# Patient Record
Sex: FEMALE | Race: WHITE | NOT HISPANIC OR LATINO | Employment: OTHER | URBAN - METROPOLITAN AREA
[De-identification: names, ages, dates, MRNs, and addresses within clinical notes are randomized per-mention and may not be internally consistent; named-entity substitution may affect disease eponyms.]

---

## 2017-01-03 ENCOUNTER — TELEPHONE (OUTPATIENT)
Dept: RHEUMATOLOGY | Facility: CLINIC | Age: 62
End: 2017-01-03

## 2017-01-03 ENCOUNTER — TELEPHONE (OUTPATIENT)
Dept: INTERNAL MEDICINE | Facility: CLINIC | Age: 62
End: 2017-01-03

## 2017-01-03 DIAGNOSIS — E03.4 HYPOTHYROIDISM DUE TO ACQUIRED ATROPHY OF THYROID: Primary | ICD-10-CM

## 2017-01-03 NOTE — TELEPHONE ENCOUNTER
----- Message from Rashid Kenny sent at 1/3/2017 12:27 PM CST -----  Contact: pt  She's calling in regards to her RX medication, please advise, [phone] (home)

## 2017-01-03 NOTE — TELEPHONE ENCOUNTER
Spoke with patient who states she has an abscess tooth and was prescribed ABX. I informed her that she should stop her Humira and Arava while on her ABX and while the infection is still present. Resume once ABX is finished and infection is resolved. Ms. Becerril states understanding.

## 2017-01-03 NOTE — TELEPHONE ENCOUNTER
----- Message from Colette Marvin sent at 1/3/2017  9:45 AM CST -----  States she is scheduled for lab work on 1/9. Would like lab orders to be faxed over to [facility]. Please adv/call [phone].//thanks. cw

## 2017-01-09 ENCOUNTER — TELEPHONE (OUTPATIENT)
Dept: INTERNAL MEDICINE | Facility: CLINIC | Age: 62
End: 2017-01-09

## 2017-01-09 NOTE — TELEPHONE ENCOUNTER
----- Message from Chantale Villafuerte sent at 1/9/2017  8:15 AM CST -----  Contact: pt [phone]  States her orders for her labs weren't faxed to [facility] to and states she expects an answer and why was her time was wasted to go and can be reached at [phone]//shon/adilene

## 2017-01-09 NOTE — TELEPHONE ENCOUNTER
S/w pt and informed that orders will be refaxed. Pt verbalized understanding. Orders faxed to [phone].

## 2017-02-10 DIAGNOSIS — E03.4 HYPOTHYROIDISM DUE TO ACQUIRED ATROPHY OF THYROID: Primary | ICD-10-CM

## 2017-02-10 RX ORDER — LEVOTHYROXINE SODIUM 75 UG/1
TABLET ORAL
Qty: 90 TABLET | Refills: 0 | Status: SHIPPED | OUTPATIENT
Start: 2017-02-10 | End: 2017-04-03 | Stop reason: SDUPTHER

## 2017-02-28 ENCOUNTER — OFFICE VISIT (OUTPATIENT)
Dept: RHEUMATOLOGY | Facility: CLINIC | Age: 62
End: 2017-02-28
Payer: COMMERCIAL

## 2017-02-28 VITALS
SYSTOLIC BLOOD PRESSURE: 137 MMHG | HEART RATE: 59 BPM | WEIGHT: 149.5 LBS | BODY MASS INDEX: 23.41 KG/M2 | DIASTOLIC BLOOD PRESSURE: 88 MMHG

## 2017-02-28 DIAGNOSIS — Z51.81 MEDICATION MONITORING ENCOUNTER: ICD-10-CM

## 2017-02-28 DIAGNOSIS — J98.4 RESTRICTIVE LUNG DISEASE: ICD-10-CM

## 2017-02-28 DIAGNOSIS — L40.9 PSORIASIS: Primary | ICD-10-CM

## 2017-02-28 DIAGNOSIS — R91.8 MULTIPLE LUNG NODULES: ICD-10-CM

## 2017-02-28 DIAGNOSIS — D84.9 IMMUNOCOMPROMISED: ICD-10-CM

## 2017-02-28 DIAGNOSIS — L40.50 PSORIATIC ARTHRITIS: ICD-10-CM

## 2017-02-28 PROCEDURE — 1160F RVW MEDS BY RX/DR IN RCRD: CPT | Mod: S$GLB,,, | Performed by: PHYSICIAN ASSISTANT

## 2017-02-28 PROCEDURE — 99999 PR PBB SHADOW E&M-EST. PATIENT-LVL III: CPT | Mod: PBBFAC,,, | Performed by: PHYSICIAN ASSISTANT

## 2017-02-28 PROCEDURE — 99214 OFFICE O/P EST MOD 30 MIN: CPT | Mod: S$GLB,,, | Performed by: PHYSICIAN ASSISTANT

## 2017-02-28 NOTE — PROGRESS NOTES
Subjective:       Patient ID: Maricarmen Becerril is a 61 y.o. female.    Chief Complaint: Psoriatic Arthritis    HPI Comments: Maricarmen is here for routine follow up for psoriatic arthritis with psoriasis.  She in the past has been on mtx but this was discontinued due to development of ILD and lung nodules.  She is currently on Arava 20mg every other day and humira 40mg every other week and doing well.  Her pain is 0/10.  She has no acute swollen joints or digits.  She says she may have a little psoriasis rash on the inside of her ear because it itches but no rash behind her ears or on her buttock which is where she used to have it. She has chronic neck and back pain due to djd of her  Neck and back, she says she has bulging discs in her low back. She sees pulmonary for her lungs and her last CT was stable. She also takes neurontin 100mg nightly for numbness and pain in her legs.  This is helping.     Review of Systems   Constitutional: Negative for chills, fatigue and fever.   HENT: Negative for mouth sores, rhinorrhea and sore throat.    Eyes: Negative for pain and redness.   Respiratory: Negative for cough and shortness of breath.    Cardiovascular: Negative for chest pain.   Gastrointestinal: Negative for abdominal pain, constipation, diarrhea, nausea and vomiting.   Genitourinary: Negative for dysuria and hematuria.   Musculoskeletal: Positive for arthralgias, back pain and neck pain. Negative for joint swelling and myalgias.   Skin: Positive for rash.   Neurological: Positive for numbness. Negative for weakness and headaches.   Psychiatric/Behavioral: The patient is not nervous/anxious.          Objective:   /88  Pulse (!) 59  Wt 67.8 kg (149 lb 7.6 oz)  BMI 23.41 kg/m2     Physical Exam   Constitutional: She is oriented to person, place, and time and well-developed, well-nourished, and in no distress.   HENT:   Head: Normocephalic and atraumatic.   Eyes: Pupils are equal, round, and reactive to  light. Right eye exhibits no discharge.   Neck: Normal range of motion.   Cardiovascular: Normal rate, regular rhythm and normal heart sounds.  Exam reveals no friction rub.    Pulmonary/Chest: Effort normal and breath sounds normal. No respiratory distress.   Abdominal: Soft. She exhibits no distension. There is no tenderness.   Lymphadenopathy:     She has no cervical adenopathy.   Neurological: She is alert and oriented to person, place, and time.   Skin: No rash noted. No erythema.     No psoriasis rash visible, she says the inside of her R ear itches and has a little rash   Psychiatric: Mood normal.   Musculoskeletal: Normal range of motion. She exhibits no edema or deformity.   liya hands with oa changes to her pip and dip joints, no synovitis, no dactylitis noted.      Full rom to liya wrists, mcps, pips    Full rom liya knees         [facility] yet    Ct chest 6-14-16: Previously demonstrated tiny pulmonary nodules are not identified. There are no new nodules. There are one or 2 tiny nodules at the lateral left lung base. No pulmonary infiltrates or pleural effusions. No abnormality of the airway. No evidence of pathologic hilar or mediastinal lymphadenopathy     Assessment:       1. Psoriasis    2. Psoriatic arthritis    3. Medication monitoring encounter    4. Immunocompromised    5. Restrictive lung disease    6. Multiple lung nodules        Psoriatic arthritis with psoriasis:  On humira 40mg every other week and arava 20mg every other day, celebrex 200mg/d; doing well, cdai 0, mhaq 0.2; failed mtx due to development of ILD/lung nodules  Medication monitoring: no signs of toxicity, need to review labwork  Immunocompromised:  utd except zoster, she is declining the vaccine at this time  Lung disease/lung nodules: thought due to mtx, pulm following, this is stable  DJD spine neck back: with radicular symptoms liya legs, stable, celebrex helps as does neurontin 100mg q  hs    Plan:   Send for labs, getting done at [facility]  Continue humira 40mg every other week and arava 20mg every other day  Cont f/u with pulm for ILD  Encouraged zoster vaccine, she will think about it, she knows she needs to hold her dmard/biologic when she gets this, could be candidate for research study, but again she is not wanting this     Return in 4 mos with labwork done prior to appt and in 8 mos with dr. Gu.  Reviewed,  Will se if can encourage her to get Shingles vacc.  DANN GU MD

## 2017-03-20 ENCOUNTER — TELEPHONE (OUTPATIENT)
Dept: RESEARCH | Facility: HOSPITAL | Age: 62
End: 2017-03-20

## 2017-03-20 NOTE — TELEPHONE ENCOUNTER
Contacted patient regarding participation in the VERVE trial. Patient is not interested in receiving the shingles vaccination at this time. Coordinator informed patient that the research department will follow-up with her at her next Rheumatology visit to gauge interest.

## 2017-03-21 ENCOUNTER — PATIENT OUTREACH (OUTPATIENT)
Dept: ADMINISTRATIVE | Facility: HOSPITAL | Age: 62
End: 2017-03-21

## 2017-03-29 LAB
ALBUMIN SERPL-MCNC: 4.5 G/DL (ref 3.6–4.8)
ALBUMIN/GLOB SERPL: 1.5 {RATIO} (ref 1.2–2.2)
ALP SERPL-CCNC: 63 IU/L (ref 39–117)
ALT SERPL-CCNC: 34 IU/L (ref 0–32)
AST SERPL-CCNC: 26 IU/L (ref 0–40)
BASOPHILS # BLD AUTO: 0 X10E3/UL (ref 0–0.2)
BASOPHILS NFR BLD AUTO: 1 %
BILIRUB SERPL-MCNC: 0.5 MG/DL (ref 0–1.2)
BUN SERPL-MCNC: 23 MG/DL (ref 8–27)
BUN/CREAT SERPL: 33 (ref 11–26)
CALCIUM SERPL-MCNC: 10 MG/DL (ref 8.7–10.3)
CHLORIDE SERPL-SCNC: 101 MMOL/L (ref 96–106)
CHOLEST SERPL-MCNC: 248 MG/DL (ref 100–199)
CO2 SERPL-SCNC: 23 MMOL/L (ref 18–29)
CREAT SERPL-MCNC: 0.7 MG/DL (ref 0.57–1)
CRP SERPL-MCNC: 1 MG/L (ref 0–4.9)
EOSINOPHIL # BLD AUTO: 0.4 X10E3/UL (ref 0–0.4)
EOSINOPHIL NFR BLD AUTO: 7 %
ERYTHROCYTE [DISTWIDTH] IN BLOOD BY AUTOMATED COUNT: 14.5 % (ref 12.3–15.4)
ERYTHROCYTE [SEDIMENTATION RATE] IN BLOOD BY WESTERGREN METHOD: 5 MM/HR (ref 0–40)
GLOBULIN SER CALC-MCNC: 3 G/DL (ref 1.5–4.5)
GLUCOSE SERPL-MCNC: 88 MG/DL (ref 65–99)
HCT VFR BLD AUTO: 41.6 % (ref 34–46.6)
HDLC SERPL-MCNC: 78 MG/DL
HGB BLD-MCNC: 13.5 G/DL (ref 11.1–15.9)
IMM GRANULOCYTES # BLD: 0 X10E3/UL (ref 0–0.1)
IMM GRANULOCYTES NFR BLD: 0 %
LDLC SERPL CALC-MCNC: 159 MG/DL (ref 0–99)
LYMPHOCYTES # BLD AUTO: 1.6 X10E3/UL (ref 0.7–3.1)
LYMPHOCYTES NFR BLD AUTO: 33 %
MCH RBC QN AUTO: 29.7 PG (ref 26.6–33)
MCHC RBC AUTO-ENTMCNC: 32.5 G/DL (ref 31.5–35.7)
MCV RBC AUTO: 92 FL (ref 79–97)
MONOCYTES # BLD AUTO: 0.5 X10E3/UL (ref 0.1–0.9)
MONOCYTES NFR BLD AUTO: 11 %
NEUTROPHILS # BLD AUTO: 2.4 X10E3/UL (ref 1.4–7)
NEUTROPHILS NFR BLD AUTO: 48 %
PLATELET # BLD AUTO: 274 X10E3/UL (ref 150–379)
POTASSIUM SERPL-SCNC: 4.5 MMOL/L (ref 3.5–5.2)
PROT SERPL-MCNC: 7.5 G/DL (ref 6–8.5)
RBC # BLD AUTO: 4.54 X10E6/UL (ref 3.77–5.28)
SODIUM SERPL-SCNC: 143 MMOL/L (ref 134–144)
TRIGL SERPL-MCNC: 57 MG/DL (ref 0–149)
TSH SERPL DL<=0.005 MIU/L-ACNC: 1.81 UIU/ML (ref 0.45–4.5)
VLDLC SERPL CALC-MCNC: 11 MG/DL (ref 5–40)
WBC # BLD AUTO: 5 X10E3/UL (ref 3.4–10.8)

## 2017-04-03 ENCOUNTER — OFFICE VISIT (OUTPATIENT)
Dept: INTERNAL MEDICINE | Facility: CLINIC | Age: 62
End: 2017-04-03
Payer: COMMERCIAL

## 2017-04-03 VITALS
OXYGEN SATURATION: 100 % | HEART RATE: 75 BPM | DIASTOLIC BLOOD PRESSURE: 80 MMHG | HEIGHT: 66 IN | SYSTOLIC BLOOD PRESSURE: 118 MMHG | WEIGHT: 147.69 LBS | TEMPERATURE: 96 F | BODY MASS INDEX: 23.74 KG/M2

## 2017-04-03 DIAGNOSIS — Z12.11 SCREEN FOR COLON CANCER: ICD-10-CM

## 2017-04-03 DIAGNOSIS — Z00.00 ROUTINE HEALTH MAINTENANCE: Primary | ICD-10-CM

## 2017-04-03 PROCEDURE — 99999 PR PBB SHADOW E&M-EST. PATIENT-LVL III: CPT | Mod: PBBFAC,,, | Performed by: FAMILY MEDICINE

## 2017-04-03 PROCEDURE — 99396 PREV VISIT EST AGE 40-64: CPT | Mod: S$GLB,,, | Performed by: FAMILY MEDICINE

## 2017-04-03 RX ORDER — LEVOTHYROXINE SODIUM 75 UG/1
75 TABLET ORAL EVERY MORNING
Qty: 90 TABLET | Refills: 3 | Status: SHIPPED | OUTPATIENT
Start: 2017-04-03 | End: 2018-04-16 | Stop reason: SDUPTHER

## 2017-04-03 RX ORDER — SODIUM, POTASSIUM,MAG SULFATES 17.5-3.13G
1 SOLUTION, RECONSTITUTED, ORAL ORAL DAILY PRN
Qty: 1 BOTTLE | Refills: 0 | Status: SHIPPED | OUTPATIENT
Start: 2017-04-03 | End: 2021-03-02

## 2017-04-03 NOTE — PROGRESS NOTES
Subjective:       Patient ID: Maricarmen Becerril is a . female.    Chief Complaint: here for physical examination and issues  below    HPI Hypothyroidism: nl  [facility] tsh. Tolerating med. asympt  intersit lungdx/nodules pulm  due 7/17  psor arth utd rheum    Past Medical History   Diagnosis Date    Psoriatic arthritis     Positive SANA (antinuclear antibody)     Allergic rhinitis     Hypothyroidism      SECONDARY    Lung disease      mtx related    Allergy     Arthritis     Acid reflux     Heart murmur     Dry mouth      Past Surgical History   Procedure Laterality Date    Thyroidectomy      Tonsillectomy      Mandible fracture surgery      Other surgical history       uterine suspension    Dilation and curettage of uterus       Family History   Problem Relation Age of Onset    Parkinsonism Mother     Cataracts Mother     Gout Father     Hypothyroidism Father     Cataracts Father     Hypertension Father      History     Social History    Marital Status:      Spouse Name: BOLIVAR     Number of Children: N/A    Years of Education: N/A     Occupational History          Social History Main Topics    Smoking status: Never Smoker     Smokeless tobacco: Never Used    Alcohol Use: No    Drug Use: No    Sexual Activity: Not on file     Other Topics Concern    Not on file     Social History Narrative       Review of Systems  Cardiovascular: no chest pain  Chest: no shortness of breath  Abd: no abd pain  Remainder review of systems negative    Objective:      Physical Exam   Constitutional: She is oriented to person, place, and time. She appears well-developed and well-nourished. No distress.   HENT:   Head: Atraumatic.   Right Ear: External ear normal.   Left Ear: External ear normal.   Nose: Nose normal.   Mouth/Throat: Oropharynx is clear and moist. No oropharyngeal exudate.   bilat tms nl   Eyes: Conjunctivae and EOM are normal. Pupils are equal, round, and reactive to light. No  scleral icterus.   Neck: Normal range of motion. Neck supple. No thyromegaly present.   Cardiovascular: Normal rate, regular rhythm and normal heart sounds.    No murmur heard.  Pulmonary/Chest: Effort normal and breath sounds normal. No respiratory distress. She has no wheezes. She has no rales.   Abdominal: Soft. Bowel sounds are normal. She exhibits no distension and no mass. There is no hepatosplenomegaly. There is no tenderness. There is no rebound and no guarding.   Musculoskeletal: Normal range of motion. She exhibits no edema or tenderness.   Lymphadenopathy:     She has no cervical adenopathy.   Neurological: She is alert and oriented to person, place, and time. No cranial nerve deficit. She exhibits normal muscle tone. Coordination normal.   Skin: Skin is warm. No rash noted. No erythema. No pallor.   Psychiatric: She has a normal mood and affect. Her behavior is normal. Judgment and thought content normal.   Nursing note and vitals reviewed.      Assessment:       1. Routine health maintenance    2. Hypothyroidism, unspecified hypothyroidism type        Lung nodules  Psori. arthritis  Plan:     F/u one yr: copy lab to her  *    F/u pulm as planned ct etc this summer  F/u rheum when due  .**  Add:note prev d/wd 2011 mri show liver lesions most likely hemangiomas w recomm fede; d/w this and reason for but she declines  Routine health maintenance    Screen for colon cancer  -     Case request GI: COLONOSCOPY  -     sodium,potassium,mag sulfates (SUPREP BOWEL PREP KIT) 17.5-3.13-1.6 gram SolR; Take 1 kit by mouth daily as needed.  Dispense: 1 Bottle; Refill: 0    Other orders  -     levothyroxine (SYNTHROID) 75 MCG tablet; Take 1 tablet (75 mcg total) by mouth every morning.  Dispense: 90 tablet; Refill: 3

## 2017-04-03 NOTE — MR AVS SNAPSHOT
[facility] Internal Medicine  [address] [address]  Phone: [phone]  Fax: [phone]                  Maricarmen Becerril   4/3/2017 8:20 AM   Office Visit    Description:  Female : 1955   Provider:  Ryan Viramontes MD   Department:  [facility] - Internal Medicine           Diagnoses this Visit        Comments    Routine health maintenance    -  Primary     Screen for colon cancer                To Do List           Future Appointments        Provider Department Dept Phone    2017 8:00 AM Lisa Knapp PA-C [facility] Rheumatology [phone]    2017 10:40 AM PULMONARY LAB, [facility] - Pulm Function S[facility]cs [phone]    2017 11:20 AM Edgar Sinclair MD [facility] - Pulmonary Services [phone]    10/25/2017 8:30 AM Edgar Gu MD [facility] Rheumatology [phone]    2018 8:20 AM Ryan Viramontes MD [facility] Internal Medicine [phone]      Goals (5 Years of Data)     None      Follow-Up and Disposition     Return in about 1 year (around 4/3/2018).       These Medications        Disp Refills Start End    levothyroxine (SYNTHROID) 75 MCG tablet 90 tablet 3 4/3/2017     Take 1 tablet (75 mcg total) by mouth every morning. - Oral    Pharmacy: [facility] [address] [place] [address] -  [address] AT [facility] Ph #: [phone]       sodium,potassium,mag sulfates (SUPREP BOWEL PREP KIT) 17.5-3.13-1.6 gram SolR 1 Bottle 0 4/3/2017     Take 1 kit by mouth daily as needed. - Oral    Pharmacy: [facility] [address] [place] [address] Ph #: [phone]         [facility]     [facility] Nurse Care Line - 24/7 Assistance  Unless otherwise directed by your provider, please contact [facility], our nurse care line that is available for 24/7 assistance.     Registered nurses in the [facility] provide: appointment scheduling, clinical advisement, health education, and other  advisory services.  Call: [phone] (toll free)               Medications           Message regarding Medications     Verify the changes and/or additions to your medication regime listed below are the same as discussed with your clinician today.  If any of these changes or additions are incorrect, please notify your healthcare provider.        START taking these NEW medications        Refills    sodium,potassium,mag sulfates (SUPREP BOWEL PREP KIT) 17.5-3.13-1.6 gram SolR 0    Sig: Take 1 kit by mouth daily as needed.    Class: Normal    Route: Oral      CHANGE how you are taking these medications     Start Taking Instead of    levothyroxine (SYNTHROID) 75 MCG tablet levothyroxine (SYNTHROID) 75 MCG tablet    Dosage:  Take 1 tablet (75 mcg total) by mouth every morning. Dosage:  TAKE 1 TABLET BY MOUTH EVERY MORNING    Reason for Change:  Reorder       STOP taking these medications     fluticasone (FLONASE) 50 mcg/actuation nasal spray 2 sprays by Each Nare route once daily.           Verify that the below list of medications is an accurate representation of the medications you are currently taking.  If none reported, the list may be blank. If incorrect, please contact your healthcare provider. Carry this list with you in case of emergency.           Current Medications     adalimumab (HUMIRA PEN) PnKt injection INJECT 40MG SUBCUTANEOUSLY EVERY OTHER WEEK    celecoxib (CELEBREX) 200 MG capsule Take 1 capsule (200 mg total) by mouth once daily.    fish oil-omega-3 fatty acids 300-1,000 mg capsule Take 1 g by mouth once daily.    gabapentin (NEURONTIN) 100 MG capsule Take 1 capsule (100 mg total) by mouth every evening. For nerve pain /numbness in legs    leflunomide (ARAVA) 20 MG Tab Take 1 tablet (20 mg total) by mouth every other day.    levothyroxine (SYNTHROID) 75 MCG tablet Take 1 tablet (75 mcg total) by mouth every morning.    tizanidine (ZANAFLEX) 4 MG tablet Take 1 tablet (4 mg total) by mouth every  "evening. For neck spasm    sodium,potassium,mag sulfates (SUPREP BOWEL PREP KIT) 17.5-3.13-1.6 gram SolR Take 1 kit by mouth daily as needed.           Clinical Reference Information           Your Vitals Were     BP Pulse Temp Height    118/80 (BP Location: Right arm, Patient Position: Sitting, BP Method: Manual) 75 96.2 °F (35.7 °C) (Tympanic) 5' 6" (1.676 m)    Weight SpO2 BMI    67 kg (147 lb 11.3 oz) 100% 23.84 kg/m2      Blood Pressure          Most Recent Value    BP  118/80      Allergies as of 4/3/2017     Codeine    Methotrexate    Tramadol      Immunizations Administered on Date of Encounter - 4/3/2017     None      Orders Placed During Today's Visit      Normal Orders This Visit    Case request GI: COLONOSCOPY       Language Assistance Services     ATTENTION: Language assistance services are available, free of charge. Please call [phone].      ATENCIÓN: Si habla español, tiene a zendejas disposición servicios gratuitos de asistencia lingüística. Llame al [phone].     VITOR Ý: N?u b?n nói Ti?ng Vi?t, có các d?ch v? h? tr? ngôn ng? mi?n phí dành cho b?n. G?i s? [phone].         [facility] - Internal Medicine complies with applicable Federal civil rights laws and does not discriminate on the basis of race, color, national origin, age, disability, or sex.        "

## 2017-04-23 DIAGNOSIS — M47.812 SPONDYLOSIS OF CERVICAL REGION WITHOUT MYELOPATHY OR RADICULOPATHY: ICD-10-CM

## 2017-04-24 RX ORDER — TIZANIDINE 4 MG/1
TABLET ORAL
Qty: 30 TABLET | Refills: 2 | Status: SHIPPED | OUTPATIENT
Start: 2017-04-24 | End: 2017-07-30 | Stop reason: SDUPTHER

## 2017-04-30 DIAGNOSIS — L40.50 PSORIATIC ARTHRITIS: ICD-10-CM

## 2017-05-01 RX ORDER — CELECOXIB 200 MG/1
CAPSULE ORAL
Qty: 90 CAPSULE | Refills: 0 | Status: SHIPPED | OUTPATIENT
Start: 2017-05-01 | End: 2017-08-14 | Stop reason: SDUPTHER

## 2017-05-17 DIAGNOSIS — L40.50 PSORIATIC ARTHRITIS: ICD-10-CM

## 2017-05-18 RX ORDER — LEFLUNOMIDE 20 MG/1
TABLET ORAL
Qty: 15 TABLET | Refills: 3 | Status: SHIPPED | OUTPATIENT
Start: 2017-05-18 | End: 2017-09-26 | Stop reason: SDUPTHER

## 2017-06-27 ENCOUNTER — OFFICE VISIT (OUTPATIENT)
Dept: RHEUMATOLOGY | Facility: CLINIC | Age: 62
End: 2017-06-27
Payer: COMMERCIAL

## 2017-06-27 VITALS
SYSTOLIC BLOOD PRESSURE: 130 MMHG | BODY MASS INDEX: 24.09 KG/M2 | WEIGHT: 149.25 LBS | HEART RATE: 63 BPM | DIASTOLIC BLOOD PRESSURE: 89 MMHG

## 2017-06-27 DIAGNOSIS — J98.4 RESTRICTIVE LUNG DISEASE: ICD-10-CM

## 2017-06-27 DIAGNOSIS — D84.9 IMMUNOCOMPROMISED: ICD-10-CM

## 2017-06-27 DIAGNOSIS — L40.50 PSORIATIC ARTHRITIS: Primary | ICD-10-CM

## 2017-06-27 DIAGNOSIS — L40.9 PSORIASIS: ICD-10-CM

## 2017-06-27 DIAGNOSIS — Z51.81 MEDICATION MONITORING ENCOUNTER: ICD-10-CM

## 2017-06-27 PROCEDURE — 99999 PR PBB SHADOW E&M-EST. PATIENT-LVL III: CPT | Mod: PBBFAC,,, | Performed by: PHYSICIAN ASSISTANT

## 2017-06-27 PROCEDURE — 99214 OFFICE O/P EST MOD 30 MIN: CPT | Mod: S$GLB,,, | Performed by: PHYSICIAN ASSISTANT

## 2017-06-27 ASSESSMENT — ROUTINE ASSESSMENT OF PATIENT INDEX DATA (RAPID3): MDHAQ FUNCTION SCORE: .2

## 2017-06-27 NOTE — PATIENT INSTRUCTIONS
Increase gabapentin to 200mg at night then 300mg at night, can add doses during the day if it doesn't make you tired  Vit B6 for Neuropathy   Tumeric 1000mg/day for antiinflammation    No change meds for psoriatic arthritis/psoriasis     Avoid nsaids aleve, ibuprofen, motrin  Ok for tylenol, otc sinus medications, allergy medications      labwork this week, and then before you see Dr. Gu

## 2017-06-27 NOTE — PROGRESS NOTES
Subjective:       Patient ID: Maricarmen Becerril is a 62 y.o. female.    Chief Complaint: PsA, psoriasis    Maricarmen is here for routine follow up for psoriatic arthritis with psoriasis.  She in the past has been on mtx but this was discontinued due to development of ILD and lung nodules.  She is currently on Arava 20mg every other day and humira 40mg every other week and doing well.  Her pain is 0/10.  She has no acute swollen joints or digits.  No psoriasis rash today.  She has chronic neck and back pain due to degenerative arthritis, bulging discs (mri l spine in 2013 with disc disease, deg arthritis),  She sees pulmonary for her lungs and her last CT was stable. She also takes neurontin 100mg nightly for numbness and pain in her legs.  She still struggles with this at night, the pain and numbness is not well controlled.  Her complaints today are mainly her neck with pain and stiffness and the numbness in her legs/feet.        Review of Systems   Constitutional: Negative for chills, fatigue and fever.   HENT: Negative for mouth sores, rhinorrhea and sore throat.    Eyes: Negative for pain and redness.   Respiratory: Negative for cough and shortness of breath.    Cardiovascular: Negative for chest pain.   Gastrointestinal: Negative for abdominal pain, constipation, diarrhea, nausea and vomiting.   Genitourinary: Negative for dysuria and hematuria.   Musculoskeletal: Positive for arthralgias, back pain and neck pain. Negative for joint swelling and myalgias.   Skin: Positive for rash.   Neurological: Positive for numbness. Negative for weakness and headaches.   Psychiatric/Behavioral: The patient is not nervous/anxious.          Objective:   /89   Pulse 63   Wt 67.7 kg (149 lb 4 oz)   BMI 24.09 kg/m²      Physical Exam   Constitutional: She is oriented to person, place, and time and well-developed, well-nourished, and in no distress.   HENT:   Head: Normocephalic and atraumatic.   Eyes: Pupils are  equal, round, and reactive to light. Right eye exhibits no discharge.   Neck: Normal range of motion.   Cardiovascular: Normal rate, regular rhythm and normal heart sounds.  Exam reveals no friction rub.    Pulmonary/Chest: Effort normal and breath sounds normal. No respiratory distress.   Abdominal: Soft. She exhibits no distension. There is no tenderness.   Lymphadenopathy:     She has no cervical adenopathy.   Neurological: She is alert and oriented to person, place, and time.   Skin: No rash noted. No erythema.     No psoriasis rash visible   Psychiatric: Mood normal.   Musculoskeletal: Normal range of motion. She exhibits no edema or deformity.   liya hands with oa changes to her pip and dip joints, no synovitis, no dactylitis noted.      Full rom to liya wrists, mcps, pips    Full rom liya knees         [facility]    Ct chest 6-14-16: Previously demonstrated tiny pulmonary nodules are not identified. There are no new nodules. There are one or 2 tiny nodules at the lateral left lung base. No pulmonary infiltrates or pleural effusions. No abnormality of the airway. No evidence of pathologic hilar or mediastinal lymphadenopathy     Assessment:       1. Psoriatic arthritis    2. Psoriasis    3. Medication monitoring encounter    4. Immunocompromised    5. Restrictive lung disease        Impression:    Psoriatic arthritis with psoriasis:  Well controlled on humira 40mg every other week and arava 20mg every other day, celebrex 200mg/d; doing well, cdai 0, mhaq 0.2; failed mtx due to development of ILD/lung nodules  Medication monitoring: no signs of toxicity, need to review labwork  Immunocompromised:  utd except zoster, she is declining the vaccine at this time  Lung disease/lung nodules: thought due to mtx, pulm following, this is stable  DJD spine,  neck back: with radicular symptoms liya legs, stable, celebrex helps as does neurontin 100mg q hs but still having issues with  numbness, pain at night in feet; has done PT in the past    Plan:   Continue humira 40mg every other week and arava 20mg every other day  Go get labwork done at [facility] this week and fax results to us  Cont f/u with pulm for ILD  Encouraged zoster vaccine again, she really doesn't want to do this, she knows she needs to hold her dmard/biologic when she gets this, could be candidate for research study?, but again she is not wanting this   Increase gabapentin to 200mg q hs then 300mg, can also increase to 100mg in the morning and/or in afternoon  rec tumeric 1000mg/d    Return in 4 mos with labwork done prior to appt at [facility]

## 2017-07-05 ENCOUNTER — TELEPHONE (OUTPATIENT)
Dept: INTERNAL MEDICINE | Facility: CLINIC | Age: 62
End: 2017-07-05

## 2017-07-05 ENCOUNTER — OFFICE VISIT (OUTPATIENT)
Dept: INTERNAL MEDICINE | Facility: CLINIC | Age: 62
End: 2017-07-05
Payer: COMMERCIAL

## 2017-07-05 VITALS
BODY MASS INDEX: 23.39 KG/M2 | TEMPERATURE: 95 F | WEIGHT: 149 LBS | HEART RATE: 64 BPM | DIASTOLIC BLOOD PRESSURE: 82 MMHG | HEIGHT: 67 IN | SYSTOLIC BLOOD PRESSURE: 118 MMHG

## 2017-07-05 DIAGNOSIS — L40.50 PSORIATIC ARTHRITIS: ICD-10-CM

## 2017-07-05 DIAGNOSIS — J32.0 MAXILLARY SINUSITIS, UNSPECIFIED CHRONICITY: Primary | ICD-10-CM

## 2017-07-05 PROCEDURE — 99214 OFFICE O/P EST MOD 30 MIN: CPT | Mod: S$GLB,,, | Performed by: FAMILY MEDICINE

## 2017-07-05 PROCEDURE — 99999 PR PBB SHADOW E&M-EST. PATIENT-LVL III: CPT | Mod: PBBFAC,,, | Performed by: FAMILY MEDICINE

## 2017-07-05 RX ORDER — AMOXICILLIN AND CLAVULANATE POTASSIUM 500; 125 MG/1; MG/1
1 TABLET, FILM COATED ORAL 2 TIMES DAILY
Qty: 20 TABLET | Refills: 0 | Status: SHIPPED | OUTPATIENT
Start: 2017-07-05 | End: 2017-10-25 | Stop reason: ALTCHOICE

## 2017-07-05 RX ORDER — METHYLPREDNISOLONE 4 MG/1
TABLET ORAL
Qty: 1 PACKAGE | Refills: 0 | Status: SHIPPED | OUTPATIENT
Start: 2017-07-05 | End: 2017-07-26

## 2017-07-05 NOTE — PROGRESS NOTES
Subjective:       Patient ID: Maricarmen Becerril is a 62 y.o. female.    Chief Complaint: Sinus Problem and Cough (productive/dry sometimes )    Sinus Problem   This is a new problem. The current episode started 1 to 4 weeks ago. The problem is unchanged. There has been no fever. Her pain is at a severity of 6/10. The pain is moderate. Associated symptoms include congestion, coughing and sinus pressure. Pertinent negatives include no hoarse voice, sneezing or sore throat. Past treatments include nothing. The treatment provided no relief.   Cough   This is a new problem. The current episode started 1 to 4 weeks ago. The problem has been waxing and waning. The problem occurs constantly. The cough is productive of sputum. Associated symptoms include wheezing. Pertinent negatives include no sore throat. Nothing aggravates the symptoms. She has tried nothing for the symptoms. The treatment provided no relief. restricted lung     Review of Systems   HENT: Positive for congestion and sinus pressure. Negative for hoarse voice, sneezing, sore throat and trouble swallowing.    Respiratory: Positive for cough and wheezing.    Gastrointestinal: Negative.    Genitourinary: Negative.    Musculoskeletal: Negative.    Hematological: Negative.    Psychiatric/Behavioral: Negative.        Objective:      Physical Exam   Constitutional: She appears well-developed and well-nourished.   HENT:   Right Ear: Tympanic membrane is erythematous. A middle ear effusion is present.   Left Ear: Tympanic membrane is erythematous. A middle ear effusion is present.   Nose: Mucosal edema and rhinorrhea present. Right sinus exhibits maxillary sinus tenderness. Left sinus exhibits maxillary sinus tenderness.   Mouth/Throat: Posterior oropharyngeal erythema present.   Cardiovascular: Normal rate and regular rhythm.    Pulmonary/Chest: Effort normal and breath sounds normal.       Assessment:       1. Maxillary sinusitis, unspecified chronicity     2. Psoriatic arthritis        Plan:       Maxillary sinusitis, unspecified chronicity  Comments:  Will start with Augmentin and medrol dose pack    Psoriatic arthritis  Comments:  Pt is on Humira and will discuss resume Humira after antibiotics    Other orders  -     amoxicillin-clavulanate 500-125mg (AUGMENTIN) 500-125 mg Tab; Take 1 tablet (500 mg total) by mouth 2 (two) times daily.  Dispense: 20 tablet; Refill: 0  -     methylPREDNISolone (MEDROL DOSEPACK) 4 mg tablet; use as directed  Dispense: 1 Package; Refill: 0

## 2017-07-05 NOTE — TELEPHONE ENCOUNTER
----- Message from Kassie Cruz sent at 7/5/2017  9:13 AM CDT -----  Contact: pt  States she's stuck in traffic, she will be 15 to 20 minutes late. Please call pt at [phone]. Thank you

## 2017-07-09 ENCOUNTER — PATIENT MESSAGE (OUTPATIENT)
Dept: RHEUMATOLOGY | Facility: CLINIC | Age: 62
End: 2017-07-09

## 2017-07-10 ENCOUNTER — TELEPHONE (OUTPATIENT)
Dept: RHEUMATOLOGY | Facility: CLINIC | Age: 62
End: 2017-07-10

## 2017-07-30 DIAGNOSIS — M47.812 SPONDYLOSIS OF CERVICAL REGION WITHOUT MYELOPATHY OR RADICULOPATHY: ICD-10-CM

## 2017-08-02 RX ORDER — TIZANIDINE 4 MG/1
TABLET ORAL
Qty: 30 TABLET | Refills: 3 | Status: SHIPPED | OUTPATIENT
Start: 2017-08-02 | End: 2017-10-25 | Stop reason: SDUPTHER

## 2017-08-07 DIAGNOSIS — M47.812 SPONDYLOSIS OF CERVICAL REGION WITHOUT MYELOPATHY OR RADICULOPATHY: ICD-10-CM

## 2017-08-07 NOTE — TELEPHONE ENCOUNTER
----- Message from Joanna Gamble sent at 8/7/2017 11:23 AM CDT -----  Contact: pt  Please call pt @ [phone] regarding gabapentin medication, states [facility] need prior authorization on pt script, states she take more than usual, pt is running out of medication.

## 2017-08-08 RX ORDER — GABAPENTIN 100 MG/1
100 CAPSULE ORAL NIGHTLY
Qty: 90 CAPSULE | Refills: 3 | Status: SHIPPED | OUTPATIENT
Start: 2017-08-08 | End: 2017-08-14 | Stop reason: SDUPTHER

## 2017-08-14 ENCOUNTER — TELEPHONE (OUTPATIENT)
Dept: RHEUMATOLOGY | Facility: CLINIC | Age: 62
End: 2017-08-14

## 2017-08-14 DIAGNOSIS — M47.812 SPONDYLOSIS OF CERVICAL REGION WITHOUT MYELOPATHY OR RADICULOPATHY: ICD-10-CM

## 2017-08-14 DIAGNOSIS — L40.50 PSORIATIC ARTHRITIS: ICD-10-CM

## 2017-08-14 RX ORDER — GABAPENTIN 300 MG/1
300 CAPSULE ORAL NIGHTLY
Qty: 90 CAPSULE | Refills: 1 | Status: SHIPPED | OUTPATIENT
Start: 2017-08-14 | End: 2017-10-25

## 2017-08-14 NOTE — TELEPHONE ENCOUNTER
----- Message from Colette Reyes sent at 8/14/2017  9:18 AM CDT -----  Would like to speak to nurse. Please call back at [phone]. thanks

## 2017-08-14 NOTE — TELEPHONE ENCOUNTER
Spoke with Ms. Becerril who states that her gabapentin instructions was changed by Lisa Knapp PA-C but the old prescription was [facility]. She is currently out of her Gabapentin and wants it filled today correctly because she does not have time to keep calling for the correct prescription. Please advise.

## 2017-08-15 RX ORDER — CELECOXIB 200 MG/1
CAPSULE ORAL
Qty: 90 CAPSULE | Refills: 1 | Status: SHIPPED | OUTPATIENT
Start: 2017-08-15 | End: 2018-03-15 | Stop reason: SDUPTHER

## 2017-08-16 ENCOUNTER — TELEPHONE (OUTPATIENT)
Dept: RHEUMATOLOGY | Facility: CLINIC | Age: 62
End: 2017-08-16

## 2017-08-16 NOTE — TELEPHONE ENCOUNTER
----- Message from Anne Albright sent at 8/16/2017  3:10 PM CDT -----  Contact: Pt   Pt called in upset states insurance company wants to change her medication to Accerdo,  Pt requested that the insurance company be called to find out why  Pt has [employer]  [phone] pt is requesting to be called back at [phone]

## 2017-08-16 NOTE — TELEPHONE ENCOUNTER
----- Message from Hemanth Peña sent at 8/16/2017  4:17 PM CDT -----  The pt sent a previous request to have the staff contact her insurer concerning her medication.  The pt has called stating that she no longer needs to have the staff contact the insurer the issue has already been resolved.

## 2017-08-21 ENCOUNTER — TELEPHONE (OUTPATIENT)
Dept: RHEUMATOLOGY | Facility: CLINIC | Age: 62
End: 2017-08-21

## 2017-08-21 NOTE — TELEPHONE ENCOUNTER
Called pharmacy at [facility] a nd gave them pt phone number and insurance info Pharmacist vebalized understanding,

## 2017-08-21 NOTE — TELEPHONE ENCOUNTER
----- Message from Laly Vásquez sent at 8/21/2017  3:28 PM CDT -----  [facility]// At [phone]/you all sent rx to us we have no information on the pt/this is a new pt to us please call right away//thks ht

## 2017-09-11 DIAGNOSIS — Z12.31 OTHER SCREENING MAMMOGRAM: ICD-10-CM

## 2017-09-26 DIAGNOSIS — L40.50 PSORIATIC ARTHRITIS: ICD-10-CM

## 2017-09-26 RX ORDER — LEFLUNOMIDE 20 MG/1
TABLET ORAL
Qty: 16 TABLET | Refills: 3 | Status: SHIPPED | OUTPATIENT
Start: 2017-09-26 | End: 2017-10-25 | Stop reason: SDUPTHER

## 2017-10-18 ENCOUNTER — TELEPHONE (OUTPATIENT)
Dept: RHEUMATOLOGY | Facility: CLINIC | Age: 62
End: 2017-10-18

## 2017-10-18 NOTE — TELEPHONE ENCOUNTER
[facility] at [phone]. Ms. Becerril notified.

## 2017-10-18 NOTE — TELEPHONE ENCOUNTER
----- Message from Joanna Gamble sent at 10/18/2017 10:26 AM CDT -----  Contact: pt  Please call pt @ [phone] regarding an order for lab, to be sent to [facility]

## 2017-10-20 LAB
ALBUMIN SERPL-MCNC: 4.1 G/DL (ref 3.6–4.8)
ALBUMIN/GLOB SERPL: 1.3 {RATIO} (ref 1.2–2.2)
ALP SERPL-CCNC: 52 IU/L (ref 39–117)
ALT SERPL-CCNC: 29 IU/L (ref 0–32)
AMBIG ABBREV CMP 14 DEFAULT: NORMAL
AST SERPL-CCNC: 27 IU/L (ref 0–40)
BASOPHILS # BLD AUTO: 0 X10E3/UL (ref 0–0.2)
BASOPHILS NFR BLD AUTO: 1 %
BILIRUB SERPL-MCNC: 0.6 MG/DL (ref 0–1.2)
BUN SERPL-MCNC: 17 MG/DL (ref 8–27)
BUN/CREAT SERPL: 25 (ref 12–28)
CALCIUM SERPL-MCNC: 10.3 MG/DL (ref 8.7–10.3)
CHLORIDE SERPL-SCNC: 99 MMOL/L (ref 96–106)
CO2 SERPL-SCNC: 27 MMOL/L (ref 18–29)
CREAT SERPL-MCNC: 0.69 MG/DL (ref 0.57–1)
CRP SERPL-MCNC: 0.2 MG/L (ref 0–4.9)
EOSINOPHIL # BLD AUTO: 0.4 X10E3/UL (ref 0–0.4)
EOSINOPHIL NFR BLD AUTO: 9 %
ERYTHROCYTE [DISTWIDTH] IN BLOOD BY AUTOMATED COUNT: 13.9 % (ref 12.3–15.4)
ERYTHROCYTE [SEDIMENTATION RATE] IN BLOOD BY WESTERGREN METHOD: 2 MM/HR (ref 0–40)
GFR SERPLBLD CREATININE-BSD FMLA CKD-EPI: 108 ML/MIN/1.73
GFR SERPLBLD CREATININE-BSD FMLA CKD-EPI: 94 ML/MIN/1.73
GLOBULIN SER CALC-MCNC: 3.1 G/DL (ref 1.5–4.5)
GLUCOSE SERPL-MCNC: 89 MG/DL (ref 65–99)
HCT VFR BLD AUTO: 41.9 % (ref 34–46.6)
HGB BLD-MCNC: 13.6 G/DL (ref 11.1–15.9)
IMM GRANULOCYTES # BLD: 0 X10E3/UL (ref 0–0.1)
IMM GRANULOCYTES NFR BLD: 0 %
LYMPHOCYTES # BLD AUTO: 2 X10E3/UL (ref 0.7–3.1)
LYMPHOCYTES NFR BLD AUTO: 44 %
MCH RBC QN AUTO: 29.6 PG (ref 26.6–33)
MCHC RBC AUTO-ENTMCNC: 32.5 G/DL (ref 31.5–35.7)
MCV RBC AUTO: 91 FL (ref 79–97)
MONOCYTES # BLD AUTO: 0.6 X10E3/UL (ref 0.1–0.9)
MONOCYTES NFR BLD AUTO: 13 %
NEUTROPHILS # BLD AUTO: 1.5 X10E3/UL (ref 1.4–7)
NEUTROPHILS NFR BLD AUTO: 33 %
PLATELET # BLD AUTO: 228 X10E3/UL (ref 150–379)
POTASSIUM SERPL-SCNC: 4.7 MMOL/L (ref 3.5–5.2)
PROT SERPL-MCNC: 7.2 G/DL (ref 6–8.5)
RBC # BLD AUTO: 4.59 X10E6/UL (ref 3.77–5.28)
SODIUM SERPL-SCNC: 141 MMOL/L (ref 134–144)
WBC # BLD AUTO: 4.5 X10E3/UL (ref 3.4–10.8)

## 2017-10-25 ENCOUNTER — OFFICE VISIT (OUTPATIENT)
Dept: RHEUMATOLOGY | Facility: CLINIC | Age: 62
End: 2017-10-25
Payer: COMMERCIAL

## 2017-10-25 VITALS
HEART RATE: 69 BPM | HEIGHT: 67 IN | BODY MASS INDEX: 24.47 KG/M2 | DIASTOLIC BLOOD PRESSURE: 80 MMHG | SYSTOLIC BLOOD PRESSURE: 116 MMHG | WEIGHT: 155.88 LBS

## 2017-10-25 DIAGNOSIS — J98.4 RESTRICTIVE LUNG DISEASE: ICD-10-CM

## 2017-10-25 DIAGNOSIS — M47.812 SPONDYLOSIS OF CERVICAL REGION WITHOUT MYELOPATHY OR RADICULOPATHY: ICD-10-CM

## 2017-10-25 DIAGNOSIS — D84.9 IMMUNOCOMPROMISED: ICD-10-CM

## 2017-10-25 DIAGNOSIS — Z51.81 MEDICATION MONITORING ENCOUNTER: ICD-10-CM

## 2017-10-25 DIAGNOSIS — L40.9 PSORIASIS: ICD-10-CM

## 2017-10-25 DIAGNOSIS — L40.50 PSORIATIC ARTHRITIS: Primary | ICD-10-CM

## 2017-10-25 PROCEDURE — 90471 IMMUNIZATION ADMIN: CPT | Mod: S$GLB,,, | Performed by: INTERNAL MEDICINE

## 2017-10-25 PROCEDURE — 99214 OFFICE O/P EST MOD 30 MIN: CPT | Mod: 25,S$GLB,, | Performed by: INTERNAL MEDICINE

## 2017-10-25 PROCEDURE — 99999 PR PBB SHADOW E&M-EST. PATIENT-LVL III: CPT | Mod: PBBFAC,,, | Performed by: INTERNAL MEDICINE

## 2017-10-25 PROCEDURE — 90662 IIV NO PRSV INCREASED AG IM: CPT | Mod: S$GLB,,, | Performed by: INTERNAL MEDICINE

## 2017-10-25 RX ORDER — TIZANIDINE 4 MG/1
TABLET ORAL
Qty: 30 TABLET | Refills: 3 | Status: SHIPPED | OUTPATIENT
Start: 2017-10-25 | End: 2018-04-16

## 2017-10-25 RX ORDER — TRIAMCINOLONE ACETONIDE 1 MG/G
CREAM TOPICAL 2 TIMES DAILY
Qty: 28.4 G | Refills: 3 | Status: SHIPPED | OUTPATIENT
Start: 2017-10-25 | End: 2018-04-16

## 2017-10-25 RX ORDER — LEFLUNOMIDE 20 MG/1
TABLET ORAL
Qty: 16 TABLET | Refills: 3 | Status: SHIPPED | OUTPATIENT
Start: 2017-10-25 | End: 2018-04-06 | Stop reason: SDUPTHER

## 2017-10-25 ASSESSMENT — ROUTINE ASSESSMENT OF PATIENT INDEX DATA (RAPID3): MDHAQ FUNCTION SCORE: .2

## 2017-10-25 NOTE — PROGRESS NOTES
CHIEF COMPLAINT:  Psoriatic arthritis.    PERTINENT HISTORY:  Maricarmen is followed here with chronic psoriasis and psoriatic   arthritis.  She has been on Humira now for a good while every other week and   doing well.  She had reaction to methotrexate with lung aggravation and some   scar tissue, switched to Arava which she has taken 20 mg every other day.  She   had a mild flare of her psoriasis about a month ago, involve particularly her   arms, a little bit on her hips.  It is a little bit better now.  She needs a   cream to put on that.  She has not had any swollen or tender joints.  has severe   back pain regularly, but does have mild degenerative disk disease by MRI   several years ago.  She was taking some Neurontin in the evening for her   numbness and tingling and pain in her legs and feet.  They raised the dose to   300 mg that knocked her out and she has quit it.  Feet are stable now and if we   have to resume it, we would probably just come back to the 100 mg at night one   or two doses.    REVIEW OF SYSTEMS:  GENERAL:  Her weight is stable.  She is still working.  She has had no   infections.  IMMUNIZATIONS:  She needs a flu shot.  She needs Zoster.  HEENT:  With no rash on her scalp.  No eye involvement.  CARDIAC:  With prior murmur, but no angina or serious heart disease.  LUNGS:  With mild interstitial disease and some prior pulmonary nodules, which   have been stable.  Sees Pulmonary.  GI TRACT:  Good.  ENDOCRINE:  On thyroid replacement.  BACK:  Degenerative disease and intermittent muscle spasms, Zanaflex she uses   particularly at night.  She also uses Celebrex 200 mg a day.  SKIN:  Psoriasis, mild flare.  See HPI.  No medication reactions or infections   or any MS like findings, CHF, etc.    PHYSICAL EXAM:  VITAL SIGNS:  With a height 5 feet 7 inches, blood pressure 116/80 with a pulse   in the 60s, pain score is 3.  BMI 24.  MUSCULOSKELETAL:  Her exam of her hand shows no active heat, redness, or    swelling involving PIPs MPs, or wrists.  The DIPs do not have any changes either   today.  Just one Heberden on left fourth noted and the right second.  Her elbow   and shoulder mobility is still good.  Hips and knees move well with no fluid in   the knees.  Ankles move well.  No MTP tenderness.  Spine is nontender today.  SKIN:  With minimal rash now in the arms and with the elbows from the psoriasis.    Scalp is clear.  HEENT AND NECK:  Normal.    LABS:  Shows her white count of 4500, hematocrit 42% with platelet count   228,000.  Chemistries with normal LFTs, creatinine, electrolytes.  Sed rate is   2.  CRP is 0.2.    IMPRESSION:  1.  Psoriatic arthritis.  CDAI is 3.  GURJIT score 0.2.  2.  Medication monitoring, Humira, no toxicity issues.  3.  Immunization status, needs flu shot.  I encouraged her to get shingles   vaccine.  4.  Degenerative disease - chronic back pain.    PLAN:  Refilled her meds.  We will hold off gabapentin for now, but if her foot   and leg pain occurs, can go back to a lower dose of 100 to 200 mg at night.    Refill Zanaflex.  Encouraged her to exercise per endurance.  Gave her high   strength flu shot today and I will have Francesca call her and see if she wants to   get the shingles shot for free.      JANIS  dd: 10/25/2017 09:04:35 (CDT)  td: 10/26/2017 00:18:27 (CDT)  Doc ID   #[id number]  Job ID #[id number]    CC:

## 2017-10-25 NOTE — PROGRESS NOTES
Administered 0.5cc High dose Flu Vaccination to Left Deltoid. Pt tolerated well. No acute reaction noted to site. Pt instructed on S/S to report. Pt verbalized understanding. Patient waited 15 mins.     Lot:[id number]  Exp:04/13/2018  Manu: Nba and [employer]

## 2017-10-25 NOTE — PATIENT INSTRUCTIONS
No change in meds    Kenalog cream given to use if skin flare    Do not take Humira/Arava if having fever, have an infection,  on an antibiotic, or having surgery in next week. Stay off 1-2 weeks until infection gone or healed from surgery. Call us if any question

## 2017-12-21 RX ORDER — ADALIMUMAB 40MG/0.8ML
KIT SUBCUTANEOUS
Qty: 2 EACH | Refills: 4 | Status: SHIPPED | OUTPATIENT
Start: 2017-12-21 | End: 2018-06-05 | Stop reason: SDUPTHER

## 2018-01-08 ENCOUNTER — OFFICE VISIT (OUTPATIENT)
Dept: URGENT CARE | Facility: CLINIC | Age: 63
End: 2018-01-08
Payer: COMMERCIAL

## 2018-01-08 VITALS
HEART RATE: 92 BPM | WEIGHT: 152 LBS | SYSTOLIC BLOOD PRESSURE: 112 MMHG | OXYGEN SATURATION: 97 % | TEMPERATURE: 98 F | DIASTOLIC BLOOD PRESSURE: 84 MMHG | BODY MASS INDEX: 23.86 KG/M2 | HEIGHT: 67 IN

## 2018-01-08 DIAGNOSIS — R09.82 PND (POST-NASAL DRIP): ICD-10-CM

## 2018-01-08 DIAGNOSIS — R05.9 COUGH: ICD-10-CM

## 2018-01-08 DIAGNOSIS — J32.9 SINUSITIS, UNSPECIFIED CHRONICITY, UNSPECIFIED LOCATION: Primary | ICD-10-CM

## 2018-01-08 PROCEDURE — 99999 PR PBB SHADOW E&M-EST. PATIENT-LVL IV: CPT | Mod: PBBFAC,,, | Performed by: NURSE PRACTITIONER

## 2018-01-08 PROCEDURE — 99214 OFFICE O/P EST MOD 30 MIN: CPT | Mod: S$GLB,,, | Performed by: NURSE PRACTITIONER

## 2018-01-08 RX ORDER — DOXYCYCLINE 100 MG/1
100 CAPSULE ORAL EVERY 12 HOURS
Qty: 14 CAPSULE | Refills: 0 | Status: SHIPPED | OUTPATIENT
Start: 2018-01-08 | End: 2018-01-15

## 2018-01-08 RX ORDER — FLUTICASONE PROPIONATE 50 MCG
2 SPRAY, SUSPENSION (ML) NASAL DAILY
Qty: 16 G | Refills: 0 | Status: SHIPPED | OUTPATIENT
Start: 2018-01-08 | End: 2018-02-07

## 2018-01-08 RX ORDER — PROMETHAZINE HYDROCHLORIDE AND DEXTROMETHORPHAN HYDROBROMIDE 6.25; 15 MG/5ML; MG/5ML
5 SYRUP ORAL NIGHTLY PRN
Qty: 118 ML | Refills: 0 | Status: SHIPPED | OUTPATIENT
Start: 2018-01-08 | End: 2018-01-18

## 2018-01-09 NOTE — PROGRESS NOTES
Subjective:       Patient ID: Maricarmen Becerril is a 62 y.o. female.    Chief Complaint: URI (cough, congestion)    Pt is a 62 year old female to clinic today with complaints of cough and congestion that began 5 months ago.       URI    This is a recurrent problem. The current episode started more than 1 month ago. The problem has been waxing and waning. There has been no fever. Associated symptoms include congestion, coughing, rhinorrhea and sinus pain. Pertinent negatives include no abdominal pain, chest pain, diarrhea, dysuria, ear pain, headaches, joint pain, joint swelling, nausea, neck pain, plugged ear sensation, rash, sneezing, sore throat, swollen glands, vomiting or wheezing. She has tried nothing for the symptoms.     Review of Systems   Constitutional: Negative for chills, diaphoresis, fatigue and fever.   HENT: Positive for congestion, postnasal drip, rhinorrhea, sinus pain and sinus pressure. Negative for ear discharge, ear pain, sneezing, sore throat and trouble swallowing.    Eyes: Negative for pain.   Respiratory: Positive for cough. Negative for chest tightness, shortness of breath and wheezing.    Cardiovascular: Negative for chest pain and palpitations.   Gastrointestinal: Negative for abdominal pain, diarrhea, nausea and vomiting.   Genitourinary: Negative for dysuria.   Musculoskeletal: Negative for back pain, joint pain, myalgias and neck pain.   Skin: Negative for rash.   Neurological: Negative for dizziness, light-headedness and headaches.       Objective:      Physical Exam   Constitutional: She is oriented to person, place, and time. She appears well-developed and well-nourished. No distress.   HENT:   Head: Normocephalic.   Right Ear: Tympanic membrane, external ear and ear canal normal. No tenderness. Tympanic membrane is not bulging.   Left Ear: Tympanic membrane, external ear and ear canal normal. No tenderness. Tympanic membrane is not bulging.   Nose: Mucosal edema present.  No rhinorrhea. Right sinus exhibits maxillary sinus tenderness. Right sinus exhibits no frontal sinus tenderness. Left sinus exhibits maxillary sinus tenderness. Left sinus exhibits no frontal sinus tenderness.   Mouth/Throat: Uvula is midline, oropharynx is clear and moist and mucous membranes are normal. No oropharyngeal exudate, posterior oropharyngeal edema or posterior oropharyngeal erythema.   Eyes: Conjunctivae and EOM are normal. Pupils are equal, round, and reactive to light.   Neck: Normal range of motion. Neck supple.   Cardiovascular: Normal rate, regular rhythm, S1 normal, S2 normal and normal heart sounds.  Exam reveals no gallop and no friction rub.    No murmur heard.  Pulmonary/Chest: Effort normal and breath sounds normal. No accessory muscle usage. No apnea, no tachypnea and no bradypnea. No respiratory distress. She has no decreased breath sounds. She has no wheezes. She has no rhonchi. She has no rales.   Lymphadenopathy:        Head (right side): No submental, no submandibular and no tonsillar adenopathy present.        Head (left side): No submental, no submandibular and no tonsillar adenopathy present.     She has no cervical adenopathy.   Neurological: She is alert and oriented to person, place, and time.   Skin: Skin is warm and dry. No rash noted. She is not diaphoretic.   Psychiatric: She has a normal mood and affect. Her speech is normal and behavior is normal. Thought content normal.   Nursing note and vitals reviewed.      Assessment:       1. Sinusitis, unspecified chronicity, unspecified location    2. PND (post-nasal drip)    3. Cough        Plan:   Sinusitis, unspecified chronicity, unspecified location  -     doxycycline (VIBRAMYCIN) 100 MG Cap; Take 1 capsule (100 mg total) by mouth every 12 (twelve) hours.  Dispense: 14 capsule; Refill: 0    PND (post-nasal drip)  -     fluticasone (FLONASE) 50 mcg/actuation nasal spray; 2 sprays (100 mcg total) by Each Nare route once daily.   Dispense: 16 g; Refill: 0    Cough  -     promethazine-dextromethorphan (PROMETHAZINE-DM) 6.25-15 mg/5 mL Syrp; Take 5 mLs by mouth nightly as needed.  Dispense: 118 mL; Refill: 0      · Rest and increase fluids.   · May apply warm compresses as needed.   · Saline nasal spray or saline irrigation (Neti pot) to loosen nasal congestion.  · Flonase or Nasacort to reduce inflammation in the sinus cavities.  · Take antibiotics exactly as prescribed. Make sure to complete the entire course of antibiotics even if you start feeling better. This will prevent recurrence of your infection and bacterial resistance.   · Do not drive, drink alcohol, or take any other sedating medications or substances while taking cough syrup.   · Follow up with your primary care provider or with ENT if not improved within a few days or sooner for any new or worsening symptoms.   · Go to the ER for any fever that does not improve with Tylenol/Ibuprofen, neck stiffness, rash, severe headache, vision changes, shortness of breath, chest pain, severe facial pain or swelling, or for any other new and concerning symptoms.

## 2018-01-09 NOTE — PATIENT INSTRUCTIONS
Sinusitis (Antibiotic Treatment)    The sinuses are air-filled spaces within the bones of the face. They connect to the inside of the nose. Sinusitis is an inflammation of the tissue lining the sinus cavity. Sinus inflammation can occur during a cold. It can also be due to allergies to pollens and other particles in the air. Sinusitis can cause symptoms of sinus congestion and fullness. A sinus infection causes fever, headache and facial pain. There is often green or yellow drainage from the nose or into the back of the throat (post-nasal drip). You have been given antibiotics to treat this condition.  Home care:  · Take the full course of antibiotics as instructed. Do not stop taking them, even if you feel better.  · Drink plenty of water, hot tea, and other liquids. This may help thin mucus. It also may promote sinus drainage.  · Heat may help soothe painful areas of the face. Use a towel soaked in hot water. Or,  the shower and direct the hot spray onto your face. Using a vaporizer along with a menthol rub at night may also help.   · An expectorant containing guaifenesin may help thin the mucus and promote drainage from the sinuses.  · Over-the-counter decongestants may be used unless a similar medicine was prescribed. Nasal sprays work the fastest. Use one that contains phenylephrine or oxymetazoline. First blow the nose gently. Then use the spray. Do not use these medicines more often than directed on the label or symptoms may get worse. You may also use tablets containing pseudoephedrine. Avoid products that combine ingredients, because side effects may be increased. Read labels. You can also ask the pharmacist for help. (NOTE: Persons with high blood pressure should not use decongestants. They can raise blood pressure.)  · Over-the-counter antihistamines may help if allergies contributed to your sinusitis.    · Do not use nasal rinses or irrigation during an acute sinus infection, unless told to by  your health care provider. Rinsing may spread the infection to other sinuses.  · Use acetaminophen or ibuprofen to control pain, unless another pain medicine was prescribed. (If you have chronic liver or kidney disease or ever had a stomach ulcer, talk with your doctor before using these medicines. Aspirin should never be used in anyone under 18 years of age who is ill with a fever. It may cause severe liver damage.)  · Don't smoke. This can worsen symptoms.  Follow-up care  Follow up with your healthcare provider or our staff if you are not improving within the next week.  When to seek medical advice  Call your healthcare provider if any of these occur:  · Facial pain or headache becoming more severe  · Stiff neck  · Unusual drowsiness or confusion  · Swelling of the forehead or eyelids  · Vision problems, including blurred or double vision  · Fever of 100.4ºF (38ºC) or higher, or as directed by your healthcare provider  · Seizure  · Breathing problems  · Symptoms not resolving within 10 days  Date Last Reviewed: 4/13/2015  © 4958-6195 [employer]. [address] [place] [address], [place] [address]. All rights reserved. This information is not intended as a substitute for professional medical care. Always follow your healthcare professional's instructions.

## 2018-01-31 ENCOUNTER — DOCUMENTATION ONLY (OUTPATIENT)
Dept: ENDOSCOPY | Facility: HOSPITAL | Age: 63
End: 2018-01-31

## 2018-01-31 NOTE — PROGRESS NOTES
"1/31/2018 0920 Phoned pt to confirm that she wanted to cancel endoscopy procedure.  Pt stated "yes" she does NOT want to have the colonoscopy.  Informed pt she may speak with PCP about a Fit Kit as an alternative since she is refusing colonoscopy.  Pt verbalizes understanding.     On 1/30/2018,  Per [employer], Person answered and pressed touch tone key to indicate 'No' as the response//nw  "

## 2018-02-09 ENCOUNTER — PATIENT OUTREACH (OUTPATIENT)
Dept: ADMINISTRATIVE | Facility: HOSPITAL | Age: 63
End: 2018-02-09

## 2018-02-26 NOTE — PROGRESS NOTES
"Subjective:       Patient ID: Maricarmen Becerril is a 62 y.o. female.    Chief Complaint: PsA, psoriasis    Maricarmen is here for routine follow up for psoriatic arthritis with psoriasis.  She in the past has been on mtx but this was discontinued due to development of ILD and lung nodules.  She is currently on Arava 20mg every other day and humira 40mg every other week and doing well.  Her joint pain is 0/10.  She has no acute swollen joints or digits.  She has chronic neck and back pain due to degenerative arthritis, bulging discs (mri l spine in 2013 with disc disease, deg arthritis). Celebrex daily helps.  Off gabapentin, bipin her sleepy.  She sees pulmonary for her lungs and her last CT was stable.  She has a rash to her lower legs, small red spots, not itchy.  Dr. Gu saw last visit and rx'd kenalog topical but she hasn't used it.       Review of Systems   Constitutional: Negative for chills, fatigue and fever.   HENT: Negative for mouth sores, rhinorrhea and sore throat.    Eyes: Negative for pain and redness.   Respiratory: Negative for cough and shortness of breath.    Cardiovascular: Negative for chest pain.   Gastrointestinal: Negative for abdominal pain, constipation, diarrhea, nausea and vomiting.   Genitourinary: Negative for dysuria and hematuria.   Musculoskeletal: Positive for arthralgias, back pain and neck pain. Negative for joint swelling and myalgias.   Skin: Positive for rash.   Neurological: Positive for numbness. Negative for weakness and headaches.   Psychiatric/Behavioral: The patient is not nervous/anxious.          Objective:   BP (!) 145/86   Pulse 60   Ht 5' 7" (1.702 m)   Wt 68.2 kg (150 lb 5.7 oz)   BMI 23.55 kg/m²      Physical Exam   Constitutional: She is oriented to person, place, and time and well-developed, well-nourished, and in no distress.   HENT:   Head: Normocephalic and atraumatic.   Eyes: Pupils are equal, round, and reactive to light. Right eye exhibits no " discharge.   Neck: Normal range of motion.   Cardiovascular: Normal rate, regular rhythm and normal heart sounds.  Exam reveals no friction rub.    Pulmonary/Chest: Effort normal and breath sounds normal. No respiratory distress.   Abdominal: Soft. She exhibits no distension. There is no tenderness.   Lymphadenopathy:     She has no cervical adenopathy.   Neurological: She is alert and oriented to person, place, and time.   Skin: Rash noted. No erythema.     BLE with several small spots of erythema almost petechial spots, no real scaly dry areas   Psychiatric: Mood normal.   Musculoskeletal: Normal range of motion. She exhibits no edema or deformity.   liya hands with oa changes to her pip and dip joints, no synovitis, no dactylitis noted.      Full rom to liya wrists, mcps, pips    Full rom liya knees       No results found for this or any previous visit (from the past 168 hour(s)).         Labwork done at [facility] on industri[facility]lex, not done yet    Ct chest 6-14-16: Previously demonstrated tiny pulmonary nodules are not identified. There are no new nodules. There are one or 2 tiny nodules at the lateral left lung base. No pulmonary infiltrates or pleural effusions. No abnormality of the airway. No evidence of pathologic hilar or mediastinal lymphadenopathy     Assessment:       1. Psoriatic arthritis    2. Psoriasis    3. Restrictive lung disease    4. Multiple lung nodules    5. Medication monitoring encounter    6. Immunocompromised        Impression:    Psoriatic arthritis with h/o psoriasis:  Well controlled on humira 40mg every other week and arava 20mg every other day, celebrex 200mg/d; joints doing well, cdai 0, mhaq 0.2; failed mtx due to development of ILD/lung nodules; mhaq 0.2, cdai 0    Rash: liya lower legs, unsure if psoriasis rash vs drug rxn or other, has kenalog cream but hasn't used it    Medication monitoring: no signs of toxicity, need to review labwork, get from [facility]    Immunocompromised:  utd  except zoster, she is declining the vaccine at this time    Lung disease/lung nodules: thought due to mtx, pulm following, this is stable    DJD spine, neck back: ok today, with radicular symptoms liya legs, stable, celebrex helps; off gabapentin made her drowsy, takes tumeric daily     Plan:   Continue humira 40mg every other week and arava 20mg every other day--could consider coming off arava in future, she would like to decrease the meds she takes  [facility]  Cont f/u with pulm for ILD  Encouraged zoster vaccine again, she really doesn't want to do this, she knows she needs to hold her dmard/biologic when she gets this, candidate for verve but no interested  Use kenalog cream for legs, may need to see derm if persists    Return in 4 mos with labwork done prior to appt at [facility]

## 2018-02-27 ENCOUNTER — TELEPHONE (OUTPATIENT)
Dept: RESEARCH | Facility: HOSPITAL | Age: 63
End: 2018-02-27

## 2018-02-28 ENCOUNTER — OFFICE VISIT (OUTPATIENT)
Dept: RHEUMATOLOGY | Facility: CLINIC | Age: 63
End: 2018-02-28
Payer: COMMERCIAL

## 2018-02-28 VITALS
DIASTOLIC BLOOD PRESSURE: 86 MMHG | BODY MASS INDEX: 23.6 KG/M2 | WEIGHT: 150.38 LBS | HEIGHT: 67 IN | SYSTOLIC BLOOD PRESSURE: 145 MMHG | HEART RATE: 60 BPM

## 2018-02-28 DIAGNOSIS — L40.50 PSORIATIC ARTHRITIS: Primary | ICD-10-CM

## 2018-02-28 DIAGNOSIS — D84.9 IMMUNOCOMPROMISED: ICD-10-CM

## 2018-02-28 DIAGNOSIS — L40.9 PSORIASIS: ICD-10-CM

## 2018-02-28 DIAGNOSIS — R91.8 MULTIPLE LUNG NODULES: ICD-10-CM

## 2018-02-28 DIAGNOSIS — J98.4 RESTRICTIVE LUNG DISEASE: ICD-10-CM

## 2018-02-28 DIAGNOSIS — Z51.81 MEDICATION MONITORING ENCOUNTER: ICD-10-CM

## 2018-02-28 PROCEDURE — 99999 PR PBB SHADOW E&M-EST. PATIENT-LVL III: CPT | Mod: PBBFAC,,, | Performed by: PHYSICIAN ASSISTANT

## 2018-02-28 PROCEDURE — 3008F BODY MASS INDEX DOCD: CPT | Mod: S$GLB,,, | Performed by: PHYSICIAN ASSISTANT

## 2018-02-28 PROCEDURE — 99214 OFFICE O/P EST MOD 30 MIN: CPT | Mod: S$GLB,,, | Performed by: PHYSICIAN ASSISTANT

## 2018-02-28 ASSESSMENT — ROUTINE ASSESSMENT OF PATIENT INDEX DATA (RAPID3): MDHAQ FUNCTION SCORE: .2

## 2018-03-08 LAB
ALBUMIN SERPL-MCNC: 4.5 G/DL (ref 3.6–4.8)
ALBUMIN/GLOB SERPL: 1.4 {RATIO} (ref 1.2–2.2)
ALP SERPL-CCNC: 55 IU/L (ref 39–117)
ALT SERPL-CCNC: 24 IU/L (ref 0–32)
AMBIG ABBREV CMP 14 DEFAULT: NORMAL
AST SERPL-CCNC: 25 IU/L (ref 0–40)
BASOPHILS # BLD AUTO: 0 X10E3/UL (ref 0–0.2)
BASOPHILS NFR BLD AUTO: 1 %
BILIRUB SERPL-MCNC: 0.8 MG/DL (ref 0–1.2)
BUN SERPL-MCNC: 18 MG/DL (ref 8–27)
BUN/CREAT SERPL: 29 (ref 12–28)
CALCIUM SERPL-MCNC: 10.1 MG/DL (ref 8.7–10.3)
CHLORIDE SERPL-SCNC: 100 MMOL/L (ref 96–106)
CO2 SERPL-SCNC: 25 MMOL/L (ref 18–29)
CREAT SERPL-MCNC: 0.62 MG/DL (ref 0.57–1)
CRP SERPL-MCNC: 0.2 MG/L (ref 0–4.9)
EOSINOPHIL # BLD AUTO: 0.4 X10E3/UL (ref 0–0.4)
EOSINOPHIL NFR BLD AUTO: 8 %
ERYTHROCYTE [DISTWIDTH] IN BLOOD BY AUTOMATED COUNT: 14.1 % (ref 12.3–15.4)
ERYTHROCYTE [SEDIMENTATION RATE] IN BLOOD BY WESTERGREN METHOD: 5 MM/HR (ref 0–40)
GFR SERPLBLD CREATININE-BSD FMLA CKD-EPI: 112 ML/MIN/1.73
GFR SERPLBLD CREATININE-BSD FMLA CKD-EPI: 97 ML/MIN/1.73
GLOBULIN SER CALC-MCNC: 3.2 G/DL (ref 1.5–4.5)
GLUCOSE SERPL-MCNC: 86 MG/DL (ref 65–99)
HCT VFR BLD AUTO: 41.6 % (ref 34–46.6)
HGB BLD-MCNC: 13.8 G/DL (ref 11.1–15.9)
IMM GRANULOCYTES # BLD: 0 X10E3/UL (ref 0–0.1)
IMM GRANULOCYTES NFR BLD: 0 %
LYMPHOCYTES # BLD AUTO: 2 X10E3/UL (ref 0.7–3.1)
LYMPHOCYTES NFR BLD AUTO: 46 %
MCH RBC QN AUTO: 30.2 PG (ref 26.6–33)
MCHC RBC AUTO-ENTMCNC: 33.2 G/DL (ref 31.5–35.7)
MCV RBC AUTO: 91 FL (ref 79–97)
MONOCYTES # BLD AUTO: 0.4 X10E3/UL (ref 0.1–0.9)
MONOCYTES NFR BLD AUTO: 9 %
NEUTROPHILS # BLD AUTO: 1.6 X10E3/UL (ref 1.4–7)
NEUTROPHILS NFR BLD AUTO: 36 %
PLATELET # BLD AUTO: 233 X10E3/UL (ref 150–379)
POTASSIUM SERPL-SCNC: 4.4 MMOL/L (ref 3.5–5.2)
PROT SERPL-MCNC: 7.7 G/DL (ref 6–8.5)
RBC # BLD AUTO: 4.57 X10E6/UL (ref 3.77–5.28)
SODIUM SERPL-SCNC: 141 MMOL/L (ref 134–144)
WBC # BLD AUTO: 4.4 X10E3/UL (ref 3.4–10.8)

## 2018-03-15 DIAGNOSIS — L40.50 PSORIATIC ARTHRITIS: ICD-10-CM

## 2018-03-16 RX ORDER — CELECOXIB 200 MG/1
CAPSULE ORAL
Qty: 90 CAPSULE | Refills: 1 | Status: SHIPPED | OUTPATIENT
Start: 2018-03-16 | End: 2018-09-17 | Stop reason: SDUPTHER

## 2018-04-06 DIAGNOSIS — L40.50 PSORIATIC ARTHRITIS: ICD-10-CM

## 2018-04-09 RX ORDER — LEFLUNOMIDE 20 MG/1
TABLET ORAL
Qty: 16 TABLET | Refills: 5 | Status: SHIPPED | OUTPATIENT
Start: 2018-04-09 | End: 2019-05-13 | Stop reason: SDUPTHER

## 2018-04-11 ENCOUNTER — TELEPHONE (OUTPATIENT)
Dept: INTERNAL MEDICINE | Facility: CLINIC | Age: 63
End: 2018-04-11

## 2018-04-11 DIAGNOSIS — E03.9 HYPOTHYROIDISM, UNSPECIFIED TYPE: Primary | ICD-10-CM

## 2018-04-11 DIAGNOSIS — Z13.220 SCREENING FOR CHOLESTEROL LEVEL: ICD-10-CM

## 2018-04-12 LAB
AMBIG ABBREV LP DEFAULT: NORMAL
CHOLEST SERPL-MCNC: 260 MG/DL (ref 100–199)
HDLC SERPL-MCNC: 84 MG/DL
LDLC SERPL CALC-MCNC: 166 MG/DL (ref 0–99)
TRIGL SERPL-MCNC: 50 MG/DL (ref 0–149)
TSH SERPL DL<=0.005 MIU/L-ACNC: 2.17 UIU/ML (ref 0.45–4.5)
VLDLC SERPL CALC-MCNC: 10 MG/DL (ref 5–40)

## 2018-04-16 ENCOUNTER — OFFICE VISIT (OUTPATIENT)
Dept: INTERNAL MEDICINE | Facility: CLINIC | Age: 63
End: 2018-04-16
Payer: COMMERCIAL

## 2018-04-16 VITALS
BODY MASS INDEX: 23.84 KG/M2 | WEIGHT: 151.88 LBS | DIASTOLIC BLOOD PRESSURE: 68 MMHG | OXYGEN SATURATION: 96 % | SYSTOLIC BLOOD PRESSURE: 108 MMHG | TEMPERATURE: 96 F | HEART RATE: 75 BPM | HEIGHT: 67 IN

## 2018-04-16 DIAGNOSIS — Z00.00 ROUTINE HEALTH MAINTENANCE: Primary | ICD-10-CM

## 2018-04-16 DIAGNOSIS — D84.9 IMMUNOCOMPROMISED: ICD-10-CM

## 2018-04-16 DIAGNOSIS — L40.50 PSORIATIC ARTHRITIS: ICD-10-CM

## 2018-04-16 DIAGNOSIS — E78.00 HYPERCHOLESTEREMIA: ICD-10-CM

## 2018-04-16 DIAGNOSIS — E03.9 HYPOTHYROIDISM, UNSPECIFIED TYPE: ICD-10-CM

## 2018-04-16 PROCEDURE — 99999 PR PBB SHADOW E&M-EST. PATIENT-LVL III: CPT | Mod: PBBFAC,,, | Performed by: FAMILY MEDICINE

## 2018-04-16 PROCEDURE — 99396 PREV VISIT EST AGE 40-64: CPT | Mod: S$GLB,,, | Performed by: FAMILY MEDICINE

## 2018-04-16 RX ORDER — MINERAL OIL
180 ENEMA (ML) RECTAL DAILY
Qty: 30 TABLET | Refills: 1 | Status: SHIPPED | OUTPATIENT
Start: 2018-04-16 | End: 2018-07-03 | Stop reason: SDUPTHER

## 2018-04-16 RX ORDER — LEVOTHYROXINE SODIUM 75 UG/1
75 TABLET ORAL EVERY MORNING
Qty: 90 TABLET | Refills: 3 | Status: SHIPPED | OUTPATIENT
Start: 2018-04-16 | End: 2019-02-12 | Stop reason: DRUGHIGH

## 2018-04-16 NOTE — PROGRESS NOTES
Subjective:       Patient ID: Maricarmen Becerril is a . female.    Chief Complaint: here for physical examination and issues  below    HPI Hypothyroidism: nl  [facility] tsh. Tolerating med. asympt  intersit lungdx/nodules pulm  due 7/17 but ct chest and pfts ok and no change sympt so she deferred  psor arth utd rheum    4 months dry cough that follwed couple uris.more w laying down. Chronic allergies; no inc sob. No cp    Past Medical History   Diagnosis Date    Psoriatic arthritis     Positive SANA (antinuclear antibody)     Allergic rhinitis     Hypothyroidism      SECONDARY    Lung disease      mtx related    Allergy     Arthritis     Acid reflux     Heart murmur     Dry mouth      Past Surgical History   Procedure Laterality Date    Thyroidectomy      Tonsillectomy      Mandible fracture surgery      Other surgical history       uterine suspension    Dilation and curettage of uterus       Family History   Problem Relation Age of Onset    Parkinsonism Mother     Cataracts Mother     Gout Father     Hypothyroidism Father     Cataracts Father     Hypertension Father      History     Social History    Marital Status:      Spouse Name: BOLIVAR     Number of Children: N/A    Years of Education: N/A     Occupational History          Social History Main Topics    Smoking status: Never Smoker     Smokeless tobacco: Never Used    Alcohol Use: No    Drug Use: No    Sexual Activity: Not on file     Other Topics Concern    Not on file     Social History Narrative       Review of Systems  Cardiovascular: no chest pain    Abd: no abd pain  Remainder review of systems negative    Objective:      Physical Exam   Constitutional: She is oriented to person, place, and time. She appears well-developed and well-nourished. No distress.   HENT:   Head: Atraumatic.   Right Ear: External ear normal.   Left Ear: External ear normal.   Nose: Nose normal.x nasal jimy   Mouth/Throat: Oropharynx is  clear and moist. No oropharyngeal exudate.   bilat tms nl   Eyes: Conjunctivae and EOM are normal. Pupils are equal, round, and reactive to light. No scleral icterus.   Neck: Normal range of motion. Neck supple. No thyromegaly present.   Cardiovascular: Normal rate, regular rhythm and normal heart sounds.    No murmur heard.  Pulmonary/Chest: Effort normal and breath sounds normal. No respiratory distress. She has no wheezes. She has no rales.   Abdominal: Soft. Bowel sounds are normal. She exhibits no distension and no mass. There is no hepatosplenomegaly. There is no tenderness. There is no rebound and no guarding.   Musculoskeletal: Normal range of motion. She exhibits no edema or tenderness.   Lymphadenopathy:     She has no cervical adenopathy.   Neurological: She is alert and oriented to person, place, and time. No cranial nerve deficit. She exhibits normal muscle tone. Coordination normal.   Skin: Skin is warm. No rash noted. No erythema. No pallor.   Psychiatric: She has a normal mood and affect. Her behavior is normal. Judgment and thought content normal.   Nursing note and vitals reviewed.      Assessment:       1. Routine health maintenance    2. Hypothyroidism, unspecified hypothyroidism type          Psori. Arthritis  All rhin  Chronic cough  Plan:     F/u one yr:w lab [facility]  *    F/u rheum when due  .**  Add:note prev d/wd 2011 mri show liver lesions most likely hemangiomas w recomm fede; d/w this and reason for but she declined  She plans to call to sched mammo. Pap cscope when she retires soon      D/wd need cough to resolve and if doesn't then eval:start w allegra (declines flonase) daily at least couple weeks notify if not resolved    Routine health maintenance    Hypothyroidism, unspecified type  -     TSH; Future; Expected date: 04/16/2019    Immunocompromised    Psoriatic arthritis    Hypercholesteremia  -     Lipid panel; Future; Expected date: 04/16/2019    Other orders  -     fexofenadine  (ALLEGRA) 180 MG tablet; Take 1 tablet (180 mg total) by mouth once daily.  Dispense: 30 tablet; Refill: 1  -     levothyroxine (SYNTHROID) 75 MCG tablet; Take 1 tablet (75 mcg total) by mouth every morning.  Dispense: 90 tablet; Refill: 3

## 2018-06-05 RX ORDER — ADALIMUMAB 40MG/0.8ML
KIT SUBCUTANEOUS
Qty: 2 EACH | Refills: 4 | Status: SHIPPED | OUTPATIENT
Start: 2018-06-05 | End: 2018-09-21 | Stop reason: SDUPTHER

## 2018-06-18 NOTE — PROGRESS NOTES
"Subjective:       Patient ID: Maricarmen Becerril is a 63 y.o. female.    Chief Complaint: PsA, psoriasis    Maricarmen is here for routine follow up for psoriatic arthritis with psoriasis.  She in the past has been on mtx but this was discontinued due to development of ILD and lung nodules.  She is currently on Arava 20mg every other day and humira 40mg every other week and doing well.  Her joint pain is 0/10.  She has no acute swollen joints or digits.  She has chronic neck and back pain due to degenerative arthritis, bulging discs (mri l spine in 2013 with disc disease, deg arthritis). Celebrex daily helps but still c/o numbness and burning in her feet at night.  Off gabapentin, bipin her sleepy.  She sees pulmonary for her lungs and her last CT was stable.  No psoriasis rash, had rash on her legs last two visits but this has improved.       Review of Systems   Constitutional: Negative for chills, fatigue and fever.   HENT: Negative for mouth sores, rhinorrhea and sore throat.    Eyes: Negative for pain and redness.   Respiratory: Negative for cough and shortness of breath.    Cardiovascular: Negative for chest pain.   Gastrointestinal: Negative for abdominal pain, constipation, diarrhea, nausea and vomiting.   Genitourinary: Negative for dysuria and hematuria.   Musculoskeletal: Positive for arthralgias, back pain and neck pain. Negative for joint swelling and myalgias.   Skin: Negative for rash.   Neurological: Positive for numbness. Negative for weakness and headaches.   Psychiatric/Behavioral: The patient is not nervous/anxious.          Objective:   /86   Pulse (!) 58   Ht 5' 7" (1.702 m)   Wt 70 kg (154 lb 5.2 oz)   BMI 24.17 kg/m²      Physical Exam   Constitutional: She is oriented to person, place, and time and well-developed, well-nourished, and in no distress.   HENT:   Head: Normocephalic and atraumatic.   Eyes: Pupils are equal, round, and reactive to light. Right eye exhibits no " discharge.   Neck: Normal range of motion.   Cardiovascular: Normal rate, regular rhythm and normal heart sounds.  Exam reveals no friction rub.    Pulmonary/Chest: Effort normal and breath sounds normal. No respiratory distress.   Abdominal: Soft. She exhibits no distension. There is no tenderness.   Lymphadenopathy:     She has no cervical adenopathy.   Neurological: She is alert and oriented to person, place, and time.   Skin: No erythema.     No psoriasis rash   Psychiatric: Mood normal.   Musculoskeletal: Normal range of motion. She exhibits no edema or deformity.   liya hands with oa changes to her pip and dip joints, no synovitis, no dactylitis noted.      Full rom to liya wrists, mcps, pips    Full rom liya knees       No results found for this or any previous visit (from the past 168 hour(s)).         Labwork done at [facility] on industriuplex, not done yet    Ct chest 6-14-16: Previously demonstrated tiny pulmonary nodules are not identified. There are no new nodules. There are one or 2 tiny nodules at the lateral left lung base. No pulmonary infiltrates or pleural effusions. No abnormality of the airway. No evidence of pathologic hilar or mediastinal lymphadenopathy     Assessment:       1. Psoriatic arthritis    2. Psoriasis    3. Restrictive lung disease    4. Medication monitoring encounter    5. Immunocompromised        Impression:    Psoriatic arthritis with h/o psoriasis:  Well controlled on humira 40mg every other week and arava 20mg every other day, celebrex 200mg/d; joints doing well, cdai 0, mhaq 0.6; failed mtx due to development of ILD/lung nodules;     Medication monitoring: no signs of toxicity, need to review labwork, get from [facility]    Immunocompromised:  utd except zoster, she is declining the vaccine at this time    Lung disease/lung nodules: thought due to mtx, pulm following, this is stable    DJD spine, neck back: ok today, with radicular symptoms liya legs/neuropathy, stable, celebrex  helps; off gabapentin made her drowsy, takes tumeric daily     Plan:   Continue humira 40mg every other week   Can see how she does off arava, if increased joint symptoms can add back to three times weekly   rec trying gabapentin just at night for her neuropathy symptoms--she may still have some at home  Go get [facility]work [facility] this week and fax results to us  Cont f/u with pulm for ILD    Return in 4 mos with labwork done prior to appt at [facility]

## 2018-06-19 ENCOUNTER — OFFICE VISIT (OUTPATIENT)
Dept: RHEUMATOLOGY | Facility: CLINIC | Age: 63
End: 2018-06-19
Payer: COMMERCIAL

## 2018-06-19 VITALS
SYSTOLIC BLOOD PRESSURE: 136 MMHG | HEART RATE: 58 BPM | BODY MASS INDEX: 24.22 KG/M2 | DIASTOLIC BLOOD PRESSURE: 86 MMHG | WEIGHT: 154.31 LBS | HEIGHT: 67 IN

## 2018-06-19 DIAGNOSIS — M47.817 LUMBAR AND SACRAL OSTEOARTHRITIS: ICD-10-CM

## 2018-06-19 DIAGNOSIS — L40.9 PSORIASIS: ICD-10-CM

## 2018-06-19 DIAGNOSIS — L40.50 PSORIATIC ARTHRITIS: Primary | ICD-10-CM

## 2018-06-19 DIAGNOSIS — Z51.81 MEDICATION MONITORING ENCOUNTER: ICD-10-CM

## 2018-06-19 DIAGNOSIS — D84.9 IMMUNOCOMPROMISED: ICD-10-CM

## 2018-06-19 DIAGNOSIS — J98.4 RESTRICTIVE LUNG DISEASE: ICD-10-CM

## 2018-06-19 PROCEDURE — 3008F BODY MASS INDEX DOCD: CPT | Mod: CPTII,S$GLB,, | Performed by: PHYSICIAN ASSISTANT

## 2018-06-19 PROCEDURE — 99999 PR PBB SHADOW E&M-EST. PATIENT-LVL III: CPT | Mod: PBBFAC,,, | Performed by: PHYSICIAN ASSISTANT

## 2018-06-19 PROCEDURE — 99214 OFFICE O/P EST MOD 30 MIN: CPT | Mod: S$GLB,,, | Performed by: PHYSICIAN ASSISTANT

## 2018-06-19 ASSESSMENT — ROUTINE ASSESSMENT OF PATIENT INDEX DATA (RAPID3): MDHAQ FUNCTION SCORE: .6

## 2018-06-19 NOTE — PATIENT INSTRUCTIONS
Neuropathy: can try vitamin B6, or gabapentin just at night     Get labs done    See back in 4 months with [facility] labs done early     Can try to come off arava, see how you do, if joint issues or rash worsen go back on

## 2018-06-21 RX ORDER — LEVOTHYROXINE SODIUM 75 UG/1
TABLET ORAL
Qty: 90 TABLET | Refills: 0 | Status: SHIPPED | OUTPATIENT
Start: 2018-06-21 | End: 2019-08-12 | Stop reason: SDUPTHER

## 2018-07-03 RX ORDER — MINERAL OIL
ENEMA (ML) RECTAL
Qty: 30 TABLET | Refills: 11 | Status: SHIPPED | OUTPATIENT
Start: 2018-07-03 | End: 2019-04-16

## 2018-07-26 LAB
ALBUMIN SERPL-MCNC: 4.5 G/DL (ref 3.6–4.8)
ALBUMIN/GLOB SERPL: 1.6 {RATIO} (ref 1.2–2.2)
ALP SERPL-CCNC: 49 IU/L (ref 39–117)
ALT SERPL-CCNC: 24 IU/L (ref 0–32)
AMBIG ABBREV CMP 14 DEFAULT: NORMAL
AST SERPL-CCNC: 24 IU/L (ref 0–40)
BASOPHILS # BLD AUTO: 0 X10E3/UL (ref 0–0.2)
BASOPHILS NFR BLD AUTO: 1 %
BILIRUB SERPL-MCNC: 0.6 MG/DL (ref 0–1.2)
BUN SERPL-MCNC: 16 MG/DL (ref 8–27)
BUN/CREAT SERPL: 24 (ref 12–28)
CALCIUM SERPL-MCNC: 10.2 MG/DL (ref 8.7–10.3)
CHLORIDE SERPL-SCNC: 103 MMOL/L (ref 96–106)
CO2 SERPL-SCNC: 26 MMOL/L (ref 20–29)
CREAT SERPL-MCNC: 0.66 MG/DL (ref 0.57–1)
CRP SERPL-MCNC: 0.3 MG/L (ref 0–4.9)
EGFR IF AFRICAN AMERICAN: 109 ML/MIN/1.73
EOSINOPHIL # BLD AUTO: 0.4 X10E3/UL (ref 0–0.4)
EOSINOPHIL NFR BLD AUTO: 9 %
ERYTHROCYTE [DISTWIDTH] IN BLOOD BY AUTOMATED COUNT: 14.2 % (ref 12.3–15.4)
ERYTHROCYTE [SEDIMENTATION RATE] IN BLOOD BY WESTERGREN METHOD: 2 MM/HR (ref 0–40)
EST. GFR  (NON AFRICAN AMERICAN): 94 ML/MIN/1.73
GLOBULIN SER CALC-MCNC: 2.9 G/DL (ref 1.5–4.5)
GLUCOSE SERPL-MCNC: 88 MG/DL (ref 65–99)
HCT VFR BLD AUTO: 40.9 % (ref 34–46.6)
HGB BLD-MCNC: 13.4 G/DL (ref 11.1–15.9)
IMM GRANULOCYTES # BLD: 0 X10E3/UL (ref 0–0.1)
IMM GRANULOCYTES NFR BLD: 1 %
LYMPHOCYTES # BLD AUTO: 1.5 X10E3/UL (ref 0.7–3.1)
LYMPHOCYTES NFR BLD AUTO: 35 %
MCH RBC QN AUTO: 30.9 PG (ref 26.6–33)
MCHC RBC AUTO-ENTMCNC: 32.8 G/DL (ref 31.5–35.7)
MCV RBC AUTO: 95 FL (ref 79–97)
MONOCYTES # BLD AUTO: 0.5 X10E3/UL (ref 0.1–0.9)
MONOCYTES NFR BLD AUTO: 12 %
NEUTROPHILS # BLD AUTO: 1.9 X10E3/UL (ref 1.4–7)
NEUTROPHILS NFR BLD AUTO: 42 %
PLATELET # BLD AUTO: 254 X10E3/UL (ref 150–379)
POTASSIUM SERPL-SCNC: 5.3 MMOL/L (ref 3.5–5.2)
PROT SERPL-MCNC: 7.4 G/DL (ref 6–8.5)
RBC # BLD AUTO: 4.33 X10E6/UL (ref 3.77–5.28)
SODIUM SERPL-SCNC: 143 MMOL/L (ref 134–144)
WBC # BLD AUTO: 4.4 X10E3/UL (ref 3.4–10.8)

## 2018-09-17 DIAGNOSIS — L40.50 PSORIATIC ARTHRITIS: ICD-10-CM

## 2018-09-17 RX ORDER — CELECOXIB 200 MG/1
CAPSULE ORAL
Qty: 90 CAPSULE | Refills: 1 | Status: SHIPPED | OUTPATIENT
Start: 2018-09-17 | End: 2019-05-01 | Stop reason: SDUPTHER

## 2018-09-24 RX ORDER — ADALIMUMAB 40MG/0.8ML
KIT SUBCUTANEOUS
Qty: 2 PEN | Refills: 4 | Status: SHIPPED | OUTPATIENT
Start: 2018-09-24 | End: 2019-01-04

## 2018-10-03 ENCOUNTER — TELEPHONE (OUTPATIENT)
Dept: RHEUMATOLOGY | Facility: CLINIC | Age: 63
End: 2018-10-03

## 2018-10-03 NOTE — TELEPHONE ENCOUNTER
----- Message from Don Rivera sent at 10/3/2018 10:35 AM CDT -----  Contact: [phone]  Pt would like to consult with the nurse labs, pt would like lab orders faxed to [facility]. Please fax orders to [facility] to [phone].   Please call back at [phone].  [facility]

## 2018-10-07 DIAGNOSIS — L40.50 PSORIATIC ARTHRITIS: ICD-10-CM

## 2018-10-08 RX ORDER — LEFLUNOMIDE 20 MG/1
TABLET ORAL
Qty: 16 TABLET | Refills: 3 | Status: SHIPPED | OUTPATIENT
Start: 2018-10-08 | End: 2019-02-12 | Stop reason: SDUPTHER

## 2018-10-09 ENCOUNTER — TELEPHONE (OUTPATIENT)
Dept: RHEUMATOLOGY | Facility: CLINIC | Age: 63
End: 2018-10-09

## 2018-10-09 DIAGNOSIS — Z51.81 MEDICATION MONITORING ENCOUNTER: Primary | ICD-10-CM

## 2018-10-09 DIAGNOSIS — L40.50 PSORIATIC ARTHRITIS: ICD-10-CM

## 2018-10-09 NOTE — TELEPHONE ENCOUNTER
----- Message from Elida Gonzales sent at 10/9/2018  8:04 AM CDT -----  Contact: Patient  Patient states that a copy of lab orders were suppose to be faxed to [facility] at [phone] states this is the second request for this to be done, please call patient when faxed at [phone]. Thank you

## 2018-10-11 LAB
ALBUMIN SERPL-MCNC: 4.9 G/DL (ref 3.6–4.8)
ALBUMIN/GLOB SERPL: 1.6 {RATIO} (ref 1.2–2.2)
ALP SERPL-CCNC: 57 IU/L (ref 39–117)
ALT SERPL-CCNC: 20 IU/L (ref 0–32)
AST SERPL-CCNC: 25 IU/L (ref 0–40)
BASOPHILS # BLD AUTO: 0 X10E3/UL (ref 0–0.2)
BASOPHILS NFR BLD AUTO: 1 %
BILIRUB SERPL-MCNC: 0.7 MG/DL (ref 0–1.2)
BUN SERPL-MCNC: 18 MG/DL (ref 8–27)
BUN/CREAT SERPL: 24 (ref 12–28)
CALCIUM SERPL-MCNC: 10.1 MG/DL (ref 8.7–10.3)
CHLORIDE SERPL-SCNC: 100 MMOL/L (ref 96–106)
CO2 SERPL-SCNC: 23 MMOL/L (ref 20–29)
CREAT SERPL-MCNC: 0.74 MG/DL (ref 0.57–1)
CRP SERPL-MCNC: 0.4 MG/L (ref 0–4.9)
EGFR IF AFRICAN AMERICAN: 100 ML/MIN/1.73
EOSINOPHIL # BLD AUTO: 0.3 X10E3/UL (ref 0–0.4)
EOSINOPHIL NFR BLD AUTO: 6 %
ERYTHROCYTE [DISTWIDTH] IN BLOOD BY AUTOMATED COUNT: 14.1 % (ref 12.3–15.4)
ERYTHROCYTE [SEDIMENTATION RATE] IN BLOOD BY WESTERGREN METHOD: 3 MM/HR (ref 0–40)
EST. GFR  (NON AFRICAN AMERICAN): 86 ML/MIN/1.73
GLOBULIN SER CALC-MCNC: 3.1 G/DL (ref 1.5–4.5)
GLUCOSE SERPL-MCNC: 86 MG/DL (ref 65–99)
HCT VFR BLD AUTO: 42.7 % (ref 34–46.6)
HGB BLD-MCNC: 13.9 G/DL (ref 11.1–15.9)
IMM GRANULOCYTES # BLD: 0 X10E3/UL (ref 0–0.1)
IMM GRANULOCYTES NFR BLD: 0 %
LYMPHOCYTES # BLD AUTO: 2.1 X10E3/UL (ref 0.7–3.1)
LYMPHOCYTES NFR BLD AUTO: 40 %
MCH RBC QN AUTO: 30.9 PG (ref 26.6–33)
MCHC RBC AUTO-ENTMCNC: 32.6 G/DL (ref 31.5–35.7)
MCV RBC AUTO: 95 FL (ref 79–97)
MONOCYTES # BLD AUTO: 0.5 X10E3/UL (ref 0.1–0.9)
MONOCYTES NFR BLD AUTO: 10 %
NEUTROPHILS # BLD AUTO: 2.2 X10E3/UL (ref 1.4–7)
NEUTROPHILS NFR BLD AUTO: 43 %
PLATELET # BLD AUTO: 284 X10E3/UL (ref 150–379)
POTASSIUM SERPL-SCNC: 5.3 MMOL/L (ref 3.5–5.2)
PROT SERPL-MCNC: 8 G/DL (ref 6–8.5)
RBC # BLD AUTO: 4.5 X10E6/UL (ref 3.77–5.28)
SODIUM SERPL-SCNC: 143 MMOL/L (ref 134–144)
SPECIMEN STATUS REPORT: NORMAL
WBC # BLD AUTO: 5.2 X10E3/UL (ref 3.4–10.8)

## 2018-10-15 ENCOUNTER — OFFICE VISIT (OUTPATIENT)
Dept: RHEUMATOLOGY | Facility: CLINIC | Age: 63
End: 2018-10-15
Payer: COMMERCIAL

## 2018-10-15 VITALS
DIASTOLIC BLOOD PRESSURE: 81 MMHG | WEIGHT: 153.88 LBS | SYSTOLIC BLOOD PRESSURE: 122 MMHG | BODY MASS INDEX: 24.1 KG/M2 | HEART RATE: 71 BPM

## 2018-10-15 DIAGNOSIS — M54.2 NECK PAIN, CHRONIC: ICD-10-CM

## 2018-10-15 DIAGNOSIS — N95.1 POST MENOPAUSAL SYNDROME: Primary | ICD-10-CM

## 2018-10-15 DIAGNOSIS — L40.50 PSORIATIC ARTHRITIS: ICD-10-CM

## 2018-10-15 DIAGNOSIS — G89.29 CHRONIC MIDLINE LOW BACK PAIN WITHOUT SCIATICA: ICD-10-CM

## 2018-10-15 DIAGNOSIS — L40.9 PSORIASIS: ICD-10-CM

## 2018-10-15 DIAGNOSIS — D84.9 IMMUNOCOMPROMISED: ICD-10-CM

## 2018-10-15 DIAGNOSIS — Z51.81 MEDICATION MONITORING ENCOUNTER: ICD-10-CM

## 2018-10-15 DIAGNOSIS — G89.29 NECK PAIN, CHRONIC: ICD-10-CM

## 2018-10-15 DIAGNOSIS — M54.50 CHRONIC MIDLINE LOW BACK PAIN WITHOUT SCIATICA: ICD-10-CM

## 2018-10-15 PROCEDURE — 99214 OFFICE O/P EST MOD 30 MIN: CPT | Mod: S$GLB,,, | Performed by: INTERNAL MEDICINE

## 2018-10-15 PROCEDURE — 3008F BODY MASS INDEX DOCD: CPT | Mod: CPTII,S$GLB,, | Performed by: INTERNAL MEDICINE

## 2018-10-15 PROCEDURE — 99999 PR PBB SHADOW E&M-EST. PATIENT-LVL III: CPT | Mod: PBBFAC,,, | Performed by: INTERNAL MEDICINE

## 2018-10-15 NOTE — PATIENT INSTRUCTIONS
Maintain Humira    Arava- 1 every other day    Try Zanaflex -1-2 at night for your neck pains    Moist heat and Biofreeze rub if neck tightens up    OK to increase Celebrex to 1 twice day for 3 days if bad flare of neck    Call - can send to PT for neck if stays bad    Vit D3- 800 units day helps bones

## 2018-10-29 NOTE — PROGRESS NOTES
Subjective:       Patient ID: Maricarmen Becerril is a 63 y.o. female.    Chief Complaint: PsA, psoriasis    Maricarmen is here for routine follow up for psoriatic arthritis with psoriasis.  She was last seen in mid June and doing well on Humira and intermittent Arava as well as Celebrex for her psoriatic arthritis.  She in the past has been on mtx but this was discontinued due to development of ILD and lung nodules.  She is currently on Arava 20mg every other day and humira 40mg every other week and doing well.  Her joint pain is 0/10.  She has no acute swollen joints or digits.  She has chronic neck and back pain due to degenerative arthritis, bulging discs (mri l spine in 2013 with disc disease, deg arthritis).  This continues to be her major issue weekly in the mid neck radiating to her head.  She tried gabapentin but it made her too drowsy.  Seven weeks ago she fell and injured her ankle and that is slowly gotten better ;no fracture  She sees pulmonary for her lungs and her last CT was stable.  No psoriasis rash, at this time.  She is postmenopausal at this time is not had a recent DEXA.  She has had no fractures      Psoriasis   Associated symptoms include arthralgias, neck pain and numbness. Pertinent negatives include no abdominal pain, chest pain, chills, coughing, fatigue, fever, headaches, joint swelling, myalgias, nausea, rash, sore throat, vomiting or weakness.     Review of Systems   Constitutional: Negative for chills, fatigue and fever.   HENT: Negative for mouth sores, rhinorrhea and sore throat.    Eyes: Negative for pain and redness.   Respiratory: Negative for cough and shortness of breath.    Cardiovascular: Negative for chest pain.   Gastrointestinal: Negative for abdominal pain, constipation, diarrhea, nausea and vomiting.   Genitourinary: Negative for dysuria and hematuria.   Musculoskeletal: Positive for arthralgias, back pain and neck pain. Negative for joint swelling and myalgias.    Skin: Negative for rash.   Neurological: Positive for numbness. Negative for weakness and headaches.   Psychiatric/Behavioral: Negative for agitation. The patient is not nervous/anxious.          Objective:   /81   Pulse 71   Wt 69.8 kg (153 lb 14.1 oz)   BMI 24.10 kg/m²      Physical Exam   Constitutional: She is oriented to person, place, and time and well-developed, well-nourished, and in no distress.   HENT:   Head: Normocephalic and atraumatic.   Eyes: Pupils are equal, round, and reactive to light. Right eye exhibits no discharge.   Neck: Normal range of motion.   Cardiovascular: Normal rate, regular rhythm and normal heart sounds.  Exam reveals no friction rub.    Pulmonary/Chest: Effort normal and breath sounds normal. No respiratory distress.   Abdominal: Soft. She exhibits no distension. There is no tenderness.   Lymphadenopathy:     She has no cervical adenopathy.   Neurological: She is alert and oriented to person, place, and time.   Skin: No erythema.     No psoriasis rash   Psychiatric: Mood and affect normal.   Musculoskeletal: Normal range of motion. She exhibits no edema or deformity.   liya hands with oa changes to her pip and dip joints, no synovitis, no dactylitis noted.      Full rom to liya wrists, mcps, pips    Full rom liya knees    Neck rotation is mildly limited with tenderness in the paracervical muscles    Lumbar sacral paraspinous muscles also tender with slight decreased flexion-extension       No results found for this or any previous visit (from the past 168 hour(s)).         Results for orders placed or performed in visit on 10/10/18   CBC/Diff Ambigious Default   Result Value Ref Range    WBC 5.2 3.4 - 10.8 x10E3/uL    RBC 4.50 3.77 - 5.28 x10E6/uL    Hemoglobin 13.9 11.1 - 15.9 g/dL    Hematocrit 42.7 34.0 - 46.6 %    MCV 95 79 - 97 fL    MCH 30.9 26.6 - 33.0 pg    MCHC 32.6 31.5 - 35.7 g/dL    RDW 14.1 12.3 - 15.4 %    Platelets 284 150 - 379 x10E3/uL    Neutrophils 43 Not  Estab. %    Lymph% 40 Not Estab. %    Mono% 10 Not Estab. %    Eosinophil% 6 Not Estab. %    Basophil% 1 Not Estab. %    Neutrophils Absolute 2.2 1.4 - 7.0 x10E3/uL    Lymph # 2.1 0.7 - 3.1 x10E3/uL    Mono # 0.5 0.1 - 0.9 x10E3/uL    Eos # 0.3 0.0 - 0.4 x10E3/uL    Baso # 0.0 0.0 - 0.2 x10E3/uL    Immature Granulocytes 0 Not Estab. %    Immature Grans (Abs) 0.0 0.0 - 0.1 x10E3/uL   Comprehensive metabolic panel   Result Value Ref Range    Glucose 86 65 - 99 mg/dL    BUN, Bld 18 8 - 27 mg/dL    Creatinine 0.74 0.57 - 1.00 mg/dL    eGFR if non African American 86 >59 mL/min/1.73    eGFR if African American 100 >59 mL/min/1.73    BUN/Creatinine Ratio 24 12 - 28    Sodium 143 134 - 144 mmol/L    Potassium 5.3 (H) 3.5 - 5.2 mmol/L    Chloride 100 96 - 106 mmol/L    CO2 23 20 - 29 mmol/L    Calcium 10.1 8.7 - 10.3 mg/dL    Total Protein 8.0 6.0 - 8.5 g/dL    Albumin 4.9 (H) 3.6 - 4.8 g/dL    Globulin, Total 3.1 1.5 - 4.5 g/dL    Albumin/Globulin Ratio 1.6 1.2 - 2.2    Total Bilirubin 0.7 0.0 - 1.2 mg/dL    Alkaline Phosphatase 57 39 - 117 IU/L    AST 25 0 - 40 IU/L    ALT 20 0 - 32 IU/L   Sedimentation rate, automated   Result Value Ref Range    Sed Rate 3 0 - 40 mm/hr   C-reactive protein   Result Value Ref Range    CRP 0.4 0.0 - 4.9 mg/L   Specimen Status Report   Result Value Ref Range    Specimen Status Report Comment        Ct chest 6-14-16: Previously demonstrated tiny pulmonary nodules are not identified. There are no new nodules. There are one or 2 tiny nodules at the lateral left lung base. No pulmonary infiltrates or pleural effusions. No abnormality of the airway. No evidence of pathologic hilar or mediastinal lymphadenopathy     Assessment:       1.  Psoriatic arthritis    2. Post menopausal syndrome   3. Psoriasis    4. Neck pain, chronic    5. Chronic midline low back pain without sciatica    6. Immunocompromised    7. Medication monitoring encounter           Plan:   Continue humira 40mg every other week      Arava- 1 every other day    Try Zanaflex -1-2 at night for your neck pains    Moist heat and Biofreeze rub if neck tightens up    OK to increase Celebrex to 1 twice day for 3 days if bad flare of neck    Call - can send to PT for neck if stays bad    Vit D3- 800 units day helps bones      Cont f/u with pulm for ILD    Return in 4 mos with labwork done prior to appt     Prolonged visit: Over35 min spent in patient care and evaluation. Over 20 min of time used in reviewing recent labs and symptoms, discussing diagnostic possibilities, reviewing medication options, explaining side effects of therapies, and coordination of care with  Family Dr   Patient's questions were all addressed and she understands our plans and risks.

## 2018-11-16 DIAGNOSIS — Z12.39 BREAST CANCER SCREENING: ICD-10-CM

## 2019-01-03 ENCOUNTER — TELEPHONE (OUTPATIENT)
Dept: RHEUMATOLOGY | Facility: CLINIC | Age: 64
End: 2019-01-03

## 2019-01-03 NOTE — TELEPHONE ENCOUNTER
Spoke with [facility] and would like to know if a RX for Humira CF could be sent in. Please Advise.

## 2019-01-03 NOTE — TELEPHONE ENCOUNTER
----- Message from Gilson Guardado sent at 1/3/2019  3:47 PM CST -----  Contact: Fina East  She is calling in regards to getting the pt's medication switched to Humira CS,please advise  [phone]

## 2019-02-12 ENCOUNTER — OFFICE VISIT (OUTPATIENT)
Dept: RHEUMATOLOGY | Facility: CLINIC | Age: 64
End: 2019-02-12
Payer: COMMERCIAL

## 2019-02-12 ENCOUNTER — APPOINTMENT (OUTPATIENT)
Dept: RADIOLOGY | Facility: HOSPITAL | Age: 64
End: 2019-02-12
Attending: INTERNAL MEDICINE
Payer: COMMERCIAL

## 2019-02-12 VITALS
DIASTOLIC BLOOD PRESSURE: 87 MMHG | SYSTOLIC BLOOD PRESSURE: 127 MMHG | HEART RATE: 66 BPM | BODY MASS INDEX: 23.81 KG/M2 | HEIGHT: 67 IN | WEIGHT: 151.69 LBS

## 2019-02-12 DIAGNOSIS — L40.9 PSORIASIS: ICD-10-CM

## 2019-02-12 DIAGNOSIS — E55.9 VITAMIN D DEFICIENCY: ICD-10-CM

## 2019-02-12 DIAGNOSIS — L08.9 SKIN INFECTION: ICD-10-CM

## 2019-02-12 DIAGNOSIS — L40.50 PSORIATIC ARTHRITIS: Primary | ICD-10-CM

## 2019-02-12 DIAGNOSIS — M85.852 OSTEOPENIA OF LEFT HIP: ICD-10-CM

## 2019-02-12 DIAGNOSIS — D84.9 IMMUNOCOMPROMISED: ICD-10-CM

## 2019-02-12 DIAGNOSIS — M94.9 DISORDER OF BONE AND ARTICULAR CARTILAGE: ICD-10-CM

## 2019-02-12 DIAGNOSIS — N95.1 POST MENOPAUSAL SYNDROME: ICD-10-CM

## 2019-02-12 DIAGNOSIS — M89.9 DISORDER OF BONE AND ARTICULAR CARTILAGE: ICD-10-CM

## 2019-02-12 DIAGNOSIS — Z51.81 MEDICATION MONITORING ENCOUNTER: ICD-10-CM

## 2019-02-12 PROCEDURE — 77080 DEXA BONE DENSITY SPINE HIP: ICD-10-PCS | Mod: 26,,, | Performed by: RADIOLOGY

## 2019-02-12 PROCEDURE — 3008F PR BODY MASS INDEX (BMI) DOCUMENTED: ICD-10-PCS | Mod: CPTII,S$GLB,, | Performed by: PHYSICIAN ASSISTANT

## 2019-02-12 PROCEDURE — 99214 PR OFFICE/OUTPT VISIT, EST, LEVL IV, 30-39 MIN: ICD-10-PCS | Mod: S$GLB,,, | Performed by: PHYSICIAN ASSISTANT

## 2019-02-12 PROCEDURE — 99214 OFFICE O/P EST MOD 30 MIN: CPT | Mod: S$GLB,,, | Performed by: PHYSICIAN ASSISTANT

## 2019-02-12 PROCEDURE — 77080 DXA BONE DENSITY AXIAL: CPT | Mod: TC

## 2019-02-12 PROCEDURE — 3008F BODY MASS INDEX DOCD: CPT | Mod: CPTII,S$GLB,, | Performed by: PHYSICIAN ASSISTANT

## 2019-02-12 PROCEDURE — 99999 PR PBB SHADOW E&M-EST. PATIENT-LVL IV: CPT | Mod: PBBFAC,,, | Performed by: PHYSICIAN ASSISTANT

## 2019-02-12 PROCEDURE — 99999 PR PBB SHADOW E&M-EST. PATIENT-LVL IV: ICD-10-PCS | Mod: PBBFAC,,, | Performed by: PHYSICIAN ASSISTANT

## 2019-02-12 PROCEDURE — 77080 DXA BONE DENSITY AXIAL: CPT | Mod: 26,,, | Performed by: RADIOLOGY

## 2019-02-12 RX ORDER — MUPIROCIN 20 MG/G
OINTMENT TOPICAL 3 TIMES DAILY
Qty: 30 G | Refills: 3 | Status: SHIPPED | OUTPATIENT
Start: 2019-02-12 | End: 2019-04-16

## 2019-02-12 ASSESSMENT — ROUTINE ASSESSMENT OF PATIENT INDEX DATA (RAPID3): MDHAQ FUNCTION SCORE: 0

## 2019-02-12 NOTE — PROGRESS NOTES
Subjective:       Patient ID: Maricarmen Becerril is a 63 y.o. female.    Chief Complaint: PsA, psoriasis    Maricarmen is here for routine follow up for psoriatic arthritis with psoriasis.      She in the past has been on mtx but this was discontinued due to development of ILD and lung nodules.  She is currently on Arava 20mg every other day and humira 40mg every other week and doing well.  Her joint pain is 0/10.  Has tried to cut back on arava but had increased pain/stiffness so she resumed. Recently no acute swollen joints or digits. No heel pain, elbow pain, knee pain etc.   She has chronic neck and back pain due to degenerative arthritis, bulging discs (mri l spine in 2013 with disc disease, deg arthritis). Celebrex daily helps but still c/o numbness and burning in her feet at night.  Off gabapentin, bipin her sleepy.  She sees pulmonary for her lungs and her last CT was stable.  No psoriasis rash, had rash on her legs last two visits but this has improved.   Pain today 0/10 skin rash <1%    DEXA done today- no prior dexa on file (looked back to legacy documents)- pt denies ever having one done, not on supplement Ca or vit D, no fx hx              She reports no joint swelling. Pertinent negatives include no dysuria, fatigue, fever, myalgias or headaches.               She reports no joint swelling. Pertinent negatives include no dysuria, fatigue, fever, myalgias or headaches.         Review of Systems   Constitutional: Negative for chills, fatigue and fever.   HENT: Negative for mouth sores, rhinorrhea and sore throat.    Eyes: Negative for pain and redness.   Respiratory: Negative for cough and shortness of breath.    Cardiovascular: Negative for chest pain.   Gastrointestinal: Negative for abdominal pain, constipation, diarrhea, nausea and vomiting.   Genitourinary: Negative for dysuria and hematuria.   Musculoskeletal: Negative for arthralgias, back pain, joint swelling, myalgias and neck pain.   Skin:  "Positive for rash.   Neurological: Negative for weakness, numbness and headaches.   Psychiatric/Behavioral: The patient is not nervous/anxious.          Objective:   /87   Pulse 66   Ht 5' 7" (1.702 m)   Wt 68.8 kg (151 lb 10.8 oz)   BMI 23.76 kg/m²      Physical Exam   Constitutional: She is oriented to person, place, and time and well-developed, well-nourished, and in no distress.   HENT:   Head: Normocephalic and atraumatic.   Eyes: Pupils are equal, round, and reactive to light. Right eye exhibits no discharge.   Neck: Normal range of motion.   Cardiovascular: Normal rate, regular rhythm and normal heart sounds.  Exam reveals no friction rub.    Pulmonary/Chest: Effort normal and breath sounds normal. No respiratory distress.   Abdominal: Soft. She exhibits no distension. There is no tenderness.   Lymphadenopathy:     She has no cervical adenopathy.   Neurological: She is alert and oriented to person, place, and time.   Skin: No erythema.     No psoriasis rash   Psychiatric: Mood normal.   Musculoskeletal: Normal range of motion. She exhibits no edema or deformity.   liya hands with oa changes to her pip and dip joints, no synovitis, no dactylitis noted.      Full rom to liya wrists, mcps, pips    Full rom liya knees              Labwork done at [facility] on [facility]riuplex, not done yet- sent to lab post visit   Addendum: labs received- cbc and cmp wnl, vit D very low at 8.4, esr and crp wnl     Ct chest 6-14-16: Previously demonstrated tiny pulmonary nodules are not identified. There are no new nodules. There are one or 2 tiny nodules at the lateral left lung base. No pulmonary infiltrates or pleural effusions. No abnormality of the airway. No evidence of pathologic hilar or mediastinal lymphadenopathy     Assessment:       1. Psoriatic arthritis    2. Psoriasis    3. Medication monitoring encounter    4. Immunocompromised    5. Skin infection    6. Osteopenia of left hip    7. Disorder of bone and " articular cartilage        Impression:    Psoriatic arthritis with h/o psoriasis:  Well controlled on humira 40mg every other week and arava 20mg every other day, celebrex 200mg/d; joints doing well, cdai 0, mhaq 0.6; failed mtx due to development of ILD/lung nodules;     Medication monitoring: no signs of toxicity, [facility]    Immunocompromised:  utd except zoster/shingrix, she is declining the vaccine at this time    Lung disease/lung nodules: thought due to mtx, pulm following, this is stable    DJD spine, neck back: ok today, with radicular symptoms liya legs/neuropathy, stable, celebrex helps; off gabapentin made her drowsy, takes tumeric daily     Osteopenia reviewd today; normal bmd at spine L1-4, Osteopenia at hip both left femur neck and total femur mean  Lowest T score at left FN -2.3  Mod rsk fx: 11.7% christy, 2.2% hip    Vit D def- level 8    Plan:       Continue humira 40mg every other week and c/w arava every other day     Osteopenia with mod risk fx  Check Ca and  vit D with labs post visit, supplement if indicated  Addendum: add vit D 50K Q week and recheck vit D in 3-4 months   Repeat dexa in 2 years (2/2020); treat if indicated then but for now no need to treat      gabapentin at night for her neuropathy symptoms    Go get labwork done at [facility] this week (cbc, cmp, esr, crp and vit d) and fax results to us  Cont f/u with pulm for ILD    Return in 4 mos with labwork done prior to appt at [facility] - she will see greg back next visit     Please call or send portal message with any questions or concerns

## 2019-02-15 LAB
25(OH)D3+25(OH)D2 SERPL-MCNC: 8.4 NG/ML (ref 30–100)
ALBUMIN SERPL-MCNC: 4.3 G/DL (ref 3.6–4.8)
ALBUMIN/GLOB SERPL: 1.4 {RATIO} (ref 1.2–2.2)
ALP SERPL-CCNC: 57 IU/L (ref 39–117)
ALT SERPL-CCNC: 22 IU/L (ref 0–32)
AST SERPL-CCNC: 25 IU/L (ref 0–40)
BASOPHILS # BLD AUTO: 0 X10E3/UL (ref 0–0.2)
BASOPHILS NFR BLD AUTO: 1 %
BILIRUB SERPL-MCNC: 0.8 MG/DL (ref 0–1.2)
BUN SERPL-MCNC: 15 MG/DL (ref 8–27)
BUN/CREAT SERPL: 26 (ref 12–28)
CALCIUM SERPL-MCNC: 10 MG/DL (ref 8.7–10.3)
CHLORIDE SERPL-SCNC: 100 MMOL/L (ref 96–106)
CO2 SERPL-SCNC: 24 MMOL/L (ref 20–29)
CREAT SERPL-MCNC: 0.57 MG/DL (ref 0.57–1)
CRP SERPL-MCNC: 0.2 MG/L (ref 0–4.9)
EOSINOPHIL # BLD AUTO: 0.3 X10E3/UL (ref 0–0.4)
EOSINOPHIL NFR BLD AUTO: 8 %
ERYTHROCYTE [DISTWIDTH] IN BLOOD BY AUTOMATED COUNT: 13.9 % (ref 12.3–15.4)
ERYTHROCYTE [SEDIMENTATION RATE] IN BLOOD BY WESTERGREN METHOD: 4 MM/HR (ref 0–40)
GLOBULIN SER CALC-MCNC: 3 G/DL (ref 1.5–4.5)
GLUCOSE SERPL-MCNC: 83 MG/DL (ref 65–99)
HCT VFR BLD AUTO: 41.3 % (ref 34–46.6)
HGB BLD-MCNC: 13.5 G/DL (ref 11.1–15.9)
IMM GRANULOCYTES # BLD AUTO: 0 X10E3/UL (ref 0–0.1)
IMM GRANULOCYTES NFR BLD AUTO: 0 %
LYMPHOCYTES # BLD AUTO: 1.5 X10E3/UL (ref 0.7–3.1)
LYMPHOCYTES NFR BLD AUTO: 35 %
MCH RBC QN AUTO: 30.1 PG (ref 26.6–33)
MCHC RBC AUTO-ENTMCNC: 32.7 G/DL (ref 31.5–35.7)
MCV RBC AUTO: 92 FL (ref 79–97)
MONOCYTES # BLD AUTO: 0.4 X10E3/UL (ref 0.1–0.9)
MONOCYTES NFR BLD AUTO: 10 %
NEUTROPHILS # BLD AUTO: 2 X10E3/UL (ref 1.4–7)
NEUTROPHILS NFR BLD AUTO: 46 %
PLATELET # BLD AUTO: 250 X10E3/UL (ref 150–379)
POTASSIUM SERPL-SCNC: 4.9 MMOL/L (ref 3.5–5.2)
PROT SERPL-MCNC: 7.3 G/DL (ref 6–8.5)
RBC # BLD AUTO: 4.49 X10E6/UL (ref 3.77–5.28)
SODIUM SERPL-SCNC: 138 MMOL/L (ref 134–144)
SPECIMEN STATUS REPORT: NORMAL
WBC # BLD AUTO: 4.3 X10E3/UL (ref 3.4–10.8)

## 2019-02-21 RX ORDER — ERGOCALCIFEROL 1.25 MG/1
50000 CAPSULE ORAL
Qty: 12 CAPSULE | Refills: 3 | Status: SHIPPED | OUTPATIENT
Start: 2019-02-21 | End: 2019-03-23

## 2019-03-26 ENCOUNTER — TELEPHONE (OUTPATIENT)
Dept: RHEUMATOLOGY | Facility: CLINIC | Age: 64
End: 2019-03-26

## 2019-03-26 NOTE — TELEPHONE ENCOUNTER
----- Message from Sahara Gunderson sent at 3/26/2019  1:31 PM CDT -----  Contact: [phone]  Would like to consult with the nurse, patients has question concern her medication that she is taking and would like to speak with the nurse  about this ,please call back at [phone], thanks sj

## 2019-03-26 NOTE — TELEPHONE ENCOUNTER
Pt states she has consistently had diarrhea since starting Vit D about 3wks ago. Would like to know if this is a normal, or should she completley stop it. Please advise.

## 2019-03-28 ENCOUNTER — TELEPHONE (OUTPATIENT)
Dept: RHEUMATOLOGY | Facility: CLINIC | Age: 64
End: 2019-03-28

## 2019-03-28 NOTE — TELEPHONE ENCOUNTER
Not usually  Is she having any nausea or vomiting or only diarrhea?  Any other new meds recently?   Any antibiotic meds recently ?    She is on leflunomide (arava) and diarrhea is a common issues which can occur  I would have her hold the  vit D for 1 week and see if diarrhea resolves Then can resume the following week  I would also hold the leflunomide for 1-2 weeks and add back just on mon/wed/fri

## 2019-03-28 NOTE — TELEPHONE ENCOUNTER
Pt experiencing diarrhea since starting on Vit D about 3wks ago. Wants to know if the Vit D  can be the cause, would like to be advised.

## 2019-03-28 NOTE — TELEPHONE ENCOUNTER
Called pt back to advise of Renay's note, pt states not having any nausea/vomitting. Verbalized understanding to hold Vit D for 1wk to see if symptoms resolve.

## 2019-04-02 ENCOUNTER — PATIENT OUTREACH (OUTPATIENT)
Dept: ADMINISTRATIVE | Facility: HOSPITAL | Age: 64
End: 2019-04-02

## 2019-04-02 NOTE — PROGRESS NOTES
Immunizations reconciled with LINKS and/or CareEverywhere. PREVISIT CHART AUDIT LETTER SENT VIA PATIENT PORTAL

## 2019-04-08 ENCOUNTER — TELEPHONE (OUTPATIENT)
Dept: INTERNAL MEDICINE | Facility: CLINIC | Age: 64
End: 2019-04-08

## 2019-04-08 DIAGNOSIS — Z12.39 BREAST CANCER SCREENING: Primary | ICD-10-CM

## 2019-04-08 NOTE — TELEPHONE ENCOUNTER
----- Message from Lliiane Moody sent at 4/8/2019  2:03 PM CDT -----  Patient would like lab orders to be sent to [facility]...[phone]

## 2019-04-08 NOTE — TELEPHONE ENCOUNTER
----- Message from Rox Lee sent at 4/8/2019  2:48 PM CDT -----  .Type:  Mammogram    Caller is requesting to schedule their annual mammogram appointment.  Order is not listed in EPIC.  Please enter order and contact patient to schedule.  Name of Caller:pt  Where would they like the mammogram performed?[facility], scheduled 4/19  Would the patient rather a call back or a response via [facility]? call  Best Call Back Number:.[phone] (home)   Additional Information:

## 2019-04-09 ENCOUNTER — TELEPHONE (OUTPATIENT)
Dept: RHEUMATOLOGY | Facility: CLINIC | Age: 64
End: 2019-04-09

## 2019-04-16 ENCOUNTER — OFFICE VISIT (OUTPATIENT)
Dept: INTERNAL MEDICINE | Facility: CLINIC | Age: 64
End: 2019-04-16
Payer: COMMERCIAL

## 2019-04-16 VITALS
SYSTOLIC BLOOD PRESSURE: 136 MMHG | HEART RATE: 71 BPM | WEIGHT: 150.38 LBS | DIASTOLIC BLOOD PRESSURE: 80 MMHG | OXYGEN SATURATION: 95 % | HEIGHT: 67 IN | BODY MASS INDEX: 23.6 KG/M2 | TEMPERATURE: 98 F

## 2019-04-16 DIAGNOSIS — Z12.11 SCREEN FOR COLON CANCER: Primary | ICD-10-CM

## 2019-04-16 PROCEDURE — 99396 PREV VISIT EST AGE 40-64: CPT | Mod: S$GLB,,, | Performed by: FAMILY MEDICINE

## 2019-04-16 PROCEDURE — 99999 PR PBB SHADOW E&M-EST. PATIENT-LVL III: ICD-10-PCS | Mod: PBBFAC,,, | Performed by: FAMILY MEDICINE

## 2019-04-16 PROCEDURE — 99999 PR PBB SHADOW E&M-EST. PATIENT-LVL III: CPT | Mod: PBBFAC,,, | Performed by: FAMILY MEDICINE

## 2019-04-16 PROCEDURE — 99396 PR PREVENTIVE VISIT,EST,40-64: ICD-10-PCS | Mod: S$GLB,,, | Performed by: FAMILY MEDICINE

## 2019-04-16 NOTE — PROGRESS NOTES
Subjective:       Patient ID: Maricarmen Becerril is a . female.    Chief Complaint: here for physical examination and issues  below    HPI Hypothyroidism: hx  [facility] tshchol but prob getting our orders to them. Written rx today she will addto June rheum lab. Tolerating med. asympt    psor arth utd rheum    prev d/wd 2011 mri show liver lesions most likely hemangiomas w recomm fede; d/w this and reason for but she declined    intersit lungdx/nodules pulm  due 7/17 but ct chest and pfts ok and no change sympt so she defers f/u pulm      Review of Systems  Cardiovascular: no chest pain    Abd: no abd pain  Remainder review of systems negative    Objective:      Physical Exam   Constitutional: She is oriented to person, place, and time. She appears well-developed and well-nourished. No distress.   HENT:   Head: Atraumatic.   Right Ear: External ear normal.   Left Ear: External ear normal.   Nose: Nose normal.x nasal jimy   Mouth/Throat: Oropharynx is clear and moist. No oropharyngeal exudate.   bilat tms nl   Eyes: Conjunctivae and EOM are normal. Pupils are equal, round, and reactive to light. No scleral icterus.   Neck: Normal range of motion. Neck supple. No thyromegaly present.   Cardiovascular: Normal rate, regular rhythm and normal heart sounds.    No murmur heard.  Pulmonary/Chest: Effort normal and breath sounds normal. No respiratory distress. She has no wheezes. She has no rales.   Abdominal: Soft. Bowel sounds are normal. She exhibits no distension and no mass. There is no hepatosplenomegaly. There is no tenderness. There is no rebound and no guarding.   Musculoskeletal: Normal range of motion. She exhibits no edema or tenderness.   Lymphadenopathy:     She has no cervical adenopathy.   Neurological: She is alert and oriented to person, place, and time. No cranial nerve deficit. She exhibits normal muscle tone. Coordination normal.   Skin: Skin is warm. No rash noted. No erythema. No pallor.    Psychiatric: She has a normal mood and affect. Her behavior is normal. Judgment and thought content normal.   Nursing note and vitals reviewed.      Assessment:       1. Routine health maintenance    2. Hypothyroidism, unspecified hypothyroidism type          Psori. Arthritis      Plan:     F/u one yr:sched tsh chol then  w lab [facility] rx today for june  *    F/u rheum when due  .**  Add:note prev d/wd 2011 mri show liver lesions most likely hemangiomas w recomm fede; d/w this and reason for but she declined    She will call dr larson for pap. > 5 yrs  Screen for colon cancer  -     Case request GI: COLONOSCOPY  -     Fecal Immunochemical Test (iFOBT); Future; Expected date: 04/16/2019    Shingrix new shingles vaccine  via a pharmacy  Tetanus/whooping cough vaccine ;declines today  mammo sched womans soon. saji signed

## 2019-04-30 RX ORDER — LEVOTHYROXINE SODIUM 75 UG/1
TABLET ORAL
Qty: 90 TABLET | Refills: 0 | Status: SHIPPED | OUTPATIENT
Start: 2019-04-30 | End: 2019-08-13

## 2019-05-01 DIAGNOSIS — L40.50 PSORIATIC ARTHRITIS: ICD-10-CM

## 2019-05-01 RX ORDER — CELECOXIB 200 MG/1
CAPSULE ORAL
Qty: 90 CAPSULE | Refills: 1 | Status: SHIPPED | OUTPATIENT
Start: 2019-05-01 | End: 2019-08-13

## 2019-05-02 ENCOUNTER — TELEPHONE (OUTPATIENT)
Dept: ENDOSCOPY | Facility: HOSPITAL | Age: 64
End: 2019-05-02

## 2019-05-02 NOTE — TELEPHONE ENCOUNTER
Patient returning voice message. Patient state she will call back to schedule, call back number provided.

## 2019-05-08 RX ORDER — ADALIMUMAB 40MG/0.4ML
KIT SUBCUTANEOUS
Qty: 2 PEN | Refills: 4 | Status: SHIPPED | OUTPATIENT
Start: 2019-05-08 | End: 2019-08-13 | Stop reason: SDUPTHER

## 2019-05-13 DIAGNOSIS — L40.50 PSORIATIC ARTHRITIS: ICD-10-CM

## 2019-05-13 RX ORDER — LEFLUNOMIDE 20 MG/1
20 TABLET ORAL EVERY OTHER DAY
Qty: 16 TABLET | Refills: 5 | Status: SHIPPED | OUTPATIENT
Start: 2019-05-13 | End: 2019-08-13

## 2019-05-29 ENCOUNTER — TELEPHONE (OUTPATIENT)
Dept: ADMINISTRATIVE | Facility: HOSPITAL | Age: 64
End: 2019-05-29

## 2019-05-30 ENCOUNTER — PATIENT OUTREACH (OUTPATIENT)
Dept: ADMINISTRATIVE | Facility: HOSPITAL | Age: 64
End: 2019-05-30

## 2019-06-11 LAB
ALBUMIN SERPL-MCNC: 4.7 G/DL (ref 3.6–4.8)
ALBUMIN/GLOB SERPL: 1.5 {RATIO} (ref 1.2–2.2)
ALP SERPL-CCNC: 52 IU/L (ref 39–117)
ALT SERPL-CCNC: 27 IU/L (ref 0–32)
AST SERPL-CCNC: 28 IU/L (ref 0–40)
BASOPHILS # BLD AUTO: 0 X10E3/UL (ref 0–0.2)
BASOPHILS NFR BLD AUTO: 1 %
BILIRUB SERPL-MCNC: 0.6 MG/DL (ref 0–1.2)
BUN SERPL-MCNC: 22 MG/DL (ref 8–27)
BUN/CREAT SERPL: 33 (ref 12–28)
CALCIUM SERPL-MCNC: 10.8 MG/DL (ref 8.7–10.3)
CHLORIDE SERPL-SCNC: 97 MMOL/L (ref 96–106)
CO2 SERPL-SCNC: 27 MMOL/L (ref 20–29)
CREAT SERPL-MCNC: 0.67 MG/DL (ref 0.57–1)
CRP SERPL-MCNC: 0.4 MG/L (ref 0–4.9)
EOSINOPHIL # BLD AUTO: 0.3 X10E3/UL (ref 0–0.4)
EOSINOPHIL NFR BLD AUTO: 5 %
ERYTHROCYTE [DISTWIDTH] IN BLOOD BY AUTOMATED COUNT: 13.7 % (ref 12.3–15.4)
ERYTHROCYTE [SEDIMENTATION RATE] IN BLOOD BY WESTERGREN METHOD: 2 MM/HR (ref 0–40)
GLOBULIN SER CALC-MCNC: 3.1 G/DL (ref 1.5–4.5)
GLUCOSE SERPL-MCNC: 87 MG/DL (ref 65–99)
HCT VFR BLD AUTO: 42.5 % (ref 34–46.6)
HGB BLD-MCNC: 13.9 G/DL (ref 11.1–15.9)
IMM GRANULOCYTES # BLD AUTO: 0 X10E3/UL (ref 0–0.1)
IMM GRANULOCYTES NFR BLD AUTO: 0 %
LYMPHOCYTES # BLD AUTO: 1.8 X10E3/UL (ref 0.7–3.1)
LYMPHOCYTES NFR BLD AUTO: 32 %
MCH RBC QN AUTO: 30.1 PG (ref 26.6–33)
MCHC RBC AUTO-ENTMCNC: 32.7 G/DL (ref 31.5–35.7)
MCV RBC AUTO: 92 FL (ref 79–97)
MONOCYTES # BLD AUTO: 0.5 X10E3/UL (ref 0.1–0.9)
MONOCYTES NFR BLD AUTO: 9 %
NEUTROPHILS # BLD AUTO: 3 X10E3/UL (ref 1.4–7)
NEUTROPHILS NFR BLD AUTO: 53 %
PLATELET # BLD AUTO: 264 X10E3/UL (ref 150–450)
POTASSIUM SERPL-SCNC: 4.3 MMOL/L (ref 3.5–5.2)
PROT SERPL-MCNC: 7.8 G/DL (ref 6–8.5)
RBC # BLD AUTO: 4.62 X10E6/UL (ref 3.77–5.28)
SODIUM SERPL-SCNC: 140 MMOL/L (ref 134–144)
SPECIMEN STATUS REPORT: NORMAL
WBC # BLD AUTO: 5.7 X10E3/UL (ref 3.4–10.8)

## 2019-08-12 NOTE — TELEPHONE ENCOUNTER
----- Message from Zeferino Gee sent at 8/12/2019  2:03 PM CDT -----  Contact: self  Type:  Needs Medical Advice    Who Called: pt  Symptoms (please be specific): n/a   How long has patient had these symptoms: n/a  Pharmacy name and phone #:   [facility] - [place], [place] - [address]  [address]  [place] [address]  Phone: [phone] Fax: [phone]  Would the patient rather a call back or a response via [facility]? Call back  Best Call Back Number: [phone]  Additional Information: requesting call back regarding getting status of pt medication being sent to pharmacy. Pt states its been over two weeks and she has not gotten an answer.    Thanks,  Zeferino Gee

## 2019-08-13 ENCOUNTER — OFFICE VISIT (OUTPATIENT)
Dept: RHEUMATOLOGY | Facility: CLINIC | Age: 64
End: 2019-08-13
Payer: COMMERCIAL

## 2019-08-13 VITALS
DIASTOLIC BLOOD PRESSURE: 92 MMHG | SYSTOLIC BLOOD PRESSURE: 126 MMHG | HEART RATE: 61 BPM | WEIGHT: 150.13 LBS | BODY MASS INDEX: 23.51 KG/M2

## 2019-08-13 DIAGNOSIS — L40.9 PSORIASIS: Primary | ICD-10-CM

## 2019-08-13 DIAGNOSIS — J30.89 NON-SEASONAL ALLERGIC RHINITIS DUE TO OTHER ALLERGIC TRIGGER: Chronic | ICD-10-CM

## 2019-08-13 DIAGNOSIS — L40.50 PSORIATIC ARTHRITIS: ICD-10-CM

## 2019-08-13 DIAGNOSIS — M47.817 LUMBAR AND SACRAL OSTEOARTHRITIS: ICD-10-CM

## 2019-08-13 DIAGNOSIS — Z51.81 MEDICATION MONITORING ENCOUNTER: ICD-10-CM

## 2019-08-13 PROBLEM — J30.9 ALLERGIC RHINITIS: Chronic | Status: ACTIVE | Noted: 2019-08-13

## 2019-08-13 PROCEDURE — 3008F PR BODY MASS INDEX (BMI) DOCUMENTED: ICD-10-PCS | Mod: CPTII,S$GLB,, | Performed by: INTERNAL MEDICINE

## 2019-08-13 PROCEDURE — 99999 PR PBB SHADOW E&M-EST. PATIENT-LVL III: CPT | Mod: PBBFAC,,, | Performed by: INTERNAL MEDICINE

## 2019-08-13 PROCEDURE — 99215 OFFICE O/P EST HI 40 MIN: CPT | Mod: S$GLB,,, | Performed by: INTERNAL MEDICINE

## 2019-08-13 PROCEDURE — 99999 PR PBB SHADOW E&M-EST. PATIENT-LVL III: ICD-10-PCS | Mod: PBBFAC,,, | Performed by: INTERNAL MEDICINE

## 2019-08-13 PROCEDURE — 99215 PR OFFICE/OUTPT VISIT, EST, LEVL V, 40-54 MIN: ICD-10-PCS | Mod: S$GLB,,, | Performed by: INTERNAL MEDICINE

## 2019-08-13 PROCEDURE — 3008F BODY MASS INDEX DOCD: CPT | Mod: CPTII,S$GLB,, | Performed by: INTERNAL MEDICINE

## 2019-08-13 RX ORDER — CELECOXIB 200 MG/1
CAPSULE ORAL
Qty: 90 CAPSULE | Refills: 1 | Status: SHIPPED | OUTPATIENT
Start: 2019-08-13 | End: 2019-10-03 | Stop reason: SDUPTHER

## 2019-08-13 RX ORDER — LEVOTHYROXINE SODIUM 75 UG/1
TABLET ORAL
Qty: 90 TABLET | Refills: 1 | Status: SHIPPED | OUTPATIENT
Start: 2019-08-13 | End: 2020-03-10 | Stop reason: SDUPTHER

## 2019-08-13 RX ORDER — LEFLUNOMIDE 20 MG/1
TABLET ORAL
Qty: 16 TABLET | Refills: 5 | Status: SHIPPED | OUTPATIENT
Start: 2019-08-13 | End: 2020-06-29 | Stop reason: SDUPTHER

## 2019-08-13 NOTE — PATIENT INSTRUCTIONS
Add Vit d3- 1000 units day    No change in Humira    Do not take Humira if having fever, have an infection,  on an antibiotic, or having surgery in next week. Stay off 1-2 weeks until infection gone or healed from surgery. Call us if any question    Get new vaccine - Shingrix    Lower arava to mon, wed, fri    Change Celebrex from every day to prn daily if needed    Lab 1 week pre visit a [facility]

## 2019-08-13 NOTE — PROGRESS NOTES
Subjective:       Patient ID: Maricarmen Becerril is a 64 y.o. female.    Chief Complaint: PsA, psoriasis    Maricarmen is followed with psoriasis and psoriatic arthritis.  She was last seen in February and doing well and she remained on Humira 40 mg every other week.  She is very happy with the new formulation as it does not hurt all to give her self the shot.  She has continued on Arava every other day and Celebrex is daily.  Celebrex typically helps her back pain which is present regularly and occasion with radicular component.  She does exercise some.  She has had no flare of the psoriasis with just occasional small patches on the arms.  She has had no fever chills sweats or infections.  She denies any other reactions to Humira.  She does have allergic sinus symptoms and we discussed those.  Do not think there being caused by the Humira.    Pertinent past history-has been on mtx but this was discontinued due to development of ILD and lung nodules.  The nodules have been stable on follow-up  She is currently on Arava 20mg every other day and humira 40mg every other week and doing well.  Her joint pain is 0/10.  Has tried to cut back on arava but had increased pain/stiffness so she resumed. Recently no acute swollen joints or digits. No heel pain, elbow pain, knee pain etc.   She has chronic neck and back pain due to degenerative arthritis, bulging discs (mri l spine in 2013 with disc disease, deg arthritis). Celebrex daily helps but still c/o numbness and burning in her feet at night.  Off gabapentin, bipin her sleepy.  She sees pulmonary for her lungs and her last CT was stable.  No psoriasis rash, had rash on her legs last two visits but this has improved.       DEXA last visit showed osteopenia with moderate fracture risk.  She had a low vitamin-D level.  She was given vitamin-D 32068 units a week but it caused her to have upset stomach and she did feel good and that was stopped.  She is not taking any D  presently.  She has not fallen or any fractures.  She does exercise some.            She reports no joint swelling. Pertinent negatives include no dysuria, fatigue, fever, myalgias or headaches.               She reports no joint swelling. Pertinent negatives include no dysuria, fatigue, fever, myalgias or headaches.       Psoriasis   Associated symptoms include neck pain and a rash. Pertinent negatives include no abdominal pain, arthralgias, chest pain, chills, coughing, fatigue, fever, headaches, joint swelling, myalgias, nausea, numbness, sore throat, vomiting or weakness.     Review of Systems   Constitutional: Negative for chills, fatigue and fever.   HENT: Negative for mouth sores, rhinorrhea and sore throat.    Eyes: Negative for pain and redness.   Respiratory: Negative for cough and shortness of breath.    Cardiovascular: Negative for chest pain.   Gastrointestinal: Negative for abdominal pain, constipation, diarrhea, nausea and vomiting.   Genitourinary: Negative for dysuria and hematuria.   Musculoskeletal: Positive for back pain and neck pain. Negative for arthralgias, joint swelling and myalgias.   Skin: Positive for rash.        Minimal psoriasis today   Allergic/Immunologic: Positive for immunocompromised state.   Neurological: Negative for weakness, numbness and headaches.   Psychiatric/Behavioral: Negative for suicidal ideas. The patient is not nervous/anxious.        Past Medical History:   Diagnosis Date    Acid reflux     Allergic rhinitis     Allergy     Arthritis     Dry mouth     Heart murmur     Hypothyroidism     SECONDARY    Lung disease     mtx related    Positive SANA (antinuclear antibody)     Psoriatic arthritis      Past Surgical History:   Procedure Laterality Date    DILATION AND CURETTAGE OF UTERUS      MANDIBLE FRACTURE SURGERY      OTHER SURGICAL HISTORY      uterine suspension    THYROIDECTOMY      TONSILLECTOMY       Family History   Problem Relation Age of Onset     Parkinsonism Mother     Cataracts Mother     Gout Father     Hypothyroidism Father     Cataracts Father     Hypertension Father      Social History     Socioeconomic History    Marital status:      Spouse name: BOLIVAR    Number of children: Not on file    Years of education: Not on file    Highest education level: Not on file   Occupational History     Employer: SELF-EMPLOYED   Social Needs    Financial resource strain: Not on file    Food insecurity:     Worry: Not on file     Inability: Not on file    Transportation needs:     Medical: Not on file     Non-medical: Not on file   Tobacco Use    Smoking status: Never Smoker    Smokeless tobacco: Never Used   Substance and Sexual Activity    Alcohol use: No    Drug use: No    Sexual activity: Not on file   Lifestyle    Physical activity:     Days per week: Not on file     Minutes per session: Not on file    Stress: Not on file   Relationships    Social connections:     Talks on phone: Not on file     Gets together: Not on file     Attends [employer] service: Not on file     Active member of club or organization: Not on file     Attends meetings of clubs or organizations: Not on file     Relationship status: Not on file   Other Topics Concern    Not on file   Social History Narrative    Not on file     Review of patient's allergies indicates:   Allergen Reactions    Codeine      Other reaction(s): Unknown    Methotrexate      Other reaction(s): mtx lung dz    Tramadol      Other reaction(s): nausea     Objective:   BP (!) 126/92   Pulse 61   Wt 68.1 kg (150 lb 2.1 oz)   BMI 23.51 kg/m²      Physical Exam   Constitutional: She is oriented to person, place, and time and well-developed, well-nourished, and in no distress.   HENT:   Head: Normocephalic and atraumatic.   Eyes: Pupils are equal, round, and reactive to light. Right eye exhibits no discharge.   Neck: Normal range of motion.   Cardiovascular: Normal rate, regular rhythm and  normal heart sounds.  Exam reveals no gallop and no friction rub.    Pulmonary/Chest: Effort normal and breath sounds normal. No respiratory distress. She has no wheezes. She has no rales.   Abdominal: Soft. She exhibits no distension. There is no tenderness.   Lymphadenopathy:     She has no cervical adenopathy.   Neurological: She is alert and oriented to person, place, and time.   Normal muscle strength   Skin: No rash noted. No erythema.     No psoriasis rash   Psychiatric: Mood normal.   Musculoskeletal: Normal range of motion. She exhibits no edema or deformity.   liya hands with oa changes to her pip and dip joints, no synovitis, no dactylitis noted.      Full rom to liya wrists, mcps, pips    Elbows and shoulders have normal motion without tenderness.  C-spine with mild limitation on rotation minimally tender.  Lumbar spine with mild tenderness in the paravertebral areas with slight limited extension.    Hips and knees have normal range of motion with no swelling.    Ankles and toes with no tenderness or swelling.    No dactylitis today.    No enthesis tenderness            Results for orders placed or performed in visit on 06/10/19   CBC/Diff Ambigious Default   Result Value Ref Range    WBC 5.7 3.4 - 10.8 x10E3/uL    RBC 4.62 3.77 - 5.28 x10E6/uL    Hemoglobin 13.9 11.1 - 15.9 g/dL    Hematocrit 42.5 34.0 - 46.6 %    Mean Corpuscular Volume 92 79 - 97 fL    Mean Corpuscular Hemoglobin 30.1 26.6 - 33.0 pg    Mean Corpuscular Hemoglobin Conc 32.7 31.5 - 35.7 g/dL    RDW 13.7 12.3 - 15.4 %    Platelets 264 150 - 450 x10E3/uL    Neutrophils 53 Not Estab. %    Lymph% 32 Not Estab. %    Mono% 9 Not Estab. %    Eosinophil% 5 Not Estab. %    Basophil% 1 Not Estab. %    Neutrophils Absolute 3.0 1.4 - 7.0 x10E3/uL    Lymph # 1.8 0.7 - 3.1 x10E3/uL    Mono # 0.5 0.1 - 0.9 x10E3/uL    Eos # 0.3 0.0 - 0.4 x10E3/uL    Baso # 0.0 0.0 - 0.2 x10E3/uL    Immature Granulocytes 0 Not Estab. %    Immature Grans (Abs) 0.0 0.0 -  0.1 x10E3/uL   Comprehensive metabolic panel   Result Value Ref Range    Glucose 87 65 - 99 mg/dL    BUN, Bld 22 8 - 27 mg/dL    Creatinine 0.67 0.57 - 1.00 mg/dL    eGFR if non African American 93 >59 mL/min/1.73    eGFR if African American 107 >59 mL/min/1.73    BUN/Creatinine Ratio 33 (H) 12 - 28    Sodium 140 134 - 144 mmol/L    Potassium 4.3 3.5 - 5.2 mmol/L    Chloride 97 96 - 106 mmol/L    CO2 27 20 - 29 mmol/L    Calcium 10.8 (H) 8.7 - 10.3 mg/dL    Total Protein 7.8 6.0 - 8.5 g/dL    Albumin 4.7 3.6 - 4.8 g/dL    Globulin, Total 3.1 1.5 - 4.5 g/dL    Albumin/Globulin Ratio 1.5 1.2 - 2.2    Total Bilirubin 0.6 0.0 - 1.2 mg/dL    Alkaline Phosphatase 52 39 - 117 IU/L    AST 28 0 - 40 IU/L    ALT 27 0 - 32 IU/L   Sedimentation rate, automated   Result Value Ref Range    Sed Rate 2 0 - 40 mm/hr   C-reactive protein   Result Value Ref Range    CRP 0.4 0.0 - 4.9 mg/L   Specimen Status Report   Result Value Ref Range    Specimen Status Report Comment      Addendum: labs received- cbc and cmp wnl, vit D very low at 8.4, esr and crp wnl     Ct chest 6-14-16: Previously demonstrated tiny pulmonary nodules are not identified. There are no new nodules. There are one or 2 tiny nodules at the lateral left lung base. No pulmonary infiltrates or pleural effusions. No abnormality of the airway. No evidence of pathologic hilar or mediastinal lymphadenopathy     Assessment:         Impression:    Psoriatic arthritis-in remission with no swollen tender joints and pain score 0; CRP and sed rate normal, remaining on Humira, every other day Arava, daily Celebrex failed mtx due to development of ILD/lung nodules;     Psoriasis-body surface area less than 1%-in remission    Medication monitoring: no signs of toxicity, need to review labwork, get from [facility]    Immunocompromised:  Vaccines utd except zoster/shingrix-explained this is a healed vaccine in she and get this without worry of getting shingles-she accepts this and is  going to get the vaccine    DJD spine, neck back: ok today, with radicular symptoms occasionally-doing well-helped by Celebrex    Osteopenia reviewd today; normal bmd at spine L1-4, Osteopenia at hip both left femur neck and total femur mean  Lowest T score at left FN -2.3  Mod rsk fx: 11.7% christy, 2.2% hip    Hypo vitamin  D--recent level 8-she did not tolerate 07480 units weekly    Plan:     Add Vit d3- 1000 units day    No change in Humira -40 mg every other week    Do not take Humira if having fever, have an infection,  on an antibiotic, or having surgery in next week. Stay off 1-2 weeks until infection gone or healed from surgery. Call us if any question    Get new vaccine - Shingrix    Lower arava to mon, wed, fri    Change Celebrex from every day to prn daily if needed    Lab 1 week pre visit a [facility]    Recheck her QuantiFERON gold for TB status-has been 4 years    Recheck her hepatitis studies-has been 5 years    Healthy diet and continued exercise recommended    Please call or send portal message with any questions or concerns     Prolonged visit: Over  45 min spent in patient care and evaluation. Over 25 min of time used in reviewing recent labs, vaccination status and present symptoms, discussing diagnostic possibilities in need for repeat screening for TB and hepatitis, counseling on medication, exercise, dietary, and vaccination options, reviewing side effects of therapies , discussing her allergic symptoms, and coordination of care with  Family Dr   Patient's questions were all addressed and she understands our plans and risks.

## 2019-10-02 NOTE — ADDENDUM NOTE
Addended by: TAMIKA SEVERINO on: 4/11/2019 10:35 AM     Modules accepted: Orders     Jeyson Sheets a 16 year old male presents today with red rash on arms and feet. Also feel ankle is slightly swollen. Has not complained of pain or itching. Denies ankle pain or change in gait. Seen by dermatologist who felt skin on arms could be from photosensitivity from lamisil he was taking for toe nail fungus. Father states nails much improved and they are stopping medication for now.     See nurse's note.  Medications and allergies reviewed.    GENERAL:  alert, active, comfortable, not toxic, and in no acute distress  SKIN:  Forearms and dorsum on hands slight erythematous slightly raised, skin on legs slightly pigment in pattern of tan,   HEART:  quiet precordium, regular rate and rhythm without murmurs, clicks, or gallops and Capillary refill test <2 secs.  LUNGS:  Chest totally clear; no rales, rhonchi, wheezes or stridor, Excellent air exchange with normal I/E; and no tachyp or retractions  ANKLES:  No swelling or edema yasmani ankles or lower legs.     A/P:  See Diagnosis, Orders/Medication, and Follow-up instructions. Rash on arms likely photosensitivity as last couple weeks has been warmer and likely practiced football in short sleeves. Lower legs and ankles normal. Reviewed labs normally has high hgb and RBC's, LFT's normal. No need for further testing no sign of anything besides photosensitivity.

## 2019-10-03 DIAGNOSIS — L40.50 PSORIATIC ARTHRITIS: ICD-10-CM

## 2019-10-04 RX ORDER — CELECOXIB 200 MG/1
CAPSULE ORAL
Qty: 90 CAPSULE | Refills: 1 | Status: SHIPPED | OUTPATIENT
Start: 2019-10-04 | End: 2020-04-16

## 2019-12-18 ENCOUNTER — PATIENT OUTREACH (OUTPATIENT)
Dept: ADMINISTRATIVE | Facility: HOSPITAL | Age: 64
End: 2019-12-18

## 2019-12-18 NOTE — PROGRESS NOTES
Attempt to contact pt RE pap smear info or results. Left vm for pt to call the office. (Pt is 64 years of age)

## 2020-01-15 LAB
ALBUMIN SERPL-MCNC: 4.8 G/DL (ref 3.6–4.8)
ALBUMIN/GLOB SERPL: 1.8 {RATIO} (ref 1.2–2.2)
ALP SERPL-CCNC: 60 IU/L (ref 39–117)
ALT SERPL-CCNC: 27 IU/L (ref 0–32)
AST SERPL-CCNC: 30 IU/L (ref 0–40)
BASOPHILS # BLD AUTO: 0 X10E3/UL (ref 0–0.2)
BASOPHILS NFR BLD AUTO: 1 %
BILIRUB SERPL-MCNC: 0.6 MG/DL (ref 0–1.2)
BUN SERPL-MCNC: 14 MG/DL (ref 8–27)
BUN/CREAT SERPL: 19 (ref 12–28)
CALCIUM SERPL-MCNC: 10.3 MG/DL (ref 8.7–10.3)
CHLORIDE SERPL-SCNC: 101 MMOL/L (ref 96–106)
CO2 SERPL-SCNC: 27 MMOL/L (ref 20–29)
CREAT SERPL-MCNC: 0.73 MG/DL (ref 0.57–1)
CRP SERPL-MCNC: <1 MG/L (ref 0–10)
EOSINOPHIL # BLD AUTO: 0.3 X10E3/UL (ref 0–0.4)
EOSINOPHIL NFR BLD AUTO: 7 %
ERYTHROCYTE [DISTWIDTH] IN BLOOD BY AUTOMATED COUNT: 13.6 % (ref 11.7–15.4)
ERYTHROCYTE [SEDIMENTATION RATE] IN BLOOD BY WESTERGREN METHOD: 5 MM/HR (ref 0–40)
GAMMA INTERFERON BACKGROUND BLD IA-ACNC: 0.06 IU/ML
GLOBULIN SER CALC-MCNC: 2.6 G/DL (ref 1.5–4.5)
GLUCOSE SERPL-MCNC: 88 MG/DL (ref 65–99)
HBV CORE AB SERPL QL IA: NEGATIVE
HBV CORE IGM SERPL QL IA: NEGATIVE
HBV SURFACE AG SERPL QL IA: NEGATIVE
HCT VFR BLD AUTO: 42.2 % (ref 34–46.6)
HGB BLD-MCNC: 13.5 G/DL (ref 11.1–15.9)
IMM GRANULOCYTES # BLD AUTO: 0 X10E3/UL (ref 0–0.1)
IMM GRANULOCYTES NFR BLD AUTO: 0 %
LYMPHOCYTES # BLD AUTO: 2.1 X10E3/UL (ref 0.7–3.1)
LYMPHOCYTES NFR BLD AUTO: 41 %
M TB IFN-G BLD-IMP: NEGATIVE
M TB IFN-G CD4+ BCKGRND COR BLD-ACNC: 0.06 IU/ML
MCH RBC QN AUTO: 29.9 PG (ref 26.6–33)
MCHC RBC AUTO-ENTMCNC: 32 G/DL (ref 31.5–35.7)
MCV RBC AUTO: 93 FL (ref 79–97)
MITOGEN IGNF BLD-ACNC: >10 IU/ML
MONOCYTES # BLD AUTO: 0.6 X10E3/UL (ref 0.1–0.9)
MONOCYTES NFR BLD AUTO: 12 %
NEUTROPHILS # BLD AUTO: 1.9 X10E3/UL (ref 1.4–7)
NEUTROPHILS NFR BLD AUTO: 39 %
PLATELET # BLD AUTO: 259 X10E3/UL (ref 150–450)
POTASSIUM SERPL-SCNC: 5.3 MMOL/L (ref 3.5–5.2)
PROT SERPL-MCNC: 7.4 G/DL (ref 6–8.5)
QUANTIFERON TB GOLD (INCUBATED): NORMAL
QUANTIFERON TB2 AG VALUE: 0.05 IU/ML
RBC # BLD AUTO: 4.52 X10E6/UL (ref 3.77–5.28)
SERVICE CMNT-IMP: NORMAL
SODIUM SERPL-SCNC: 145 MMOL/L (ref 134–144)
WBC # BLD AUTO: 5 X10E3/UL (ref 3.4–10.8)

## 2020-01-20 DIAGNOSIS — L40.9 PSORIASIS: ICD-10-CM

## 2020-01-20 DIAGNOSIS — L40.50 PSORIATIC ARTHRITIS: ICD-10-CM

## 2020-01-23 ENCOUNTER — PATIENT OUTREACH (OUTPATIENT)
Dept: ADMINISTRATIVE | Facility: HOSPITAL | Age: 65
End: 2020-01-23

## 2020-02-13 ENCOUNTER — OFFICE VISIT (OUTPATIENT)
Dept: RHEUMATOLOGY | Facility: CLINIC | Age: 65
End: 2020-02-13
Payer: COMMERCIAL

## 2020-02-13 VITALS
DIASTOLIC BLOOD PRESSURE: 91 MMHG | SYSTOLIC BLOOD PRESSURE: 142 MMHG | BODY MASS INDEX: 24.56 KG/M2 | HEIGHT: 67 IN | HEART RATE: 62 BPM | WEIGHT: 156.5 LBS

## 2020-02-13 DIAGNOSIS — G62.9 NEUROPATHY: ICD-10-CM

## 2020-02-13 DIAGNOSIS — L40.50 PSORIATIC ARTHRITIS: Primary | ICD-10-CM

## 2020-02-13 DIAGNOSIS — L40.9 PSORIASIS: ICD-10-CM

## 2020-02-13 DIAGNOSIS — R79.89 LOW VITAMIN D LEVEL: ICD-10-CM

## 2020-02-13 DIAGNOSIS — M47.817 LUMBAR AND SACRAL OSTEOARTHRITIS: ICD-10-CM

## 2020-02-13 DIAGNOSIS — D84.9 IMMUNOCOMPROMISED: ICD-10-CM

## 2020-02-13 PROCEDURE — 3008F BODY MASS INDEX DOCD: CPT | Mod: CPTII,S$GLB,, | Performed by: PHYSICIAN ASSISTANT

## 2020-02-13 PROCEDURE — 99999 PR PBB SHADOW E&M-EST. PATIENT-LVL IV: ICD-10-PCS | Mod: PBBFAC,,, | Performed by: PHYSICIAN ASSISTANT

## 2020-02-13 PROCEDURE — 99999 PR PBB SHADOW E&M-EST. PATIENT-LVL IV: CPT | Mod: PBBFAC,,, | Performed by: PHYSICIAN ASSISTANT

## 2020-02-13 PROCEDURE — 3008F PR BODY MASS INDEX (BMI) DOCUMENTED: ICD-10-PCS | Mod: CPTII,S$GLB,, | Performed by: PHYSICIAN ASSISTANT

## 2020-02-13 PROCEDURE — 99214 OFFICE O/P EST MOD 30 MIN: CPT | Mod: S$GLB,,, | Performed by: PHYSICIAN ASSISTANT

## 2020-02-13 PROCEDURE — 99214 PR OFFICE/OUTPT VISIT, EST, LEVL IV, 30-39 MIN: ICD-10-PCS | Mod: S$GLB,,, | Performed by: PHYSICIAN ASSISTANT

## 2020-02-13 RX ORDER — DULOXETIN HYDROCHLORIDE 30 MG/1
30 CAPSULE, DELAYED RELEASE ORAL DAILY
Qty: 30 CAPSULE | Refills: 4 | Status: SHIPPED | OUTPATIENT
Start: 2020-02-13 | End: 2020-06-25

## 2020-02-13 NOTE — PROGRESS NOTES
Subjective:       Patient ID: Marciarmen Becerril is a 64 y.o. female.    Chief Complaint: PsA, psoriasis    Maricarmen is here for routine follow up for psoriatic arthritis with psoriasis.      She in the past has been on mtx but this was discontinued due to development of ILD and lung nodules.  She is currently on Arava 20mg mon/wed/fri and humira 40mg every other week     Mild rash in her ears, has kenalog to use prn, remainder of skin clear    Her joint pain is 0/10.   Some intermittent neck and left shoulder pain   She has chronic neck and back pain due to degenerative arthritis, bulging discs (mri l spine in 2013 with disc disease, deg arthritis).   Some numbness and aching in her feet for many years.       Celebrex only prn now;  Off gabapentin, bipin her sleepy.      She sees pulmonary for her lungs and her last CT was stable.     DEXA done today- no prior dexa on file (looked back to legacy documents)- pt denies ever having one done, not on supplement Ca or vit D, no fx hx            She reports no joint swelling. Pertinent negatives include no dysuria, fatigue, fever, myalgias or headaches.               She reports no joint swelling. Pertinent negatives include no dysuria, fatigue, fever, myalgias or headaches.       Psoriasis   Associated symptoms include a rash. Pertinent negatives include no abdominal pain, arthralgias, chest pain, chills, coughing, fatigue, fever, headaches, joint swelling, myalgias, nausea, neck pain, numbness, sore throat, vomiting or weakness.     Review of Systems   Constitutional: Negative for chills, fatigue and fever.   HENT: Negative for mouth sores, rhinorrhea and sore throat.    Eyes: Negative for pain and redness.   Respiratory: Negative for cough and shortness of breath.    Cardiovascular: Negative for chest pain.   Gastrointestinal: Negative for abdominal pain, constipation, diarrhea, nausea and vomiting.   Genitourinary: Negative for dysuria and hematuria.  "  Musculoskeletal: Negative for arthralgias, back pain, joint swelling, myalgias and neck pain.   Skin: Positive for rash.   Neurological: Negative for weakness, numbness and headaches.   Psychiatric/Behavioral: The patient is not nervous/anxious.          Objective:   BP (!) 142/91   Pulse 62   Ht 5' 7" (1.702 m)   Wt 71 kg (156 lb 8.4 oz)   BMI 24.52 kg/m²      Physical Exam   Constitutional: She is oriented to person, place, and time and well-developed, well-nourished, and in no distress.   HENT:   Head: Normocephalic and atraumatic.   Eyes: Pupils are equal, round, and reactive to light. Right eye exhibits no discharge.   Neck: Normal range of motion.   Cardiovascular: Normal rate, regular rhythm and normal heart sounds.  Exam reveals no friction rub.    Pulmonary/Chest: Effort normal and breath sounds normal. No respiratory distress.   Abdominal: Soft. She exhibits no distension. There is no tenderness.   Lymphadenopathy:     She has no cervical adenopathy.   Neurological: She is alert and oriented to person, place, and time.   Skin: Rash noted. No erythema.          No psoriasis rash   Psychiatric: Mood normal.   Musculoskeletal: Normal range of motion. She exhibits no edema or deformity.   liya hands with oa changes to her pip and dip joints, no synovitis, no dactylitis noted.      Full rom to liya wrists, mcps, pips    Full rom liya knees          Recent Results (from the past 1008 hour(s))   CBC/Diff Ambigious Default    Collection Time: 01/09/20  7:58 AM   Result Value Ref Range    WBC 5.0 3.4 - 10.8 x10E3/uL    RBC 4.52 3.77 - 5.28 x10E6/uL    Hemoglobin 13.5 11.1 - 15.9 g/dL    Hematocrit 42.2 34.0 - 46.6 %    Mean Corpuscular Volume 93 79 - 97 fL    Mean Corpuscular Hemoglobin 29.9 26.6 - 33.0 pg    Mean Corpuscular Hemoglobin Conc 32.0 31.5 - 35.7 g/dL    RDW 13.6 11.7 - 15.4 %    Platelets 259 150 - 450 x10E3/uL    Neutrophils 39 Not Estab. %    Lymph% 41 Not Estab. %    Mono% 12 Not Estab. %    " Eosinophil% 7 Not Estab. %    Basophil% 1 Not Estab. %    Neutrophils Absolute 1.9 1.4 - 7.0 x10E3/uL    Lymph # 2.1 0.7 - 3.1 x10E3/uL    Mono # 0.6 0.1 - 0.9 x10E3/uL    Eos # 0.3 0.0 - 0.4 x10E3/uL    Baso # 0.0 0.0 - 0.2 x10E3/uL    Immature Granulocytes 0 Not Estab. %    Immature Grans (Abs) 0.0 0.0 - 0.1 x10E3/uL   Comprehensive metabolic panel    Collection Time: 01/09/20  7:58 AM   Result Value Ref Range    Glucose 88 65 - 99 mg/dL    BUN, Bld 14 8 - 27 mg/dL    Creatinine 0.73 0.57 - 1.00 mg/dL    eGFR if non African American 87 >59 mL/min/1.73    eGFR if African American 101 >59 mL/min/1.73    BUN/Creatinine Ratio 19 12 - 28    Sodium 145 (H) 134 - 144 mmol/L    Potassium 5.3 (H) 3.5 - 5.2 mmol/L    Chloride 101 96 - 106 mmol/L    CO2 27 20 - 29 mmol/L    Calcium 10.3 8.7 - 10.3 mg/dL    Total Protein 7.4 6.0 - 8.5 g/dL    Albumin 4.8 3.6 - 4.8 g/dL    Globulin, Total 2.6 1.5 - 4.5 g/dL    Albumin/Globulin Ratio 1.8 1.2 - 2.2    Total Bilirubin 0.6 0.0 - 1.2 mg/dL    Alkaline Phosphatase 60 39 - 117 IU/L    AST 30 0 - 40 IU/L    ALT 27 0 - 32 IU/L   Hep B core Ab, IgG,IgM Differentiation    Collection Time: 01/09/20  7:58 AM   Result Value Ref Range    Hep B C IgM Negative Negative    Hep B Core Total Ab Negative Negative   Quantiferon Gold TB    Collection Time: 01/09/20  7:58 AM   Result Value Ref Range    Quantiferon TB Gold (Incubated) Incubation performed.     QuantiFERON Criteria Comment     QuantiFERON TB1 Ag Value 0.06 IU/mL    QuantiFERON TB2 Ag Value 0.05 IU/mL    Quantiferon Nil Value 0.06 IU/mL    Quantiferon Mitogen Value >10.00 IU/mL    QuantiFERON-TB Gold Plus Negative Negative   Sedimentation rate, automated    Collection Time: 01/09/20  7:58 AM   Result Value Ref Range    Sed Rate 5 0 - 40 mm/hr   C-reactive protein    Collection Time: 01/09/20  7:58 AM   Result Value Ref Range    CRP <1 0 - 10 mg/L   Hepatitis B surface antigen    Collection Time: 01/09/20  7:58 AM   Result Value Ref  Range    Hepatitis B Surface Ag Negative Negative           Ct chest 6-14-16: Previously demonstrated tiny pulmonary nodules are not identified. There are no new nodules. There are one or 2 tiny nodules at the lateral left lung base. No pulmonary infiltrates or pleural effusions. No abnormality of the airway. No evidence of pathologic hilar or mediastinal lymphadenopathy     Assessment:       1. Psoriatic arthritis    2. Psoriasis    3. Immunocompromised        Impression:    Psoriatic arthritis with h/o psoriasis:  Well controlled on humira 40mg every other week and arava 20mg 3 X week and prn celebrex 200mg/d;     failed mtx due to development of ILD/lung nodules;     Mild skin rash <1% BSA   No enthesitis, dactylitis or synovitis  No Axial involvement     NOWAK-28 (CRP): 1.26 (Remission)  CDAI 0  GURJIT 0.6    Medication monitoring: no signs of toxicity, need to review labwork, get from [facility]    Immunocompromised:  utd except zoster/shingrix, she is declining the vaccine at this time  TB gold and Hep studies UTD 1/2020    Lung disease/lung nodules: thought due to mtx, pulm following, this is stable    DJD spine, neck back: ok today, with radicular symptoms liya legs/neuropathy, stable, celebrex helps; off gabapentin made her drowsy, takes tumeric daily         Osteopenia reviewd today; normal bmd at spine L1-4, Osteopenia at hip both left femur neck and total femur mean  Lowest T score at left FN -2.3  Mod rsk fx: 11.7% christy, 2.2% hip    Vit D def- level 8: on supplemental vit D3 otc not sure what dose       Neuropathy and Chronic foot pain; djd spine- Cervical and Lumbar  Failed gabapentin   Plan:         Continue humira 40mg every other week and c/w arava 3X per week  Topical kenalog to rash bid    Take vit D3 at least 3878-4785 IU per day and check level next visit   Ca in diet  Watch Sodium and Potassium intake       Osteopenia with mod risk fx- Repeat dexa in 2 years (2/2020); treat if indicated then but for now  no need to treat  Later this year     Cont f/u with pulm for ILD    Try Cymbalta 30 mg/d- may  Help her chonic foot pain/neuropathy   Discussed risk versus benefits and potential side effects of Cymbalta . Medication Literature given to patient. Discussed with pt option to decline treatment if that is their wish, discussed potential consequences of not treating.     Return in 4 mos with labwork - reg 4 and vit D     Please call or send portal message with any questions or concerns

## 2020-02-25 ENCOUNTER — TELEPHONE (OUTPATIENT)
Dept: RHEUMATOLOGY | Facility: CLINIC | Age: 65
End: 2020-02-25

## 2020-03-10 DIAGNOSIS — L40.50 PSORIATIC ARTHRITIS: ICD-10-CM

## 2020-03-10 NOTE — TELEPHONE ENCOUNTER
----- Message from [facility] sent at 3/9/2020 11:23 AM CDT -----  Contact: pt  Pt called requesting a refill on levothyroxine (SYNTHROID) 75 MCG tablet. Pt stated that her pharmacy has been sending refill request but none has been replied to.    Pharmacy info ph. [phone] Fax [phone]      Pt can be reached at [phone]

## 2020-03-11 RX ORDER — LEVOTHYROXINE SODIUM 75 UG/1
TABLET ORAL
Qty: 90 TABLET | Refills: 1 | Status: SHIPPED | OUTPATIENT
Start: 2020-03-11 | End: 2020-09-18 | Stop reason: SDUPTHER

## 2020-03-11 NOTE — TELEPHONE ENCOUNTER
----- Message from Camilla Coleman sent at 3/11/2020 10:02 AM CDT -----  Contact: Patient   Patient would like an call back about her medication , patient stated she has been calling for 2 weeks now and would like to speak to someone , please call back at [phone].         Thanks,  Camilla Coleman

## 2020-04-04 ENCOUNTER — PATIENT MESSAGE (OUTPATIENT)
Dept: INTERNAL MEDICINE | Facility: CLINIC | Age: 65
End: 2020-04-04

## 2020-04-16 ENCOUNTER — PATIENT MESSAGE (OUTPATIENT)
Dept: INTERNAL MEDICINE | Facility: CLINIC | Age: 65
End: 2020-04-16

## 2020-04-16 ENCOUNTER — OFFICE VISIT (OUTPATIENT)
Dept: INTERNAL MEDICINE | Facility: CLINIC | Age: 65
End: 2020-04-16
Payer: COMMERCIAL

## 2020-04-16 ENCOUNTER — TELEPHONE (OUTPATIENT)
Dept: INTERNAL MEDICINE | Facility: CLINIC | Age: 65
End: 2020-04-16

## 2020-04-16 VITALS — WEIGHT: 152 LBS | BODY MASS INDEX: 23.81 KG/M2

## 2020-04-16 DIAGNOSIS — Z12.11 SCREEN FOR COLON CANCER: ICD-10-CM

## 2020-04-16 DIAGNOSIS — D84.9 IMMUNOCOMPROMISED: ICD-10-CM

## 2020-04-16 DIAGNOSIS — E03.9 HYPOTHYROIDISM, UNSPECIFIED TYPE: ICD-10-CM

## 2020-04-16 DIAGNOSIS — Z00.00 ROUTINE HEALTH MAINTENANCE: Primary | ICD-10-CM

## 2020-04-16 DIAGNOSIS — Z11.4 ENCOUNTER FOR SCREENING FOR HUMAN IMMUNODEFICIENCY VIRUS (HIV): ICD-10-CM

## 2020-04-16 DIAGNOSIS — L40.50 PSORIATIC ARTHRITIS: ICD-10-CM

## 2020-04-16 PROCEDURE — 99213 PR OFFICE/OUTPT VISIT, EST, LEVL III, 20-29 MIN: ICD-10-PCS | Mod: 95,,, | Performed by: FAMILY MEDICINE

## 2020-04-16 PROCEDURE — 99213 OFFICE O/P EST LOW 20 MIN: CPT | Mod: 95,,, | Performed by: FAMILY MEDICINE

## 2020-04-16 NOTE — TELEPHONE ENCOUNTER
I informed the pt that the provider will be on with her shortly he is still with the 10:40 pt . She verbalized understanding. //kah

## 2020-04-16 NOTE — PROGRESS NOTES
Subjective:       Patient ID: Maricarmen Becerril is a . female.    Chief Complaint: here for physical examination and issues  below    HPI The patient location is:home  The chief complaint leading to consultation is in hpi  Visit type: Virtual visit with synchronous audio and video  Total time spent with patient: 20  Each patient to whom he or she provides medical services by telemedicine is:  (1) informed of the relationship between the physician and patient and the respective role of any other health care provider with respect to management of the patient; and (2) notified that he or she may decline to receive medical services by telemedicine and may withdraw from such care at any time.    Hypothyroidism: hx  [facility] tshchol. Tolerating med. asympt    psor arth sees rheum    prev d/wd 2011 mri show liver lesions most likely hemangiomas w recomm fede; d/w this and reason for but she declined    intersit lungdx/nodules pulm  due 7/17 but ct chest and pfts ok and no change sympt so she defers f/u pulm      Doesn't have a bp machine to use today    Past Medical History:   Diagnosis Date    Acid reflux     Allergic rhinitis     Allergy     Arthritis     Dry mouth     Heart murmur     Hypothyroidism     SECONDARY    Lung disease     mtx related    Positive SANA (antinuclear antibody)     Psoriatic arthritis      Past Surgical History:   Procedure Laterality Date    DILATION AND CURETTAGE OF UTERUS      MANDIBLE FRACTURE SURGERY      OTHER SURGICAL HISTORY      uterine suspension    THYROIDECTOMY      TONSILLECTOMY       Family History   Problem Relation Age of Onset    Parkinsonism Mother     Cataracts Mother     Gout Father     Hypothyroidism Father     Cataracts Father     Hypertension Father      Social History     Socioeconomic History    Marital status:      Spouse name: BOLIVAR    Number of children: Not on file    Years of education: Not on file    Highest education level: Not  on file   Occupational History     Employer: SELF-EMPLOYED   Social Needs    Financial resource strain: Not hard at all    Food insecurity:     Worry: Never true     Inability: Never true    Transportation needs:     Medical: No     Non-medical: No   Tobacco Use    Smoking status: Never Smoker    Smokeless tobacco: Never Used   Substance and Sexual Activity    Alcohol use: No     Frequency: Never     Binge frequency: Never    Drug use: No    Sexual activity: Not on file   Lifestyle    Physical activity:     Days per week: 2 days     Minutes per session: 20 min    Stress: Not at all   Relationships    Social connections:     Talks on phone: Once a week     Gets together: More than three times a week     Attends Confucianism service: Not on file     Active member of club or organization: No     Attends meetings of clubs or organizations: Never     Relationship status:    Other Topics Concern    Not on file   Social History Narrative    Not on file           Review of Systems  Cardiovascular: no chest pain    Abd: no abd pain  Remainder review of systems negative    Objective:      Physical Exam   Constitutional: She is oriented to person, place, and time. She appears well-developed and well-nourished. No distress.   HENT:   Head: Atraumatic.   Right Ear: External ear normal.   Left Ear: External ear normal.        Neurological: She is alert and oriented to person, place, and time. No cranial nerve deficit. She exhibits normal muscle tone. Coordination normal.   Skin: Skin is warm. No rash noted. No erythema. No pallor.   Psychiatric: She has a normal mood and affect. Her behavior is normal. Judgment and thought content normal.   Nursing note and vitals reviewed.      Assessment:       1. Routine health maintenance    2. Hypothyroidism, unspecified hypothyroidism type          Psori. Arthritis      Plan:     F/u one yr:sched tsh chol then  w lab [facility] rx today for june  *  Monitor blood pressure.  Notify if 140/90 or higher  F/u rheum when due  .**  Add:note prev d/wd 2011 mri show liver lesions most likely hemangiomas w recomm fede; d/w this and reason for but she declined    She will call dr larson for pap. Was about 2 y/a per her  Routine health maintenance    Psoriatic arthritis    Immunocompromised    Hypothyroidism, unspecified type  -     TSH; Future; Expected date: 06/15/2020  -     Lipid panel; Future; Expected date: 06/15/2020    Screen for colon cancer  -     Fecal Immunochemical Test (iFOBT); Future; Expected date: 04/16/2020    Encounter for screening for human immunodeficiency virus (HIV)  -     HIV 1/2 Ag/Ab (4th Gen); Future; Expected date: 06/15/2020

## 2020-04-22 ENCOUNTER — TELEPHONE (OUTPATIENT)
Dept: INTERNAL MEDICINE | Facility: CLINIC | Age: 65
End: 2020-04-22

## 2020-04-22 NOTE — TELEPHONE ENCOUNTER
----- Message from Lauren Kolb MA sent at 4/20/2020  3:29 PM CDT -----      ----- Message -----  From: Ryan Viramontes MD  Sent: 4/20/2020   1:58 PM CDT  To: [facility]ander [facility] Staff    Please check lab to link if hasnt been done . It was ordered. If not please check with Enika  ----- Message -----  From: Kira Mcghee MA  Sent: 4/16/2020   4:36 PM CDT  To: Ryan Viramontes MD    I see the fitkit order but, no labs // anne  ----- Message -----  From: Ryan Viramontes MD  Sent: 4/16/2020   3:19 PM CDT  To: Kira Mcghee MA    I ordered this morning please see below    ypothyroidism, unspecified type  -     TSH; Future; Expected date: 06/15/2020  -     Lipid panel; Future; Expected date: 06/15/2020    Screen for colon cancer  -     Fecal Immunochemical Test (iFOBT); Future; Expected date: 04/16/2020    Encounter for screening for human immunodeficiency virus (HIV)  -     HIV 1/2 Ag/Ab (4th Gen); Future; Expected date: 06/15/2020        ----- Message -----  From: Kira Mcghee MA  Sent: 4/16/2020   2:32 PM CDT  To: Ryan Viramontes MD    I did not see any labs in the chart for her from you to be sent to [facility]. //anne  ----- Message -----  From: Ryan Viramontes MD  Sent: 4/16/2020  11:48 AM CDT  To: [facility] Staff    Please add lab to June lab  Also f/u one year

## 2020-05-05 ENCOUNTER — PATIENT MESSAGE (OUTPATIENT)
Dept: RHEUMATOLOGY | Facility: CLINIC | Age: 65
End: 2020-05-05

## 2020-05-14 ENCOUNTER — TELEPHONE (OUTPATIENT)
Dept: RHEUMATOLOGY | Facility: CLINIC | Age: 65
End: 2020-05-14

## 2020-05-14 NOTE — TELEPHONE ENCOUNTER
----- Message from Anderson Felix sent at 5/14/2020 11:17 AM CDT -----  Contact: self/  Would like to consult with nurse regarding medication(adalimumab (HUMIRA,CF, PEN) 40 mg/0.4 mL PnKt 0, patient states she has some question. Please call back at [phone]. Thanks/ar

## 2020-05-14 NOTE — TELEPHONE ENCOUNTER
Pt states she is turning 65 next week, going on medicare, getting [facility] for prescription, given name for specialty pharm, insurance changing June 1st, advised pt to contact us once it changes.    [phone] [facility]

## 2020-06-01 DIAGNOSIS — L40.9 PSORIASIS: ICD-10-CM

## 2020-06-01 DIAGNOSIS — L40.50 PSORIATIC ARTHRITIS: ICD-10-CM

## 2020-06-01 NOTE — TELEPHONE ENCOUNTER
Patients states that prescription for Humira now needs to be sent to [facility] at [phone] (phone).     Patient has not enrolled with them yet, advised patient to set up an account with them so we escribe the prescription

## 2020-06-29 DIAGNOSIS — L40.50 PSORIATIC ARTHRITIS: ICD-10-CM

## 2020-06-29 RX ORDER — LEFLUNOMIDE 20 MG/1
TABLET ORAL
Qty: 16 TABLET | Refills: 5 | Status: SHIPPED | OUTPATIENT
Start: 2020-06-29 | End: 2021-03-01

## 2020-06-29 NOTE — TELEPHONE ENCOUNTER
----- Message from Christine George sent at 6/29/2020 10:14 AM CDT -----  Pt and pharmacy has called to get a refill on leflunomide (ARAVA) 20 MG Tab. Pt stated that the pharmacy sent over a request and now she has please reach out to pt at [phone] when the prescription is sent to pharmacy pt is currently out of this medication     Pharmacy info [phone] Fax [phone]

## 2020-07-01 ENCOUNTER — PATIENT OUTREACH (OUTPATIENT)
Dept: ADMINISTRATIVE | Facility: OTHER | Age: 65
End: 2020-07-01

## 2020-07-01 NOTE — PROGRESS NOTES
Requested updates within Care Everywhere.  Patient's chart was reviewed for overdue ETIENNE topics.  Immunizations reconciled.

## 2020-07-02 ENCOUNTER — OFFICE VISIT (OUTPATIENT)
Dept: RHEUMATOLOGY | Facility: CLINIC | Age: 65
End: 2020-07-02
Payer: COMMERCIAL

## 2020-07-02 ENCOUNTER — LAB VISIT (OUTPATIENT)
Dept: LAB | Facility: HOSPITAL | Age: 65
End: 2020-07-02
Attending: PHYSICIAN ASSISTANT
Payer: MEDICARE

## 2020-07-02 VITALS
WEIGHT: 155 LBS | DIASTOLIC BLOOD PRESSURE: 88 MMHG | HEIGHT: 67 IN | BODY MASS INDEX: 24.33 KG/M2 | SYSTOLIC BLOOD PRESSURE: 141 MMHG | HEART RATE: 68 BPM

## 2020-07-02 DIAGNOSIS — R79.89 LOW VITAMIN D LEVEL: ICD-10-CM

## 2020-07-02 DIAGNOSIS — Z79.899 ENCOUNTER FOR LONG-TERM (CURRENT) USE OF HIGH-RISK MEDICATION: ICD-10-CM

## 2020-07-02 DIAGNOSIS — D84.9 IMMUNOCOMPROMISED: ICD-10-CM

## 2020-07-02 DIAGNOSIS — L40.9 PSORIASIS: ICD-10-CM

## 2020-07-02 DIAGNOSIS — L40.50 PSORIATIC ARTHRITIS: Primary | ICD-10-CM

## 2020-07-02 DIAGNOSIS — L40.50 PSORIATIC ARTHRITIS: ICD-10-CM

## 2020-07-02 LAB
25(OH)D3+25(OH)D2 SERPL-MCNC: 20 NG/ML (ref 30–96)
ALBUMIN SERPL BCP-MCNC: 4.1 G/DL (ref 3.5–5.2)
ALP SERPL-CCNC: 61 U/L (ref 55–135)
ALT SERPL W/O P-5'-P-CCNC: 31 U/L (ref 10–44)
ANION GAP SERPL CALC-SCNC: 12 MMOL/L (ref 8–16)
AST SERPL-CCNC: 33 U/L (ref 10–40)
BASOPHILS # BLD AUTO: 0.06 K/UL (ref 0–0.2)
BASOPHILS NFR BLD: 1.3 % (ref 0–1.9)
BILIRUB SERPL-MCNC: 0.8 MG/DL (ref 0.1–1)
BUN SERPL-MCNC: 13 MG/DL (ref 8–23)
CALCIUM SERPL-MCNC: 9.9 MG/DL (ref 8.7–10.5)
CHLORIDE SERPL-SCNC: 103 MMOL/L (ref 95–110)
CO2 SERPL-SCNC: 25 MMOL/L (ref 23–29)
CREAT SERPL-MCNC: 0.7 MG/DL (ref 0.5–1.4)
CRP SERPL-MCNC: 0.3 MG/L (ref 0–8.2)
DIFFERENTIAL METHOD: ABNORMAL
EOSINOPHIL # BLD AUTO: 0.6 K/UL (ref 0–0.5)
EOSINOPHIL NFR BLD: 13.5 % (ref 0–8)
ERYTHROCYTE [DISTWIDTH] IN BLOOD BY AUTOMATED COUNT: 13.6 % (ref 11.5–14.5)
ERYTHROCYTE [SEDIMENTATION RATE] IN BLOOD BY WESTERGREN METHOD: 20 MM/HR (ref 0–36)
EST. GFR  (AFRICAN AMERICAN): >60 ML/MIN/1.73 M^2
EST. GFR  (NON AFRICAN AMERICAN): >60 ML/MIN/1.73 M^2
GLUCOSE SERPL-MCNC: 91 MG/DL (ref 70–110)
HCT VFR BLD AUTO: 43.2 % (ref 37–48.5)
HGB BLD-MCNC: 13.7 G/DL (ref 12–16)
IMM GRANULOCYTES # BLD AUTO: 0 K/UL (ref 0–0.04)
IMM GRANULOCYTES NFR BLD AUTO: 0 % (ref 0–0.5)
LYMPHOCYTES # BLD AUTO: 1.8 K/UL (ref 1–4.8)
LYMPHOCYTES NFR BLD: 39.3 % (ref 18–48)
MCH RBC QN AUTO: 30.4 PG (ref 27–31)
MCHC RBC AUTO-ENTMCNC: 31.7 G/DL (ref 32–36)
MCV RBC AUTO: 96 FL (ref 82–98)
MONOCYTES # BLD AUTO: 0.4 K/UL (ref 0.3–1)
MONOCYTES NFR BLD: 9.9 % (ref 4–15)
NEUTROPHILS # BLD AUTO: 1.6 K/UL (ref 1.8–7.7)
NEUTROPHILS NFR BLD: 36 % (ref 38–73)
NRBC BLD-RTO: 0 /100 WBC
PLATELET # BLD AUTO: 236 K/UL (ref 150–350)
PMV BLD AUTO: 10.4 FL (ref 9.2–12.9)
POTASSIUM SERPL-SCNC: 4.2 MMOL/L (ref 3.5–5.1)
PROT SERPL-MCNC: 8 G/DL (ref 6–8.4)
RBC # BLD AUTO: 4.5 M/UL (ref 4–5.4)
SODIUM SERPL-SCNC: 140 MMOL/L (ref 136–145)
WBC # BLD AUTO: 4.45 K/UL (ref 3.9–12.7)

## 2020-07-02 PROCEDURE — 99214 PR OFFICE/OUTPT VISIT, EST, LEVL IV, 30-39 MIN: ICD-10-PCS | Mod: S$PBB,,, | Performed by: PHYSICIAN ASSISTANT

## 2020-07-02 PROCEDURE — 36415 COLL VENOUS BLD VENIPUNCTURE: CPT

## 2020-07-02 PROCEDURE — 99213 OFFICE O/P EST LOW 20 MIN: CPT | Mod: PBBFAC | Performed by: PHYSICIAN ASSISTANT

## 2020-07-02 PROCEDURE — 80053 COMPREHEN METABOLIC PANEL: CPT

## 2020-07-02 PROCEDURE — 86140 C-REACTIVE PROTEIN: CPT

## 2020-07-02 PROCEDURE — 99999 PR PBB SHADOW E&M-EST. PATIENT-LVL III: ICD-10-PCS | Mod: PBBFAC,,, | Performed by: PHYSICIAN ASSISTANT

## 2020-07-02 PROCEDURE — 85652 RBC SED RATE AUTOMATED: CPT

## 2020-07-02 PROCEDURE — 85025 COMPLETE CBC W/AUTO DIFF WBC: CPT

## 2020-07-02 PROCEDURE — 99999 PR PBB SHADOW E&M-EST. PATIENT-LVL III: CPT | Mod: PBBFAC,,, | Performed by: PHYSICIAN ASSISTANT

## 2020-07-02 PROCEDURE — 99214 OFFICE O/P EST MOD 30 MIN: CPT | Mod: S$PBB,,, | Performed by: PHYSICIAN ASSISTANT

## 2020-07-02 PROCEDURE — 82306 VITAMIN D 25 HYDROXY: CPT

## 2020-07-02 NOTE — PROGRESS NOTES
Subjective:       Patient ID: Maricarmen Becerril is a 65 y.o. female.    Chief Complaint: PsA, psoriasis    Maricarmen is here for routine follow up for psoriatic arthritis with psoriasis.      She in the past has been on mtx but this was discontinued due to development of ILD and lung nodules.  She is currently on Arava 20mg mon/wed/fri and humira 40mg every other week     Mild rash right ear and scalp behind left ear; has kenalog to use prn, remainder of skin clear    Her joint pain is 0/10.   She has chronic neck and back pain due to degenerative arthritis, bulging discs (mri l spine in 2013 with disc disease, deg arthritis). This comes and goes; no issues currently   Some numbness and aching in her feet for many years.       Celebrex only prn now;  Off gabapentin, bipin her sleepy.    Now on cymbalta 30 mg once a day for neuropathy and spine pain     She sees pulmonary for her lungs and her last CT was stable.     DEXA done 2/13/2020- no prior dexa on file (looked back to legacy documents)- pt denies ever having one done, not on supplement Ca or vit D, no fx hx  Vit D very low at 8  Was on high dose but stopped. Was supposed to transition to otc but did not     Psoriasis  Associated symptoms include a rash. Pertinent negatives include no abdominal pain, arthralgias, chest pain, chills, coughing, fatigue, fever, headaches, joint swelling, myalgias, nausea, neck pain, numbness, sore throat, vomiting or weakness.           She reports no joint swelling. Pertinent negatives include no dysuria, fatigue, fever, myalgias or headaches.               She reports no joint swelling. Pertinent negatives include no dysuria, fatigue, fever, myalgias or headaches.         Review of Systems   Constitutional: Negative for chills, fatigue and fever.   HENT: Negative for mouth sores, rhinorrhea and sore throat.    Eyes: Negative for pain and redness.   Respiratory: Negative for cough and shortness of breath.    Cardiovascular:  "Negative for chest pain.   Gastrointestinal: Negative for abdominal pain, constipation, diarrhea, nausea and vomiting.   Genitourinary: Negative for dysuria and hematuria.   Musculoskeletal: Negative for arthralgias, back pain, joint swelling, myalgias and neck pain.   Skin: Positive for rash.   Neurological: Negative for weakness, numbness and headaches.   Psychiatric/Behavioral: The patient is not nervous/anxious.          Objective:   BP (!) 141/88   Pulse 68   Ht 5' 7" (1.702 m)   Wt 70.3 kg (154 lb 15.7 oz)   BMI 24.27 kg/m²      Physical Exam   Constitutional: She is oriented to person, place, and time and well-developed, well-nourished, and in no distress.   HENT:   Head: Normocephalic and atraumatic.   Eyes: Pupils are equal, round, and reactive to light. Right eye exhibits no discharge.   Neck: Normal range of motion.   Cardiovascular: Normal rate, regular rhythm and normal heart sounds.  Exam reveals no friction rub.    Pulmonary/Chest: Effort normal and breath sounds normal. No respiratory distress.   Abdominal: Soft. She exhibits no distension. There is no abdominal tenderness.   Lymphadenopathy:     She has no cervical adenopathy.   Neurological: She is alert and oriented to person, place, and time.   Skin: Rash noted. No erythema.          No psoriasis rash   Psychiatric: Mood normal.   Musculoskeletal: Normal range of motion. No deformity or edema.      Comments: liya hands with oa changes to her pip and dip joints, no synovitis, no dactylitis noted.      Full rom to liya wrists, mcps, pips    Full rom liya knees          No results found for this or any previous visit (from the past 1008 hour(s)).         Ref. Range 1/9/2020 07:58   Hep B C IgM Latest Ref Range: Negative  Negative   Hep B Core Total Ab Latest Ref Range: Negative  Negative   Hepatitis B Surface Ag Latest Ref Range: Negative  Negative   Quantiferon Mitogen Value Latest Units: IU/mL >10.00   Quantiferon Nil Value Latest Units: IU/mL " 0.06   Quantiferon TB Gold (Incubated) Unknown Negative    1/9/20  QuantiFERON TB1 Ag Value IU/mL 0.06    QuantiFERON TB2 Ag Value IU/mL 0.05    Quantiferon Nil Value IU/mL 0.06    Quantiferon Mitogen Value IU/mL >10.00    QuantiFERON-TB Gold Plus Negative Negative                Results for orders placed in visit on 02/12/19   DXA Bone Density Spine And Hip    Narrative EXAMINATION:  DEXA BONE DENSITY SPINE HIP    CLINICAL HISTORY:  OP; Menopausal and female climacteric states    TECHNIQUE:  DXA scanning was performed over the left hip and lumbar spine.  Review of the images confirms satisfactory positioning and technique.    COMPARISON:  None    FINDINGS:  The L1 to L4 vertebral bone mineral density is equal to 1.010 g/cm squared with a T score of -1.5.    The left femoral neck bone mineral density is equal to 0.714 g/cm squared with a T score of -2.3.    There is a 11.7% risk of a major osteoporotic fracture and a 2.2% risk of hip fracture in the next 10 years (FRAX).      Impression Osteopenia    Consider FDA approved medical therapies in postmenopausal women and men aged 50 years and older, based on the following:    *A hip or vertebral (clinical or morphometric) fracture  *T score less than or equal to -2.5 at the femoral neck or spine after appropriate evaluation to exclude secondary causes.  *Low bone mass -- also known as osteopenia (T score between -1.0 and -2.5 at the femoral neck or spine) and a 10 year probability of hip fracture greater than or equal to 3% or a 10 year probability of major osteoporosis-related fracture greater than or equal to 20% based on the [place]-adapted WHO algorithm.  *Clinician's judgment and/or patient preference may indicate treatment for people with 10 year fracture probabilities is above or below these levels.      Electronically signed by: Catarino Lo MD  Date:    02/12/2019  Time:    13:47         Ct chest 6-14-16: Previously demonstrated tiny pulmonary nodules are not  identified. There are no new nodules. There are one or 2 tiny nodules at the lateral left lung base. No pulmonary infiltrates or pleural effusions. No abnormality of the airway. No evidence of pathologic hilar or mediastinal lymphadenopathy     Assessment:       1. Psoriatic arthritis    2. Psoriasis    3. Immunocompromised    4. Encounter for long-term (current) use of high-risk medication        Impression:    Psoriatic arthritis with h/o psoriasis:  Well controlled on humira 40mg every other week and arava 20mg 3 X week and prn celebrex 200mg/d;     failed mtx due to development of ILD/lung nodules;     Mild skin rash <1% BSA   No enthesitis, dactylitis or synovitis  No Axial involvement     NOWAK-28 (CRP): --  CDAI 0  GURJIT 0    Medication monitoring: no signs of toxicity, need to review labwork, get from [facility]    Immunocompromised:  utd except zoster/shingrix, she is declining the vaccine at this time  TB gold and Hep studies UTD 1/2020    Lung disease/lung nodules: thought due to mtx, pulm following, this is stable    DJD spine, neck back: ok today, with radicular symptoms liya legs/neuropathy, stable, celebrex helps; off gabapentin made her drowsy, takes tumeric daily         Osteopenia reviewd today; normal bmd at spine L1-4, Osteopenia at hip both left femur neck and total femur mean  Lowest T score at left FN -2.3  Mod rsk fx: 11.7% christy, 2.2% hip    Vit D def- level 8: on supplemental vit D3 otc not sure what dose       Neuropathy and Chronic foot pain; djd spine- Cervical and Lumbar  Failed gabapentin   Plan:         Continue humira 40mg every other week and c/w arava 3X per week  Topical kenalog to rash bid    Add  vit D3 at least 5000 IU per day and check level next visit   Ca in diet  Watch Sodium and Potassium intake       Osteopenia with mod risk fx- Repeat dexa in 2 years (2/2020); treat if indicated then but for now no need to treat  Later this year     Cont f/u with pulm for ILD    C/w  Cymbalta 30  mg/d- can increase if needed     Return in 4 mos with labwork - reg 4 and vit D     Please call or send portal message with any questions or concerns

## 2020-08-17 ENCOUNTER — OFFICE VISIT (OUTPATIENT)
Dept: URGENT CARE | Facility: CLINIC | Age: 65
End: 2020-08-17
Payer: MEDICARE

## 2020-08-17 ENCOUNTER — NURSE TRIAGE (OUTPATIENT)
Dept: ADMINISTRATIVE | Facility: CLINIC | Age: 65
End: 2020-08-17

## 2020-08-17 VITALS
BODY MASS INDEX: 23.65 KG/M2 | SYSTOLIC BLOOD PRESSURE: 130 MMHG | OXYGEN SATURATION: 98 % | DIASTOLIC BLOOD PRESSURE: 83 MMHG | HEART RATE: 76 BPM | WEIGHT: 151 LBS | TEMPERATURE: 98 F

## 2020-08-17 DIAGNOSIS — R05.9 COUGH: ICD-10-CM

## 2020-08-17 DIAGNOSIS — B34.9 VIRAL ILLNESS: Primary | ICD-10-CM

## 2020-08-17 PROCEDURE — 99213 OFFICE O/P EST LOW 20 MIN: CPT | Mod: S$GLB,,, | Performed by: NURSE PRACTITIONER

## 2020-08-17 PROCEDURE — 99213 PR OFFICE/OUTPT VISIT, EST, LEVL III, 20-29 MIN: ICD-10-PCS | Mod: S$GLB,,, | Performed by: NURSE PRACTITIONER

## 2020-08-17 PROCEDURE — U0003 INFECTIOUS AGENT DETECTION BY NUCLEIC ACID (DNA OR RNA); SEVERE ACUTE RESPIRATORY SYNDROME CORONAVIRUS 2 (SARS-COV-2) (CORONAVIRUS DISEASE [COVID-19]), AMPLIFIED PROBE TECHNIQUE, MAKING USE OF HIGH THROUGHPUT TECHNOLOGIES AS DESCRIBED BY CMS-2020-01-R: HCPCS

## 2020-08-17 NOTE — TELEPHONE ENCOUNTER
Caller states  has tested + to Covid, results received yesterday. Caller c/o fatigue, loss of smell, taste, diarrhea.  Hx auto-immune disease.  Would like to be tested.  Provided info for UC in [place].

## 2020-08-17 NOTE — PATIENT INSTRUCTIONS
Instructions for Patients Awaiting COVID-19 Test Results    You will either be called with your test result or it will be released to the patient portal.  If you have any questions about your test, please visit [email or web address] or call our COVID-19 information line at [phone].    Prevention steps for patients with confirmed or suspected COVID-19       Stay home and stay away from family members and friends. The CDC says, you can leave home after these three things have happened: 1) You have had no fever for at least 72 hours (that is three full days of no fever without the use of medicine that reduces fevers) 2) AND other symptoms have improved (for example, when your cough or shortness of breath have improved) 3) AND at least 7 days have passed since your symptoms first appeared.   Separate yourself from other people and animals in your home.   Call ahead before visiting your doctor.   Wear a facemask.   Cover your coughs and sneezes.   Wash your hands often with soap and water; hand  can be used, too.   Avoid sharing personal household items.   Wipe down surfaces used daily.   Monitor your symptoms. Seek prompt medical attention if your illness is worsening (e.g., difficulty breathing).    Before seeking care, call your healthcare provider.   If you have a medical emergency and need to call 911, notify the dispatch personnel that you have, or are being evaluated for COVID-19. If possible, put on a facemask before emergency medical services arrive.        Recommended precautions for household members, intimate partners, and caregivers in a home setting of a patient with symptomatic laboratory-confirmed COVID-19 or a patient under investigation.  Household members, intimate partners, and caregivers in the home setting awaiting tests results have close contact with a person with symptomatic, laboratory-confirmed COVID-19 or a person under investigation. Close contacts should  monitor their health; they should call their provider right away if they develop symptoms suggestive of COVID-19 (e.g., fever, cough, shortness of breath).    Close contacts should also follow these recommendations:   Make sure that you understand and can help the patient follow their provider's instructions for medication(s) and care. You should help the patient with basic needs in the home and provide support for getting groceries, prescriptions, and other personal needs.   Monitor the patient's symptoms. If the patient is getting sicker, call his or her healthcare provider and tell them that the patient has laboratory-confirmed COVID-19. If the patient has a medical emergency and you need to call 911, notify the dispatch personnel that the patient has, or is being evaluated for COVID-19.   Household members should stay in another room or be  from the patient. Household members should use a separate bedroom and bathroom, if available.   Prohibit visitors.   Household members should care for any pets in the home.   Make sure that shared spaces in the home have good air flow, such as by an air conditioner or an opened window, weather permitting.   Perform hand hygiene frequently. Wash your hands often with soap and water for at least 20 seconds or use an alcohol-based hand  (that contains > 60% alcohol) covering all surfaces of your hands and rubbing them together until they feel dry. Soap and water should be used preferentially.   Avoid touching your eyes, nose, and mouth.   The patient should wear a facemask. If the patient is not able to wear a facemask (for example, because it causes trouble breathing), caregivers should wear a mask when they are in the same room as the patient.   Wear a disposable facemask and gloves when you touch or have contact with the patient's blood, stool, or body fluids, such as saliva, sputum, nasal mucus, vomit, urine.  o Throw out disposable facemasks and  gloves after using them. Do not reuse.  o When removing personal protective equipment, first remove and dispose of gloves. Then, immediately clean your hands with soap and water or alcohol-based hand . Next, remove and dispose of facemask, and immediately clean your hands again with soap and water or alcohol-based hand .   You should not share dishes, drinking glasses, cups, eating utensils, towels, bedding, or other items with the patient. After the patient uses these items, you should wash them thoroughly (see below Wash laundry thoroughly).   Clean all high-touch surfaces, such as counters, tabletops, doorknobs, bathroom fixtures, toilets, phones, keyboards, tablets, and bedside tables, every day. Also, clean any surfaces that may have blood, stool, or body fluids on them.   Use a household cleaning spray or wipe, according to the label instructions. Labels contain instructions for safe and effective use of the cleaning product including precautions you should take when applying the product, such as wearing gloves and making sure you have good ventilation during use of the product.   Wash laundry thoroughly.  o Immediately remove and wash clothes or bedding that have blood, stool, or body fluids on them.  o Wear disposable gloves while handling soiled items and keep soiled items away from your body. Clean your hands (with soap and water or an alcohol-based hand ) immediately after removing your gloves.  o Read and follow directions on labels of laundry or clothing items and detergent. In general, using a normal laundry detergent according to washing machine instructions and dry thoroughly using the warmest temperatures recommended on the clothing label.   Place all used disposable gloves, facemasks, and other contaminated items in a lined container before disposing of them with other household waste. Clean your hands (with soap and water or an alcohol-based hand )  immediately after handling these items. Soap and water should be used preferentially if hands are visibly dirty.   Discuss any additional questions with your state or local health department or healthcare provider. Check available hours when contacting your local health department.    For more information see CDC link below.      https://www.cdc.gov/coronavirus/2019-ncov/hcp/guidance-prevent-spread.html#precautions        Sources:  [facility], [place] Department of [facility] and [facility]

## 2020-08-17 NOTE — TELEPHONE ENCOUNTER
Reason for Disposition   [1] Adult with possible COVID-19 symptoms AND [2] triager concerned about severity of symptoms or other causes   HIGH RISK patient (e.g., age > 64 years, diabetes, heart or lung disease, weak immune system)   COVID-19 Home Isolation, questions about   COVID-19 Testing, questions about    Additional Information   Negative: SEVERE difficulty breathing (e.g., struggling for each breath, speaks in single words)   Negative: Difficult to awaken or acting confused (e.g., disoriented, slurred speech)   Negative: Bluish (or gray) lips or face now   Negative: Shock suspected (e.g., cold/pale/clammy skin, too weak to stand, low BP, rapid pulse)   Negative: Sounds like a life-threatening emergency to the triager   Negative: [1] COVID-19 exposure AND [2] NO symptoms   Negative: COVID-19 and Breastfeeding, questions about   Negative: SEVERE or constant chest pain or pressure (Exception: mild central chest pain, present only when coughing)   Negative: MODERATE difficulty breathing (e.g., speaks in phrases, SOB even at rest, pulse 100-120)   Negative: MILD difficulty breathing (e.g., minimal/no SOB at rest, SOB with walking, pulse <100)   Negative: Chest pain   Negative: Patient sounds very sick or weak to the triager   Negative: Fever > 103 F (39.4 C)   Negative: [1] Fever > 101 F (38.3 C) AND [2] age > 60   Negative: [1] Fever > 100.0 F (37.8 C) AND [2] bedridden (e.g., nursing home patient, CVA, chronic illness, recovering from surgery)   Negative: [1] COVID-19 infection suspected by caller or triager AND [2] mild symptoms (cough, fever, or others) AND [3] no complications or SOB   Negative: Fever present > 3 days (72 hours)   Negative: [1] Fever returns after gone for over 24 hours AND [2] symptoms worse or not improved   Negative: [1] Continuous (nonstop) coughing interferes with work or school AND [2] no improvement using cough treatment per protocol   Negative: Cough present  > 3 weeks   Negative: COVID-19, questions about   Negative: COVID-19 Prevention and Healthy Living, questions about   Negative: [1] COVID-19 diagnosed by positive lab test AND [2] mild symptoms (e.g., cough, fever, others) AND [3] no complications or SOB   Negative: [1] COVID-19 diagnosed by HCP (doctor, NP or PA) AND [2] mild symptoms (e.g., cough, fever, others) AND [3] no complications or SOB    Protocols used: CORONAVIRUS (COVID-19) DIAGNOSED OR [id number]

## 2020-08-17 NOTE — PROGRESS NOTES
Subjective:       Patient ID: Maricarmen Becerril is a 65 y.o. female.    Vitals:  weight is 68.5 kg (151 lb). Her tympanic temperature is 98.4 °F (36.9 °C). Her blood pressure is 130/83 and her pulse is 76. Her oxygen saturation is 98%.     Chief Complaint: COVID-19 Concerns    URI   This is a new problem. The current episode started in the past 7 days. The problem has been gradually worsening. There has been no fever. Associated symptoms include diarrhea. Pertinent negatives include no congestion, coughing, ear pain, nausea, rash, sinus pain, sore throat, vomiting or wheezing. She has tried nothing for the symptoms. The treatment provided no relief.    tested positive for COVID 2 days ago.     Constitution: Positive for appetite change. Negative for chills, sweating, fatigue and fever.   HENT: Positive for postnasal drip and sinus pressure. Negative for ear pain, congestion, sinus pain, sore throat and voice change.    Neck: Negative for painful lymph nodes.   Eyes: Negative for eye redness.   Respiratory: Negative for chest tightness, cough, sputum production, bloody sputum, COPD, shortness of breath, stridor, wheezing and asthma.    Gastrointestinal: Positive for diarrhea. Negative for nausea and vomiting.   Musculoskeletal: Negative for muscle ache.   Skin: Negative for rash.   Allergic/Immunologic: Negative for seasonal allergies and asthma.   Hematologic/Lymphatic: Negative for swollen lymph nodes.       Objective:      Physical Exam   Constitutional: She is oriented to person, place, and time.   HENT:   Head: Normocephalic and atraumatic.   Neck: Normal range of motion.   Cardiovascular: Normal rate.   Pulmonary/Chest: Effort normal. No respiratory distress.   Abdominal: Normal appearance.   Musculoskeletal: Normal range of motion.   Neurological: She is alert and oriented to person, place, and time.   Skin: Skin is warm and dry. Psychiatric: Her behavior is normal. Mood normal.   Nursing note  and vitals reviewed.        Assessment:       1. Viral illness    2. Cough        Plan:         Viral illness    Cough  -     COVID-19 Routine Screening         COVID precautions  If symptoms worsen or fail to improve, follow-up with primary care doctor or nearest ER. After visit summary given and discussed. Patient verbalized understanding and agrees with treatment plan. Patient remained stable and was discharged in no acute distress.

## 2020-08-19 ENCOUNTER — TELEPHONE (OUTPATIENT)
Dept: URGENT CARE | Facility: CLINIC | Age: 65
End: 2020-08-19

## 2020-08-19 LAB — SARS-COV-2 RNA RESP QL NAA+PROBE: DETECTED

## 2020-08-20 NOTE — TELEPHONE ENCOUNTER
Spoke with patient, obtained pt identifiers, and informed her of positive Covid-19 test result after obtaining pt identifiers. Advised to continue with 10 day quarantine, wearing a mask, otc symptom management, and sanitizing home and hands. Report to ER for any difficulty breathing. Pt verbalized understanding.

## 2020-09-19 RX ORDER — LEVOTHYROXINE SODIUM 75 UG/1
TABLET ORAL
Qty: 30 TABLET | Refills: 0 | Status: SHIPPED | OUTPATIENT
Start: 2020-09-19 | End: 2020-10-28

## 2020-10-05 ENCOUNTER — PATIENT MESSAGE (OUTPATIENT)
Dept: ADMINISTRATIVE | Facility: HOSPITAL | Age: 65
End: 2020-10-05

## 2020-10-12 ENCOUNTER — TELEPHONE (OUTPATIENT)
Dept: RHEUMATOLOGY | Facility: CLINIC | Age: 65
End: 2020-10-12

## 2020-10-12 ENCOUNTER — IMMUNIZATION (OUTPATIENT)
Dept: INTERNAL MEDICINE | Facility: CLINIC | Age: 65
End: 2020-10-12
Payer: MEDICARE

## 2020-10-12 DIAGNOSIS — L40.50 PSORIATIC ARTHRITIS: Primary | ICD-10-CM

## 2020-10-12 DIAGNOSIS — R79.89 LOW VITAMIN D LEVEL: ICD-10-CM

## 2020-10-12 PROCEDURE — 99999 PR PBB SHADOW E&M-EST. PATIENT-LVL I: CPT | Mod: PBBFAC,,,

## 2020-10-12 PROCEDURE — 90694 VACC AIIV4 NO PRSRV 0.5ML IM: CPT | Mod: PBBFAC

## 2020-10-12 PROCEDURE — 99999 PR PBB SHADOW E&M-EST. PATIENT-LVL I: ICD-10-PCS | Mod: PBBFAC,,,

## 2020-10-12 PROCEDURE — G0008 ADMIN INFLUENZA VIRUS VAC: HCPCS | Mod: PBBFAC

## 2020-10-12 PROCEDURE — 99211 OFF/OP EST MAY X REQ PHY/QHP: CPT | Mod: PBBFAC,25

## 2020-10-12 NOTE — TELEPHONE ENCOUNTER
Dr. Colon can you a please add external orders for Vitamin D order.    I am unable to do so due to it not being on the Work order guidelines.     Ms. Becerril came in asking for lab orders so she could bring to [facility]. I informed Ms. Becerril that Dr. Gu is out of the office at this time and another doctor is currently in with a patient and I am unaware for when the Vitamin D order could be placed and signed by a provider. Ms. Becerril stated that she did not want to wait and provided this nurse with the fax number for [facility] on [employer].     I have attached the reg 4 labs.

## 2020-10-12 NOTE — TELEPHONE ENCOUNTER
Dr. Gu can you place EXTERNAL Vitamin D orders for MsJacqui Jone. To be faxed to [facility] [phone]

## 2020-10-28 LAB
25(OH)D3+25(OH)D2 SERPL-MCNC: 41.8 NG/ML (ref 30–100)
ALBUMIN SERPL-MCNC: 4.4 G/DL (ref 3.8–4.8)
ALBUMIN/GLOB SERPL: 1.6 {RATIO} (ref 1.2–2.2)
ALP SERPL-CCNC: 63 IU/L (ref 39–117)
ALT SERPL-CCNC: 22 IU/L (ref 0–32)
AST SERPL-CCNC: 27 IU/L (ref 0–40)
BASOPHILS # BLD AUTO: 0 X10E3/UL (ref 0–0.2)
BASOPHILS NFR BLD AUTO: 1 %
BILIRUB SERPL-MCNC: 0.6 MG/DL (ref 0–1.2)
BUN SERPL-MCNC: 15 MG/DL (ref 8–27)
BUN/CREAT SERPL: 24 (ref 12–28)
CALCIUM SERPL-MCNC: 9.9 MG/DL (ref 8.7–10.3)
CHLORIDE SERPL-SCNC: 105 MMOL/L (ref 96–106)
CO2 SERPL-SCNC: 24 MMOL/L (ref 20–29)
CREAT SERPL-MCNC: 0.62 MG/DL (ref 0.57–1)
CRP SERPL-MCNC: <1 MG/L (ref 0–10)
EOSINOPHIL # BLD AUTO: 0.5 X10E3/UL (ref 0–0.4)
EOSINOPHIL NFR BLD AUTO: 13 %
ERYTHROCYTE [DISTWIDTH] IN BLOOD BY AUTOMATED COUNT: 12.9 % (ref 11.7–15.4)
ERYTHROCYTE [SEDIMENTATION RATE] IN BLOOD BY WESTERGREN METHOD: 21 MM/HR (ref 0–40)
GLOBULIN SER CALC-MCNC: 2.8 G/DL (ref 1.5–4.5)
GLUCOSE SERPL-MCNC: 91 MG/DL (ref 65–99)
HCT VFR BLD AUTO: 38 % (ref 34–46.6)
HGB BLD-MCNC: 12.8 G/DL (ref 11.1–15.9)
IMM GRANULOCYTES # BLD AUTO: 0 X10E3/UL (ref 0–0.1)
IMM GRANULOCYTES NFR BLD AUTO: 0 %
LYMPHOCYTES # BLD AUTO: 1.8 X10E3/UL (ref 0.7–3.1)
LYMPHOCYTES NFR BLD AUTO: 42 %
MCH RBC QN AUTO: 31.4 PG (ref 26.6–33)
MCHC RBC AUTO-ENTMCNC: 33.7 G/DL (ref 31.5–35.7)
MCV RBC AUTO: 93 FL (ref 79–97)
MONOCYTES # BLD AUTO: 0.5 X10E3/UL (ref 0.1–0.9)
MONOCYTES NFR BLD AUTO: 11 %
NEUTROPHILS # BLD AUTO: 1.4 X10E3/UL (ref 1.4–7)
NEUTROPHILS NFR BLD AUTO: 33 %
PLATELET # BLD AUTO: 262 X10E3/UL (ref 150–450)
POTASSIUM SERPL-SCNC: 4.4 MMOL/L (ref 3.5–5.2)
PROT SERPL-MCNC: 7.2 G/DL (ref 6–8.5)
RBC # BLD AUTO: 4.07 X10E6/UL (ref 3.77–5.28)
SODIUM SERPL-SCNC: 144 MMOL/L (ref 134–144)
SPECIMEN STATUS REPORT: NORMAL
WBC # BLD AUTO: 4.2 X10E3/UL (ref 3.4–10.8)

## 2020-11-02 ENCOUNTER — DOCUMENTATION ONLY (OUTPATIENT)
Dept: RHEUMATOLOGY | Facility: CLINIC | Age: 65
End: 2020-11-02

## 2020-11-02 ENCOUNTER — PATIENT OUTREACH (OUTPATIENT)
Dept: ADMINISTRATIVE | Facility: OTHER | Age: 65
End: 2020-11-02

## 2020-11-02 ENCOUNTER — LAB VISIT (OUTPATIENT)
Dept: LAB | Facility: HOSPITAL | Age: 65
End: 2020-11-02
Attending: PHYSICIAN ASSISTANT
Payer: MEDICARE

## 2020-11-02 ENCOUNTER — TELEPHONE (OUTPATIENT)
Dept: RHEUMATOLOGY | Facility: CLINIC | Age: 65
End: 2020-11-02

## 2020-11-02 ENCOUNTER — OFFICE VISIT (OUTPATIENT)
Dept: RHEUMATOLOGY | Facility: CLINIC | Age: 65
End: 2020-11-02
Payer: MEDICARE

## 2020-11-02 DIAGNOSIS — L40.50 PSORIATIC ARTHRITIS: Primary | ICD-10-CM

## 2020-11-02 DIAGNOSIS — D84.9 IMMUNOCOMPROMISED: ICD-10-CM

## 2020-11-02 DIAGNOSIS — L40.9 PSORIASIS: ICD-10-CM

## 2020-11-02 DIAGNOSIS — Z79.899 ENCOUNTER FOR LONG-TERM (CURRENT) USE OF HIGH-RISK MEDICATION: ICD-10-CM

## 2020-11-02 DIAGNOSIS — L40.50 PSORIATIC ARTHRITIS: ICD-10-CM

## 2020-11-02 DIAGNOSIS — R79.89 LOW VITAMIN D LEVEL: ICD-10-CM

## 2020-11-02 LAB
ALBUMIN SERPL BCP-MCNC: 3.8 G/DL (ref 3.5–5.2)
ALP SERPL-CCNC: 66 U/L (ref 55–135)
ALT SERPL W/O P-5'-P-CCNC: 34 U/L (ref 10–44)
ANION GAP SERPL CALC-SCNC: 6 MMOL/L (ref 8–16)
AST SERPL-CCNC: 32 U/L (ref 10–40)
BASOPHILS # BLD AUTO: 0.06 K/UL (ref 0–0.2)
BASOPHILS NFR BLD: 1.4 % (ref 0–1.9)
BILIRUB SERPL-MCNC: 0.5 MG/DL (ref 0.1–1)
BUN SERPL-MCNC: 14 MG/DL (ref 8–23)
CALCIUM SERPL-MCNC: 9.9 MG/DL (ref 8.7–10.5)
CHLORIDE SERPL-SCNC: 105 MMOL/L (ref 95–110)
CO2 SERPL-SCNC: 28 MMOL/L (ref 23–29)
CREAT SERPL-MCNC: 0.7 MG/DL (ref 0.5–1.4)
CRP SERPL-MCNC: 0.3 MG/L (ref 0–8.2)
DIFFERENTIAL METHOD: ABNORMAL
EOSINOPHIL # BLD AUTO: 0.6 K/UL (ref 0–0.5)
EOSINOPHIL NFR BLD: 13 % (ref 0–8)
ERYTHROCYTE [DISTWIDTH] IN BLOOD BY AUTOMATED COUNT: 13.8 % (ref 11.5–14.5)
ERYTHROCYTE [SEDIMENTATION RATE] IN BLOOD BY WESTERGREN METHOD: 8 MM/HR (ref 0–20)
EST. GFR  (AFRICAN AMERICAN): >60 ML/MIN/1.73 M^2
EST. GFR  (NON AFRICAN AMERICAN): >60 ML/MIN/1.73 M^2
GLUCOSE SERPL-MCNC: 87 MG/DL (ref 70–110)
HCT VFR BLD AUTO: 44.1 % (ref 37–48.5)
HGB BLD-MCNC: 13.5 G/DL (ref 12–16)
IMM GRANULOCYTES # BLD AUTO: 0.01 K/UL (ref 0–0.04)
IMM GRANULOCYTES NFR BLD AUTO: 0.2 % (ref 0–0.5)
LYMPHOCYTES # BLD AUTO: 1.8 K/UL (ref 1–4.8)
LYMPHOCYTES NFR BLD: 41.5 % (ref 18–48)
MCH RBC QN AUTO: 30.2 PG (ref 27–31)
MCHC RBC AUTO-ENTMCNC: 30.6 G/DL (ref 32–36)
MCV RBC AUTO: 99 FL (ref 82–98)
MONOCYTES # BLD AUTO: 0.4 K/UL (ref 0.3–1)
MONOCYTES NFR BLD: 10.4 % (ref 4–15)
NEUTROPHILS # BLD AUTO: 1.4 K/UL (ref 1.8–7.7)
NEUTROPHILS NFR BLD: 33.5 % (ref 38–73)
NRBC BLD-RTO: 0 /100 WBC
PLATELET # BLD AUTO: 262 K/UL (ref 150–350)
PMV BLD AUTO: 10.9 FL (ref 9.2–12.9)
POTASSIUM SERPL-SCNC: 4.5 MMOL/L (ref 3.5–5.1)
PROT SERPL-MCNC: 7.5 G/DL (ref 6–8.4)
RBC # BLD AUTO: 4.47 M/UL (ref 4–5.4)
SODIUM SERPL-SCNC: 139 MMOL/L (ref 136–145)
WBC # BLD AUTO: 4.22 K/UL (ref 3.9–12.7)

## 2020-11-02 PROCEDURE — 82306 VITAMIN D 25 HYDROXY: CPT

## 2020-11-02 PROCEDURE — 36415 COLL VENOUS BLD VENIPUNCTURE: CPT | Mod: PO

## 2020-11-02 PROCEDURE — 85651 RBC SED RATE NONAUTOMATED: CPT

## 2020-11-02 PROCEDURE — 99214 PR OFFICE/OUTPT VISIT, EST, LEVL IV, 30-39 MIN: ICD-10-PCS | Mod: 95,,, | Performed by: PHYSICIAN ASSISTANT

## 2020-11-02 PROCEDURE — 86140 C-REACTIVE PROTEIN: CPT

## 2020-11-02 PROCEDURE — 80053 COMPREHEN METABOLIC PANEL: CPT

## 2020-11-02 PROCEDURE — 99214 OFFICE O/P EST MOD 30 MIN: CPT | Mod: 95,,, | Performed by: PHYSICIAN ASSISTANT

## 2020-11-02 PROCEDURE — 85025 COMPLETE CBC W/AUTO DIFF WBC: CPT

## 2020-11-02 NOTE — PROGRESS NOTES
Subjective:       Patient ID: Maricarmen Becerril is a 65 y.o. female.    Chief Complaint: PsA, psoriasis      The patient location is: [facility]  The chief complaint leading to consultation is: PsO/PsA med and condition monitoring     Visit type: audiovisual    Face to Face time with patient: 16 min  26 minutes of total time spent on the encounter, which includes face to face time and non-face to face time preparing to see the patient (eg, review of tests), Obtaining and/or reviewing separately obtained history, Documenting clinical information in the electronic or other health record, Independently interpreting results (not separately reported) and communicating results to the patient/family/caregiver, or Care coordination (not separately reported).         Each patient to whom he or she provides medical services by telemedicine is:  (1) informed of the relationship between the physician and patient and the respective role of any other health care provider with respect to management of the patient; and (2) notified that he or she may decline to receive medical services by telemedicine and may withdraw from such care at any time.    Notes:Maricarmen has  psoriatic arthritis with psoriasis.      She in the past has been on mtx but this was discontinued due to development of ILD and lung nodules.  She is currently on Arava 20mg mon/wed/fri and humira 40mg every other week     yrly flu shot done     Recent mild skin flare up   Had topical to use  This is better now  skin clear    Some lower back sciatica issues worse in the morning then resolves  Only mild  Her joint pain is 0/10; mild LBP 2/10    Some intermittent diarrhea  Due for colonoscopy  IBD vs arava as cause      She has chronic neck and back pain due to degenerative arthritis, bulging discs (mri l spine in 2013 with disc disease, deg arthritis). This comes and goes; no issues currently   Some numbness and aching in her feet for many years.      Celebrex only prn now;  Off gabapentin, bipin her sleepy.    Now on cymbalta 30 mg once a day for neuropathy and spine pain     She sees pulmonary for her lungs and her last CT was stable.     DEXA done 2/13/2020- no prior dexa on file (looked back to legacy documents)- pt denies ever having one done,    Vit D3 5000 IU per day   Vit D level was 8 now up to 41   Ca intake in diet is good   Renal function and Ca level both wnl on labs 10/27/20        Psoriasis  Pertinent negatives include no abdominal pain, arthralgias, chest pain, chills, coughing, fatigue, fever, headaches, joint swelling, myalgias, nausea, neck pain, numbness, rash, sore throat, vomiting or weakness.           She reports no joint swelling. Pertinent negatives include no dysuria, fatigue, fever, myalgias or headaches.               She reports no joint swelling. Pertinent negatives include no dysuria, fatigue, fever, myalgias or headaches.         Review of Systems   Constitutional: Negative for chills, fatigue and fever.   HENT: Negative for mouth sores, rhinorrhea and sore throat.    Eyes: Negative for pain and redness.   Respiratory: Negative for cough and shortness of breath.    Cardiovascular: Negative for chest pain.   Gastrointestinal: Positive for diarrhea. Negative for abdominal pain, constipation, nausea and vomiting.   Genitourinary: Negative for dysuria and hematuria.   Musculoskeletal: Negative for arthralgias, back pain, joint swelling, myalgias and neck pain.   Skin: Negative for rash.   Neurological: Negative for weakness, numbness and headaches.   Psychiatric/Behavioral: The patient is not nervous/anxious.          Objective:   There were no vitals taken for this visit.     Physical Exam   Constitutional: She is well-developed, well-nourished, and in no distress. No distress.   Pulmonary/Chest: Effort normal. No respiratory distress.   Skin: No rash noted.     No facial, upper chest or upper ext rash today    Musculoskeletal:       Comments: No obvious  synovitis or dactylitis noted          Recent Results (from the past 1008 hour(s))   CBC/Diff Ambigious Default    Collection Time: 10/27/20 12:10 PM   Result Value Ref Range    WBC 4.2 3.4 - 10.8 x10E3/uL    RBC 4.07 3.77 - 5.28 x10E6/uL    Hemoglobin 12.8 11.1 - 15.9 g/dL    Hematocrit 38.0 34.0 - 46.6 %    MCV 93 79 - 97 fL    MCH 31.4 26.6 - 33.0 pg    MCHC 33.7 31.5 - 35.7 g/dL    RDW 12.9 11.7 - 15.4 %    Platelets 262 150 - 450 x10E3/uL    Neutrophils 33 Not Estab. %    Lymphs 42 Not Estab. %    Monocytes 11 Not Estab. %    Eos 13 Not Estab. %    Basos 1 Not Estab. %    Neutrophils (Absolute) 1.4 1.4 - 7.0 x10E3/uL    Lymphs (Absolute) 1.8 0.7 - 3.1 x10E3/uL    Monocytes(Absolute) 0.5 0.1 - 0.9 x10E3/uL    Eos (Absolute) 0.5 (H) 0.0 - 0.4 x10E3/uL    Baso (Absolute) 0.0 0.0 - 0.2 x10E3/uL    Immature Granulocytes 0 Not Estab. %    Immature Grans (Abs) 0.0 0.0 - 0.1 x10E3/uL   Comprehensive Metabolic Panel    Collection Time: 10/27/20 12:10 PM   Result Value Ref Range    Glucose 91 65 - 99 mg/dL    BUN 15 8 - 27 mg/dL    Creatinine 0.62 0.57 - 1.00 mg/dL    eGFR if non African American 95 >59 mL/min/1.73    eGFR if African American 109 >59 mL/min/1.73    BUN/Creatinine Ratio 24 12 - 28    Sodium 144 134 - 144 mmol/L    Potassium 4.4 3.5 - 5.2 mmol/L    Chloride 105 96 - 106 mmol/L    CO2 24 20 - 29 mmol/L    Calcium 9.9 8.7 - 10.3 mg/dL    Protein, Total 7.2 6.0 - 8.5 g/dL    Albumin 4.4 3.8 - 4.8 g/dL    Globulin, Total 2.8 1.5 - 4.5 g/dL    Albumin/Globulin Ratio 1.6 1.2 - 2.2    Total Bilirubin 0.6 0.0 - 1.2 mg/dL    Alkaline Phosphatase 63 39 - 117 IU/L    AST 27 0 - 40 IU/L    ALT 22 0 - 32 IU/L   Vitamin D    Collection Time: 10/27/20 12:10 PM   Result Value Ref Range    Vit D, 25-Hydroxy 41.8 30.0 - 100.0 ng/mL   Sedimentation Rate    Collection Time: 10/27/20 12:10 PM   Result Value Ref Range    Sed Rate 21 0 - 40 mm/hr   C-Reactive Protein    Collection Time: 10/27/20 12:10  PM   Result Value Ref Range    CRP <1 0 - 10 mg/L   Specimen Status Report    Collection Time: 10/27/20 12:10 PM   Result Value Ref Range    Specimen Status Report Comment    Comprehensive metabolic panel    Collection Time: 11/02/20  8:30 AM   Result Value Ref Range    Sodium 139 136 - 145 mmol/L    Potassium 4.5 3.5 - 5.1 mmol/L    Chloride 105 95 - 110 mmol/L    CO2 28 23 - 29 mmol/L    Glucose 87 70 - 110 mg/dL    BUN 14 8 - 23 mg/dL    Creatinine 0.7 0.5 - 1.4 mg/dL    Calcium 9.9 8.7 - 10.5 mg/dL    Total Protein 7.5 6.0 - 8.4 g/dL    Albumin 3.8 3.5 - 5.2 g/dL    Total Bilirubin 0.5 0.1 - 1.0 mg/dL    Alkaline Phosphatase 66 55 - 135 U/L    AST 32 10 - 40 U/L    ALT 34 10 - 44 U/L    Anion Gap 6 (L) 8 - 16 mmol/L    eGFR if African American >60 >60 mL/min/1.73 m^2    eGFR if non African American >60 >60 mL/min/1.73 m^2   CBC auto differential    Collection Time: 11/02/20  8:30 AM   Result Value Ref Range    WBC 4.22 3.90 - 12.70 K/uL    RBC 4.47 4.00 - 5.40 M/uL    Hemoglobin 13.5 12.0 - 16.0 g/dL    Hematocrit 44.1 37.0 - 48.5 %    MCV 99 (H) 82 - 98 fL    MCH 30.2 27.0 - 31.0 pg    MCHC 30.6 (L) 32.0 - 36.0 g/dL    RDW 13.8 11.5 - 14.5 %    Platelets 262 150 - 350 K/uL    MPV 10.9 9.2 - 12.9 fL    Immature Granulocytes 0.2 0.0 - 0.5 %    Gran # (ANC) 1.4 (L) 1.8 - 7.7 K/uL    Immature Grans (Abs) 0.01 0.00 - 0.04 K/uL    Lymph # 1.8 1.0 - 4.8 K/uL    Mono # 0.4 0.3 - 1.0 K/uL    Eos # 0.6 (H) 0.0 - 0.5 K/uL    Baso # 0.06 0.00 - 0.20 K/uL    nRBC 0 0 /100 WBC    Gran % 33.5 (L) 38.0 - 73.0 %    Lymph % 41.5 18.0 - 48.0 %    Mono % 10.4 4.0 - 15.0 %    Eosinophil % 13.0 (H) 0.0 - 8.0 %    Basophil % 1.4 0.0 - 1.9 %    Differential Method Automated    C-reactive protein    Collection Time: 11/02/20  8:30 AM   Result Value Ref Range    CRP 0.3 0.0 - 8.2 mg/L            Ref. Range 1/9/2020 07:58   Hep B C IgM Latest Ref Range: Negative  Negative   Hep B Core Total Ab Latest Ref Range: Negative  Negative    Hepatitis B Surface Ag Latest Ref Range: Negative  Negative   Quantiferon Mitogen Value Latest Units: IU/mL >10.00   Quantiferon Nil Value Latest Units: IU/mL 0.06   Quantiferon TB Gold (Incubated) Unknown Negative    1/9/20  QuantiFERON TB1 Ag Value IU/mL 0.06    QuantiFERON TB2 Ag Value IU/mL 0.05    Quantiferon Nil Value IU/mL 0.06    Quantiferon Mitogen Value IU/mL >10.00    QuantiFERON-TB Gold Plus Negative Negative                Results for orders placed in visit on 02/12/19   DXA Bone Density Spine And Hip    Narrative EXAMINATION:  DEXA BONE DENSITY SPINE HIP    CLINICAL HISTORY:  OP; Menopausal and female climacteric states    TECHNIQUE:  DXA scanning was performed over the left hip and lumbar spine.  Review of the images confirms satisfactory positioning and technique.    COMPARISON:  None    FINDINGS:  The L1 to L4 vertebral bone mineral density is equal to 1.010 g/cm squared with a T score of -1.5.    The left femoral neck bone mineral density is equal to 0.714 g/cm squared with a T score of -2.3.    There is a 11.7% risk of a major osteoporotic fracture and a 2.2% risk of hip fracture in the next 10 years (FRAX).      Impression Osteopenia    Consider FDA approved medical therapies in postmenopausal women and men aged 50 years and older, based on the following:    *A hip or vertebral (clinical or morphometric) fracture  *T score less than or equal to -2.5 at the femoral neck or spine after appropriate evaluation to exclude secondary causes.  *Low bone mass -- also known as osteopenia (T score between -1.0 and -2.5 at the femoral neck or spine) and a 10 year probability of hip fracture greater than or equal to 3% or a 10 year probability of major osteoporosis-related fracture greater than or equal to 20% based on the [place]-adapted WHO algorithm.  *Clinician's judgment and/or patient preference may indicate treatment for people with 10 year fracture probabilities is above or below these  levels.      Electronically signed by: Catarino Lo MD  Date:    02/12/2019  Time:    13:47         Ct chest 6-14-16: Previously demonstrated tiny pulmonary nodules are not identified. There are no new nodules. There are one or 2 tiny nodules at the lateral left lung base. No pulmonary infiltrates or pleural effusions. No abnormality of the airway. No evidence of pathologic hilar or mediastinal lymphadenopathy     Assessment:       1. Psoriatic arthritis    2. Psoriasis    3. Encounter for long-term (current) use of high-risk medication        Impression:    Psoriatic arthritis with h/o psoriasis: controlled on humira 40mg every other week and arava 20mg 3 X week and prn celebrex 200mg/d;     failed mtx due to development of ILD/lung nodules;     Mild skin rash <1% BSA   Recent mild skin flare but now under control   No enthesitis, dactylitis or synovitis  No Axial involvement     NOWAK-28 (CRP): 1.05 (Remission)  CDAI 0  GURJIT 0    Medication monitoring: no signs of toxicity, need to review labwork, get from [facility]  Diarrhea- Arava vs other causes    Immunocompromised:  utd except zoster/shingrix, she is declining the vaccine at this time  TB gold and Hep studies UTD 1/2020    Lung disease/lung nodules: thought due to mtx, pulm following, this is stable    DJD spine, neck back: ok today, with radicular symptoms liya legs/neuropathy, stable, celebrex helps; off gabapentin made her drowsy, takes tumeric daily         Osteopenia reviewd today; normal bmd at spine L1-4, Osteopenia at hip both left femur neck and total femur mean  Lowest T score at left FN -2.3  Mod rsk fx: 11.7% christy, 2.2% hip    Vit D def- level 8: on supplemental vit D3 5k/d now level up to 41       Neuropathy and Chronic foot pain; djd spine- Cervical and Lumbar  Failed gabapentin     Plan:     Flu shot done       Continue humira 40mg every other week  With her diarrhea cut back on arava from 20 mg down to 10 mg and keep dose 3X per week  Topical  kenalog to rash bid    Get to GI due for colonoscopy  Make sure no IBD related to her diarrhea    c/w vit D3 at least 5000 IU per day-- level 41  Ca in diet  Watch Sodium and Potassium intake     Osteopenia with mod risk fx- Repeat dexa in 2 years (2/2021-22); treat if indicated then but for now no need to treat   repeat late next yr     Cont f/u with pulm for ILD    C/w  Cymbalta 30 mg/d- can increase if needed     Return in 4 mos with labwork - reg 4 ext printed     Please call or send portal message with any questions or concerns

## 2020-11-03 LAB — 25(OH)D3+25(OH)D2 SERPL-MCNC: 49 NG/ML (ref 30–96)

## 2020-12-01 ENCOUNTER — TELEPHONE (OUTPATIENT)
Dept: RHEUMATOLOGY | Facility: CLINIC | Age: 65
End: 2020-12-01

## 2020-12-04 ENCOUNTER — DOCUMENTATION ONLY (OUTPATIENT)
Dept: RHEUMATOLOGY | Facility: CLINIC | Age: 65
End: 2020-12-04

## 2020-12-08 ENCOUNTER — DOCUMENTATION ONLY (OUTPATIENT)
Dept: RHEUMATOLOGY | Facility: CLINIC | Age: 65
End: 2020-12-08

## 2021-02-17 ENCOUNTER — TELEPHONE (OUTPATIENT)
Dept: RHEUMATOLOGY | Facility: CLINIC | Age: 66
End: 2021-02-17

## 2021-02-23 ENCOUNTER — TELEPHONE (OUTPATIENT)
Dept: RHEUMATOLOGY | Facility: CLINIC | Age: 66
End: 2021-02-23

## 2021-02-23 DIAGNOSIS — M85.80 OSTEOPENIA WITH HIGH RISK OF FRACTURE: ICD-10-CM

## 2021-02-23 DIAGNOSIS — E55.9 VITAMIN D DEFICIENCY: ICD-10-CM

## 2021-02-26 PROBLEM — E55.9 VITAMIN D DEFICIENCY: Status: ACTIVE | Noted: 2021-02-26

## 2021-02-26 PROBLEM — M85.80 OSTEOPENIA WITH HIGH RISK OF FRACTURE: Status: ACTIVE | Noted: 2021-02-26

## 2021-03-01 ENCOUNTER — PATIENT OUTREACH (OUTPATIENT)
Dept: ADMINISTRATIVE | Facility: OTHER | Age: 66
End: 2021-03-01

## 2021-03-01 DIAGNOSIS — Z12.31 ENCOUNTER FOR SCREENING MAMMOGRAM FOR MALIGNANT NEOPLASM OF BREAST: Primary | ICD-10-CM

## 2021-03-02 ENCOUNTER — OFFICE VISIT (OUTPATIENT)
Dept: RHEUMATOLOGY | Facility: CLINIC | Age: 66
End: 2021-03-02
Payer: MEDICARE

## 2021-03-02 ENCOUNTER — LAB VISIT (OUTPATIENT)
Dept: LAB | Facility: HOSPITAL | Age: 66
End: 2021-03-02
Attending: PHYSICIAN ASSISTANT
Payer: MEDICARE

## 2021-03-02 VITALS
HEART RATE: 67 BPM | HEIGHT: 67 IN | DIASTOLIC BLOOD PRESSURE: 88 MMHG | WEIGHT: 155.63 LBS | SYSTOLIC BLOOD PRESSURE: 143 MMHG | BODY MASS INDEX: 24.43 KG/M2

## 2021-03-02 DIAGNOSIS — M85.80 OSTEOPENIA WITH HIGH RISK OF FRACTURE: ICD-10-CM

## 2021-03-02 DIAGNOSIS — D84.9 IMMUNOCOMPROMISED: ICD-10-CM

## 2021-03-02 DIAGNOSIS — L40.50 PSORIATIC ARTHRITIS: ICD-10-CM

## 2021-03-02 DIAGNOSIS — Z79.899 ENCOUNTER FOR LONG-TERM (CURRENT) USE OF HIGH-RISK MEDICATION: ICD-10-CM

## 2021-03-02 DIAGNOSIS — L40.50 PSORIATIC ARTHRITIS: Primary | ICD-10-CM

## 2021-03-02 DIAGNOSIS — L40.9 PSORIASIS: ICD-10-CM

## 2021-03-02 LAB
ALBUMIN SERPL BCP-MCNC: 4.1 G/DL (ref 3.5–5.2)
ALP SERPL-CCNC: 56 U/L (ref 55–135)
ALT SERPL W/O P-5'-P-CCNC: 30 U/L (ref 10–44)
ANION GAP SERPL CALC-SCNC: 8 MMOL/L (ref 8–16)
AST SERPL-CCNC: 27 U/L (ref 10–40)
BASOPHILS # BLD AUTO: 0.05 K/UL (ref 0–0.2)
BASOPHILS NFR BLD: 1 % (ref 0–1.9)
BILIRUB SERPL-MCNC: 0.6 MG/DL (ref 0.1–1)
BUN SERPL-MCNC: 16 MG/DL (ref 8–23)
CALCIUM SERPL-MCNC: 9.9 MG/DL (ref 8.7–10.5)
CHLORIDE SERPL-SCNC: 101 MMOL/L (ref 95–110)
CO2 SERPL-SCNC: 32 MMOL/L (ref 23–29)
CREAT SERPL-MCNC: 0.8 MG/DL (ref 0.5–1.4)
CRP SERPL-MCNC: 0.2 MG/L (ref 0–8.2)
DIFFERENTIAL METHOD: ABNORMAL
EOSINOPHIL # BLD AUTO: 0.5 K/UL (ref 0–0.5)
EOSINOPHIL NFR BLD: 9 % (ref 0–8)
ERYTHROCYTE [DISTWIDTH] IN BLOOD BY AUTOMATED COUNT: 14 % (ref 11.5–14.5)
ERYTHROCYTE [SEDIMENTATION RATE] IN BLOOD BY WESTERGREN METHOD: 4 MM/HR (ref 0–36)
EST. GFR  (AFRICAN AMERICAN): >60 ML/MIN/1.73 M^2
EST. GFR  (NON AFRICAN AMERICAN): >60 ML/MIN/1.73 M^2
GLUCOSE SERPL-MCNC: 92 MG/DL (ref 70–110)
HCT VFR BLD AUTO: 43.1 % (ref 37–48.5)
HGB BLD-MCNC: 13.9 G/DL (ref 12–16)
IMM GRANULOCYTES # BLD AUTO: 0.01 K/UL (ref 0–0.04)
IMM GRANULOCYTES NFR BLD AUTO: 0.2 % (ref 0–0.5)
LYMPHOCYTES # BLD AUTO: 2.1 K/UL (ref 1–4.8)
LYMPHOCYTES NFR BLD: 39.6 % (ref 18–48)
MCH RBC QN AUTO: 30.7 PG (ref 27–31)
MCHC RBC AUTO-ENTMCNC: 32.3 G/DL (ref 32–36)
MCV RBC AUTO: 95 FL (ref 82–98)
MONOCYTES # BLD AUTO: 0.6 K/UL (ref 0.3–1)
MONOCYTES NFR BLD: 11.7 % (ref 4–15)
NEUTROPHILS # BLD AUTO: 2 K/UL (ref 1.8–7.7)
NEUTROPHILS NFR BLD: 38.5 % (ref 38–73)
NRBC BLD-RTO: 0 /100 WBC
PLATELET # BLD AUTO: 246 K/UL (ref 150–350)
PMV BLD AUTO: 9.8 FL (ref 9.2–12.9)
POTASSIUM SERPL-SCNC: 5 MMOL/L (ref 3.5–5.1)
PROT SERPL-MCNC: 7.9 G/DL (ref 6–8.4)
RBC # BLD AUTO: 4.53 M/UL (ref 4–5.4)
SODIUM SERPL-SCNC: 141 MMOL/L (ref 136–145)
WBC # BLD AUTO: 5.23 K/UL (ref 3.9–12.7)

## 2021-03-02 PROCEDURE — 99213 OFFICE O/P EST LOW 20 MIN: CPT | Mod: PBBFAC | Performed by: PHYSICIAN ASSISTANT

## 2021-03-02 PROCEDURE — 80053 COMPREHEN METABOLIC PANEL: CPT

## 2021-03-02 PROCEDURE — 86140 C-REACTIVE PROTEIN: CPT

## 2021-03-02 PROCEDURE — 99999 PR PBB SHADOW E&M-EST. PATIENT-LVL III: CPT | Mod: PBBFAC,,, | Performed by: PHYSICIAN ASSISTANT

## 2021-03-02 PROCEDURE — 36415 COLL VENOUS BLD VENIPUNCTURE: CPT

## 2021-03-02 PROCEDURE — 99214 OFFICE O/P EST MOD 30 MIN: CPT | Mod: S$PBB,,, | Performed by: PHYSICIAN ASSISTANT

## 2021-03-02 PROCEDURE — 99214 PR OFFICE/OUTPT VISIT, EST, LEVL IV, 30-39 MIN: ICD-10-PCS | Mod: S$PBB,,, | Performed by: PHYSICIAN ASSISTANT

## 2021-03-02 PROCEDURE — 85652 RBC SED RATE AUTOMATED: CPT

## 2021-03-02 PROCEDURE — 99999 PR PBB SHADOW E&M-EST. PATIENT-LVL III: ICD-10-PCS | Mod: PBBFAC,,, | Performed by: PHYSICIAN ASSISTANT

## 2021-03-02 PROCEDURE — 85025 COMPLETE CBC W/AUTO DIFF WBC: CPT

## 2021-03-02 ASSESSMENT — ROUTINE ASSESSMENT OF PATIENT INDEX DATA (RAPID3): MDHAQ FUNCTION SCORE: 0.3

## 2021-03-03 ENCOUNTER — IMMUNIZATION (OUTPATIENT)
Dept: INTERNAL MEDICINE | Facility: CLINIC | Age: 66
End: 2021-03-03
Payer: MEDICARE

## 2021-03-03 DIAGNOSIS — Z23 NEED FOR VACCINATION: Primary | ICD-10-CM

## 2021-03-03 PROCEDURE — 91300 COVID-19, MRNA, LNP-S, PF, 30 MCG/0.3 ML DOSE VACCINE: CPT | Mod: PBBFAC | Performed by: FAMILY MEDICINE

## 2021-03-24 ENCOUNTER — IMMUNIZATION (OUTPATIENT)
Dept: INTERNAL MEDICINE | Facility: CLINIC | Age: 66
End: 2021-03-24
Payer: COMMERCIAL

## 2021-03-24 DIAGNOSIS — Z23 NEED FOR VACCINATION: Primary | ICD-10-CM

## 2021-03-24 PROCEDURE — 91300 COVID-19, MRNA, LNP-S, PF, 30 MCG/0.3 ML DOSE VACCINE: ICD-10-PCS | Mod: ,,, | Performed by: FAMILY MEDICINE

## 2021-03-24 PROCEDURE — 0002A COVID-19, MRNA, LNP-S, PF, 30 MCG/0.3 ML DOSE VACCINE: CPT | Mod: CV19,,, | Performed by: FAMILY MEDICINE

## 2021-03-24 PROCEDURE — 0002A COVID-19, MRNA, LNP-S, PF, 30 MCG/0.3 ML DOSE VACCINE: ICD-10-PCS | Mod: CV19,,, | Performed by: FAMILY MEDICINE

## 2021-03-24 PROCEDURE — 91300 COVID-19, MRNA, LNP-S, PF, 30 MCG/0.3 ML DOSE VACCINE: CPT | Mod: ,,, | Performed by: FAMILY MEDICINE

## 2021-04-19 ENCOUNTER — LAB VISIT (OUTPATIENT)
Dept: LAB | Facility: HOSPITAL | Age: 66
End: 2021-04-19
Attending: FAMILY MEDICINE
Payer: MEDICARE

## 2021-04-19 ENCOUNTER — PATIENT MESSAGE (OUTPATIENT)
Dept: ENDOSCOPY | Facility: HOSPITAL | Age: 66
End: 2021-04-19

## 2021-04-19 ENCOUNTER — OFFICE VISIT (OUTPATIENT)
Dept: INTERNAL MEDICINE | Facility: CLINIC | Age: 66
End: 2021-04-19
Payer: MEDICARE

## 2021-04-19 VITALS
DIASTOLIC BLOOD PRESSURE: 74 MMHG | WEIGHT: 155.63 LBS | HEART RATE: 68 BPM | SYSTOLIC BLOOD PRESSURE: 110 MMHG | TEMPERATURE: 97 F | HEIGHT: 67 IN | BODY MASS INDEX: 24.43 KG/M2 | OXYGEN SATURATION: 97 %

## 2021-04-19 DIAGNOSIS — Z00.00 ROUTINE HEALTH MAINTENANCE: ICD-10-CM

## 2021-04-19 DIAGNOSIS — Z11.4 ENCOUNTER FOR SCREENING FOR HUMAN IMMUNODEFICIENCY VIRUS (HIV): ICD-10-CM

## 2021-04-19 DIAGNOSIS — E03.9 HYPOTHYROIDISM, UNSPECIFIED TYPE: ICD-10-CM

## 2021-04-19 DIAGNOSIS — Z00.00 ROUTINE HEALTH MAINTENANCE: Primary | ICD-10-CM

## 2021-04-19 DIAGNOSIS — Z12.11 SCREEN FOR COLON CANCER: ICD-10-CM

## 2021-04-19 LAB
ALBUMIN SERPL BCP-MCNC: 3.8 G/DL (ref 3.5–5.2)
ALP SERPL-CCNC: 70 U/L (ref 55–135)
ALT SERPL W/O P-5'-P-CCNC: 39 U/L (ref 10–44)
ANION GAP SERPL CALC-SCNC: 8 MMOL/L (ref 8–16)
AST SERPL-CCNC: 32 U/L (ref 10–40)
BILIRUB SERPL-MCNC: 0.7 MG/DL (ref 0.1–1)
BUN SERPL-MCNC: 22 MG/DL (ref 8–23)
CALCIUM SERPL-MCNC: 9.9 MG/DL (ref 8.7–10.5)
CHLORIDE SERPL-SCNC: 100 MMOL/L (ref 95–110)
CHOLEST SERPL-MCNC: 281 MG/DL (ref 120–199)
CHOLEST/HDLC SERPL: 3.4 {RATIO} (ref 2–5)
CO2 SERPL-SCNC: 30 MMOL/L (ref 23–29)
CREAT SERPL-MCNC: 0.8 MG/DL (ref 0.5–1.4)
EST. GFR  (AFRICAN AMERICAN): >60 ML/MIN/1.73 M^2
EST. GFR  (NON AFRICAN AMERICAN): >60 ML/MIN/1.73 M^2
GLUCOSE SERPL-MCNC: 72 MG/DL (ref 70–110)
HDLC SERPL-MCNC: 82 MG/DL (ref 40–75)
HDLC SERPL: 29.2 % (ref 20–50)
LDLC SERPL CALC-MCNC: 182.2 MG/DL (ref 63–159)
NONHDLC SERPL-MCNC: 199 MG/DL
POTASSIUM SERPL-SCNC: 4.3 MMOL/L (ref 3.5–5.1)
PROT SERPL-MCNC: 8 G/DL (ref 6–8.4)
SODIUM SERPL-SCNC: 138 MMOL/L (ref 136–145)
T4 FREE SERPL-MCNC: 0.88 NG/DL (ref 0.71–1.51)
TRIGL SERPL-MCNC: 84 MG/DL (ref 30–150)
TSH SERPL DL<=0.005 MIU/L-ACNC: 4.87 UIU/ML (ref 0.4–4)

## 2021-04-19 PROCEDURE — 84439 ASSAY OF FREE THYROXINE: CPT | Performed by: FAMILY MEDICINE

## 2021-04-19 PROCEDURE — G0009 ADMIN PNEUMOCOCCAL VACCINE: HCPCS | Mod: PBBFAC

## 2021-04-19 PROCEDURE — 80061 LIPID PANEL: CPT | Performed by: FAMILY MEDICINE

## 2021-04-19 PROCEDURE — 99397 PER PM REEVAL EST PAT 65+ YR: CPT | Mod: S$PBB,,, | Performed by: FAMILY MEDICINE

## 2021-04-19 PROCEDURE — 86703 HIV-1/HIV-2 1 RESULT ANTBDY: CPT | Performed by: FAMILY MEDICINE

## 2021-04-19 PROCEDURE — 80053 COMPREHEN METABOLIC PANEL: CPT | Performed by: FAMILY MEDICINE

## 2021-04-19 PROCEDURE — 36415 COLL VENOUS BLD VENIPUNCTURE: CPT | Performed by: FAMILY MEDICINE

## 2021-04-19 PROCEDURE — 99397 PR PREVENTIVE VISIT,EST,65 & OVER: ICD-10-PCS | Mod: S$PBB,,, | Performed by: FAMILY MEDICINE

## 2021-04-19 PROCEDURE — 99999 PR PBB SHADOW E&M-EST. PATIENT-LVL IV: ICD-10-PCS | Mod: PBBFAC,,, | Performed by: FAMILY MEDICINE

## 2021-04-19 PROCEDURE — 99214 OFFICE O/P EST MOD 30 MIN: CPT | Mod: PBBFAC | Performed by: FAMILY MEDICINE

## 2021-04-19 PROCEDURE — 99999 PR PBB SHADOW E&M-EST. PATIENT-LVL IV: CPT | Mod: PBBFAC,,, | Performed by: FAMILY MEDICINE

## 2021-04-19 PROCEDURE — 84443 ASSAY THYROID STIM HORMONE: CPT | Performed by: FAMILY MEDICINE

## 2021-04-19 PROCEDURE — 90732 PPSV23 VACC 2 YRS+ SUBQ/IM: CPT | Mod: PBBFAC

## 2021-04-19 RX ORDER — PYRIDOXINE HCL (VITAMIN B6) 100 MG
TABLET ORAL
COMMUNITY
End: 2022-08-17

## 2021-04-20 ENCOUNTER — TELEPHONE (OUTPATIENT)
Dept: ENDOSCOPY | Facility: HOSPITAL | Age: 66
End: 2021-04-20

## 2021-04-20 DIAGNOSIS — Z01.818 PRE-OP TESTING: ICD-10-CM

## 2021-04-20 LAB — HIV 1+2 AB+HIV1 P24 AG SERPL QL IA: NEGATIVE

## 2021-04-20 RX ORDER — SODIUM, POTASSIUM,MAG SULFATES 17.5-3.13G
1 SOLUTION, RECONSTITUTED, ORAL ORAL DAILY
Qty: 1 KIT | Refills: 0 | Status: SHIPPED | OUTPATIENT
Start: 2021-04-20 | End: 2021-04-22

## 2021-05-02 ENCOUNTER — TELEPHONE (OUTPATIENT)
Dept: INTERNAL MEDICINE | Facility: CLINIC | Age: 66
End: 2021-05-02

## 2021-05-02 DIAGNOSIS — E03.9 HYPOTHYROIDISM, UNSPECIFIED TYPE: Primary | ICD-10-CM

## 2021-05-20 ENCOUNTER — PATIENT OUTREACH (OUTPATIENT)
Dept: ADMINISTRATIVE | Facility: HOSPITAL | Age: 66
End: 2021-05-20

## 2021-05-24 ENCOUNTER — TELEPHONE (OUTPATIENT)
Dept: ADMINISTRATIVE | Facility: HOSPITAL | Age: 66
End: 2021-05-24

## 2021-05-25 ENCOUNTER — ANESTHESIA EVENT (OUTPATIENT)
Dept: ENDOSCOPY | Facility: HOSPITAL | Age: 66
End: 2021-05-25
Payer: MEDICARE

## 2021-05-27 ENCOUNTER — HOSPITAL ENCOUNTER (OUTPATIENT)
Facility: HOSPITAL | Age: 66
Discharge: HOME | End: 2021-05-27
Attending: SURGERY | Admitting: SURGERY
Payer: MEDICARE

## 2021-05-27 ENCOUNTER — ANESTHESIA (OUTPATIENT)
Dept: ENDOSCOPY | Facility: HOSPITAL | Age: 66
End: 2021-05-27
Payer: MEDICARE

## 2021-05-27 VITALS
HEIGHT: 67 IN | OXYGEN SATURATION: 100 % | RESPIRATION RATE: 18 BRPM | DIASTOLIC BLOOD PRESSURE: 84 MMHG | WEIGHT: 151 LBS | TEMPERATURE: 98 F | HEART RATE: 100 BPM | BODY MASS INDEX: 23.7 KG/M2 | SYSTOLIC BLOOD PRESSURE: 133 MMHG

## 2021-05-27 DIAGNOSIS — Z12.11 COLON CANCER SCREENING: ICD-10-CM

## 2021-05-27 PROCEDURE — G0121 COLON CA SCRN NOT HI RSK IND: ICD-10-PCS | Mod: ,,, | Performed by: SURGERY

## 2021-05-27 PROCEDURE — 63600175 PHARM REV CODE 636 W HCPCS: Performed by: SURGERY

## 2021-05-27 PROCEDURE — 63600175 PHARM REV CODE 636 W HCPCS: Performed by: NURSE ANESTHETIST, CERTIFIED REGISTERED

## 2021-05-27 PROCEDURE — D9220A PRA ANESTHESIA: ICD-10-PCS | Mod: CRNA,,, | Performed by: NURSE ANESTHETIST, CERTIFIED REGISTERED

## 2021-05-27 PROCEDURE — G0121 COLON CA SCRN NOT HI RSK IND: HCPCS | Performed by: SURGERY

## 2021-05-27 PROCEDURE — 25000003 PHARM REV CODE 250: Performed by: NURSE ANESTHETIST, CERTIFIED REGISTERED

## 2021-05-27 PROCEDURE — 37000008 HC ANESTHESIA 1ST 15 MINUTES: Performed by: SURGERY

## 2021-05-27 PROCEDURE — D9220A PRA ANESTHESIA: Mod: ANES,,, | Performed by: ANESTHESIOLOGY

## 2021-05-27 PROCEDURE — 37000009 HC ANESTHESIA EA ADD 15 MINS: Performed by: SURGERY

## 2021-05-27 PROCEDURE — D9220A PRA ANESTHESIA: ICD-10-PCS | Mod: ANES,,, | Performed by: ANESTHESIOLOGY

## 2021-05-27 PROCEDURE — G0121 COLON CA SCRN NOT HI RSK IND: HCPCS | Mod: ,,, | Performed by: SURGERY

## 2021-05-27 PROCEDURE — D9220A PRA ANESTHESIA: Mod: CRNA,,, | Performed by: NURSE ANESTHETIST, CERTIFIED REGISTERED

## 2021-05-27 RX ORDER — PROPOFOL 10 MG/ML
VIAL (ML) INTRAVENOUS
Status: DISCONTINUED | OUTPATIENT
Start: 2021-05-27 | End: 2021-05-27

## 2021-05-27 RX ORDER — SODIUM CHLORIDE, SODIUM LACTATE, POTASSIUM CHLORIDE, CALCIUM CHLORIDE 600; 310; 30; 20 MG/100ML; MG/100ML; MG/100ML; MG/100ML
INJECTION, SOLUTION INTRAVENOUS CONTINUOUS
Status: DISCONTINUED | OUTPATIENT
Start: 2021-05-27 | End: 2021-05-27 | Stop reason: HOSPADM

## 2021-05-27 RX ORDER — LIDOCAINE HYDROCHLORIDE 20 MG/ML
INJECTION, SOLUTION EPIDURAL; INFILTRATION; INTRACAUDAL; PERINEURAL
Status: DISCONTINUED | OUTPATIENT
Start: 2021-05-27 | End: 2021-05-27

## 2021-05-27 RX ADMIN — PROPOFOL 20 MG: 10 INJECTION, EMULSION INTRAVENOUS at 10:05

## 2021-05-27 RX ADMIN — LIDOCAINE HYDROCHLORIDE 50 MG: 20 INJECTION, SOLUTION EPIDURAL; INFILTRATION; INTRACAUDAL; PERINEURAL at 10:05

## 2021-05-27 RX ADMIN — PROPOFOL 30 MG: 10 INJECTION, EMULSION INTRAVENOUS at 10:05

## 2021-05-27 RX ADMIN — PROPOFOL 100 MG: 10 INJECTION, EMULSION INTRAVENOUS at 10:05

## 2021-05-27 RX ADMIN — SODIUM CHLORIDE, SODIUM LACTATE, POTASSIUM CHLORIDE, AND CALCIUM CHLORIDE: 600; 310; 30; 20 INJECTION, SOLUTION INTRAVENOUS at 10:05

## 2021-06-09 DIAGNOSIS — L40.9 PSORIASIS: ICD-10-CM

## 2021-06-09 DIAGNOSIS — L40.50 PSORIATIC ARTHRITIS: ICD-10-CM

## 2021-06-09 RX ORDER — ADALIMUMAB 40MG/0.4ML
KIT SUBCUTANEOUS
Qty: 2 PEN | Refills: 5 | Status: SHIPPED | OUTPATIENT
Start: 2021-06-09 | End: 2021-11-24 | Stop reason: SDUPTHER

## 2021-07-06 ENCOUNTER — PATIENT OUTREACH (OUTPATIENT)
Dept: ADMINISTRATIVE | Facility: OTHER | Age: 66
End: 2021-07-06

## 2021-07-06 ENCOUNTER — TELEPHONE (OUTPATIENT)
Dept: ADMINISTRATIVE | Facility: HOSPITAL | Age: 66
End: 2021-07-06

## 2021-07-07 ENCOUNTER — OFFICE VISIT (OUTPATIENT)
Dept: RHEUMATOLOGY | Facility: CLINIC | Age: 66
End: 2021-07-07
Payer: MEDICARE

## 2021-07-07 ENCOUNTER — LAB VISIT (OUTPATIENT)
Dept: LAB | Facility: HOSPITAL | Age: 66
End: 2021-07-07
Attending: PHYSICIAN ASSISTANT
Payer: COMMERCIAL

## 2021-07-07 VITALS
BODY MASS INDEX: 24.71 KG/M2 | HEART RATE: 57 BPM | HEIGHT: 67 IN | DIASTOLIC BLOOD PRESSURE: 89 MMHG | WEIGHT: 157.44 LBS | SYSTOLIC BLOOD PRESSURE: 153 MMHG

## 2021-07-07 DIAGNOSIS — Z79.899 ENCOUNTER FOR LONG-TERM (CURRENT) USE OF HIGH-RISK MEDICATION: ICD-10-CM

## 2021-07-07 DIAGNOSIS — M47.817 LUMBAR AND SACRAL OSTEOARTHRITIS: ICD-10-CM

## 2021-07-07 DIAGNOSIS — E55.9 VITAMIN D DEFICIENCY: ICD-10-CM

## 2021-07-07 DIAGNOSIS — Z51.81 MEDICATION MONITORING ENCOUNTER: ICD-10-CM

## 2021-07-07 DIAGNOSIS — D84.9 IMMUNOCOMPROMISED: ICD-10-CM

## 2021-07-07 DIAGNOSIS — M85.80 OSTEOPENIA WITH HIGH RISK OF FRACTURE: ICD-10-CM

## 2021-07-07 DIAGNOSIS — G62.9 NEUROPATHY: ICD-10-CM

## 2021-07-07 DIAGNOSIS — L40.50 PSORIATIC ARTHRITIS: ICD-10-CM

## 2021-07-07 DIAGNOSIS — L40.9 PSORIASIS: ICD-10-CM

## 2021-07-07 DIAGNOSIS — E03.9 HYPOTHYROIDISM, UNSPECIFIED TYPE: ICD-10-CM

## 2021-07-07 DIAGNOSIS — L40.50 PSORIATIC ARTHRITIS: Primary | ICD-10-CM

## 2021-07-07 LAB
ALBUMIN SERPL BCP-MCNC: 3.8 G/DL (ref 3.5–5.2)
ALP SERPL-CCNC: 60 U/L (ref 55–135)
ALT SERPL W/O P-5'-P-CCNC: 32 U/L (ref 10–44)
ANION GAP SERPL CALC-SCNC: 8 MMOL/L (ref 8–16)
AST SERPL-CCNC: 27 U/L (ref 10–40)
BASOPHILS # BLD AUTO: 0.07 K/UL (ref 0–0.2)
BASOPHILS NFR BLD: 1.6 % (ref 0–1.9)
BILIRUB SERPL-MCNC: 0.7 MG/DL (ref 0.1–1)
BUN SERPL-MCNC: 13 MG/DL (ref 8–23)
CALCIUM SERPL-MCNC: 10 MG/DL (ref 8.7–10.5)
CHLORIDE SERPL-SCNC: 103 MMOL/L (ref 95–110)
CO2 SERPL-SCNC: 28 MMOL/L (ref 23–29)
CREAT SERPL-MCNC: 0.8 MG/DL (ref 0.5–1.4)
CRP SERPL-MCNC: 0.3 MG/L (ref 0–8.2)
DIFFERENTIAL METHOD: ABNORMAL
EOSINOPHIL # BLD AUTO: 0.5 K/UL (ref 0–0.5)
EOSINOPHIL NFR BLD: 10.9 % (ref 0–8)
ERYTHROCYTE [DISTWIDTH] IN BLOOD BY AUTOMATED COUNT: 13.7 % (ref 11.5–14.5)
ERYTHROCYTE [SEDIMENTATION RATE] IN BLOOD BY WESTERGREN METHOD: 9 MM/HR (ref 0–36)
EST. GFR  (AFRICAN AMERICAN): >60 ML/MIN/1.73 M^2
EST. GFR  (NON AFRICAN AMERICAN): >60 ML/MIN/1.73 M^2
GLUCOSE SERPL-MCNC: 78 MG/DL (ref 70–110)
HCT VFR BLD AUTO: 41.6 % (ref 37–48.5)
HGB BLD-MCNC: 13.2 G/DL (ref 12–16)
IMM GRANULOCYTES # BLD AUTO: 0.01 K/UL (ref 0–0.04)
IMM GRANULOCYTES NFR BLD AUTO: 0.2 % (ref 0–0.5)
LYMPHOCYTES # BLD AUTO: 1.9 K/UL (ref 1–4.8)
LYMPHOCYTES NFR BLD: 42.9 % (ref 18–48)
MCH RBC QN AUTO: 30.1 PG (ref 27–31)
MCHC RBC AUTO-ENTMCNC: 31.7 G/DL (ref 32–36)
MCV RBC AUTO: 95 FL (ref 82–98)
MONOCYTES # BLD AUTO: 0.6 K/UL (ref 0.3–1)
MONOCYTES NFR BLD: 12.9 % (ref 4–15)
NEUTROPHILS # BLD AUTO: 1.4 K/UL (ref 1.8–7.7)
NEUTROPHILS NFR BLD: 31.5 % (ref 38–73)
NRBC BLD-RTO: 0 /100 WBC
PLATELET # BLD AUTO: 255 K/UL (ref 150–450)
PMV BLD AUTO: 10.2 FL (ref 9.2–12.9)
POTASSIUM SERPL-SCNC: 5.1 MMOL/L (ref 3.5–5.1)
PROT SERPL-MCNC: 7.8 G/DL (ref 6–8.4)
RBC # BLD AUTO: 4.39 M/UL (ref 4–5.4)
SODIUM SERPL-SCNC: 139 MMOL/L (ref 136–145)
T3 SERPL-MCNC: 71 NG/DL (ref 60–180)
T4 FREE SERPL-MCNC: 0.82 NG/DL (ref 0.71–1.51)
TSH SERPL DL<=0.005 MIU/L-ACNC: 6.33 UIU/ML (ref 0.4–4)
WBC # BLD AUTO: 4.5 K/UL (ref 3.9–12.7)

## 2021-07-07 PROCEDURE — 99999 PR PBB SHADOW E&M-EST. PATIENT-LVL III: CPT | Mod: PBBFAC,,, | Performed by: PHYSICIAN ASSISTANT

## 2021-07-07 PROCEDURE — 80053 COMPREHEN METABOLIC PANEL: CPT | Performed by: PHYSICIAN ASSISTANT

## 2021-07-07 PROCEDURE — 99213 OFFICE O/P EST LOW 20 MIN: CPT | Mod: PBBFAC | Performed by: PHYSICIAN ASSISTANT

## 2021-07-07 PROCEDURE — 84480 ASSAY TRIIODOTHYRONINE (T3): CPT | Performed by: PHYSICIAN ASSISTANT

## 2021-07-07 PROCEDURE — 84443 ASSAY THYROID STIM HORMONE: CPT | Performed by: PHYSICIAN ASSISTANT

## 2021-07-07 PROCEDURE — 99214 PR OFFICE/OUTPT VISIT, EST, LEVL IV, 30-39 MIN: ICD-10-PCS | Mod: S$PBB,,, | Performed by: PHYSICIAN ASSISTANT

## 2021-07-07 PROCEDURE — 36415 COLL VENOUS BLD VENIPUNCTURE: CPT | Performed by: PHYSICIAN ASSISTANT

## 2021-07-07 PROCEDURE — 85025 COMPLETE CBC W/AUTO DIFF WBC: CPT | Performed by: PHYSICIAN ASSISTANT

## 2021-07-07 PROCEDURE — 84439 ASSAY OF FREE THYROXINE: CPT | Performed by: PHYSICIAN ASSISTANT

## 2021-07-07 PROCEDURE — 99214 OFFICE O/P EST MOD 30 MIN: CPT | Mod: S$PBB,,, | Performed by: PHYSICIAN ASSISTANT

## 2021-07-07 PROCEDURE — 86140 C-REACTIVE PROTEIN: CPT | Performed by: PHYSICIAN ASSISTANT

## 2021-07-07 PROCEDURE — 99999 PR PBB SHADOW E&M-EST. PATIENT-LVL III: ICD-10-PCS | Mod: PBBFAC,,, | Performed by: PHYSICIAN ASSISTANT

## 2021-07-07 PROCEDURE — 85652 RBC SED RATE AUTOMATED: CPT | Performed by: PHYSICIAN ASSISTANT

## 2021-07-07 RX ORDER — DULOXETIN HYDROCHLORIDE 60 MG/1
60 CAPSULE, DELAYED RELEASE ORAL DAILY
Qty: 30 CAPSULE | Refills: 6 | Status: SHIPPED | OUTPATIENT
Start: 2021-07-07 | End: 2021-08-06

## 2021-07-07 RX ORDER — MUPIROCIN 20 MG/G
OINTMENT TOPICAL 3 TIMES DAILY
Qty: 30 G | Refills: 2 | Status: SHIPPED | OUTPATIENT
Start: 2021-07-07 | End: 2023-02-22 | Stop reason: ALTCHOICE

## 2021-07-07 RX ORDER — CELECOXIB 200 MG/1
200 CAPSULE ORAL DAILY
Qty: 30 CAPSULE | Refills: 6 | Status: SHIPPED | OUTPATIENT
Start: 2021-07-07 | End: 2022-08-17

## 2021-07-07 ASSESSMENT — ROUTINE ASSESSMENT OF PATIENT INDEX DATA (RAPID3): MDHAQ FUNCTION SCORE: 0.1

## 2021-07-12 ENCOUNTER — PATIENT MESSAGE (OUTPATIENT)
Dept: ADMINISTRATIVE | Facility: HOSPITAL | Age: 66
End: 2021-07-12

## 2021-07-13 DIAGNOSIS — E03.9 ACQUIRED HYPOTHYROIDISM: Primary | ICD-10-CM

## 2021-07-13 RX ORDER — LEVOTHYROXINE SODIUM 88 UG/1
88 TABLET ORAL
Qty: 90 TABLET | Refills: 3 | Status: SHIPPED | OUTPATIENT
Start: 2021-07-13 | End: 2022-03-11

## 2021-09-13 ENCOUNTER — PATIENT MESSAGE (OUTPATIENT)
Dept: RHEUMATOLOGY | Facility: CLINIC | Age: 66
End: 2021-09-13

## 2021-09-14 ENCOUNTER — LAB VISIT (OUTPATIENT)
Dept: LAB | Facility: HOSPITAL | Age: 66
End: 2021-09-14
Attending: PHYSICIAN ASSISTANT
Payer: MEDICARE

## 2021-09-14 DIAGNOSIS — Z51.81 MEDICATION MONITORING ENCOUNTER: ICD-10-CM

## 2021-09-14 DIAGNOSIS — D84.9 IMMUNOCOMPROMISED: ICD-10-CM

## 2021-09-14 DIAGNOSIS — L40.50 PSORIATIC ARTHRITIS: ICD-10-CM

## 2021-09-14 DIAGNOSIS — Z79.899 ENCOUNTER FOR LONG-TERM (CURRENT) USE OF HIGH-RISK MEDICATION: ICD-10-CM

## 2021-09-14 LAB
ALBUMIN SERPL BCP-MCNC: 3.9 G/DL (ref 3.5–5.2)
ALP SERPL-CCNC: 61 U/L (ref 55–135)
ALT SERPL W/O P-5'-P-CCNC: 34 U/L (ref 10–44)
ANION GAP SERPL CALC-SCNC: 10 MMOL/L (ref 8–16)
AST SERPL-CCNC: 30 U/L (ref 10–40)
BASOPHILS # BLD AUTO: 0.06 K/UL (ref 0–0.2)
BASOPHILS NFR BLD: 1.2 % (ref 0–1.9)
BILIRUB SERPL-MCNC: 0.6 MG/DL (ref 0.1–1)
BUN SERPL-MCNC: 17 MG/DL (ref 8–23)
CALCIUM SERPL-MCNC: 10.1 MG/DL (ref 8.7–10.5)
CHLORIDE SERPL-SCNC: 106 MMOL/L (ref 95–110)
CO2 SERPL-SCNC: 26 MMOL/L (ref 23–29)
CREAT SERPL-MCNC: 0.7 MG/DL (ref 0.5–1.4)
CRP SERPL-MCNC: 0.2 MG/L (ref 0–8.2)
DIFFERENTIAL METHOD: ABNORMAL
EOSINOPHIL # BLD AUTO: 0.4 K/UL (ref 0–0.5)
EOSINOPHIL NFR BLD: 8.1 % (ref 0–8)
ERYTHROCYTE [DISTWIDTH] IN BLOOD BY AUTOMATED COUNT: 14 % (ref 11.5–14.5)
ERYTHROCYTE [SEDIMENTATION RATE] IN BLOOD BY WESTERGREN METHOD: 6 MM/HR (ref 0–36)
EST. GFR  (AFRICAN AMERICAN): >60 ML/MIN/1.73 M^2
EST. GFR  (NON AFRICAN AMERICAN): >60 ML/MIN/1.73 M^2
GLUCOSE SERPL-MCNC: 82 MG/DL (ref 70–110)
HCT VFR BLD AUTO: 42.5 % (ref 37–48.5)
HGB BLD-MCNC: 13.7 G/DL (ref 12–16)
IMM GRANULOCYTES # BLD AUTO: 0.01 K/UL (ref 0–0.04)
IMM GRANULOCYTES NFR BLD AUTO: 0.2 % (ref 0–0.5)
LYMPHOCYTES # BLD AUTO: 1.9 K/UL (ref 1–4.8)
LYMPHOCYTES NFR BLD: 37.9 % (ref 18–48)
MCH RBC QN AUTO: 31 PG (ref 27–31)
MCHC RBC AUTO-ENTMCNC: 32.2 G/DL (ref 32–36)
MCV RBC AUTO: 96 FL (ref 82–98)
MONOCYTES # BLD AUTO: 0.7 K/UL (ref 0.3–1)
MONOCYTES NFR BLD: 13.5 % (ref 4–15)
NEUTROPHILS # BLD AUTO: 1.9 K/UL (ref 1.8–7.7)
NEUTROPHILS NFR BLD: 39.1 % (ref 38–73)
NRBC BLD-RTO: 0 /100 WBC
PLATELET # BLD AUTO: 247 K/UL (ref 150–450)
PMV BLD AUTO: 10.1 FL (ref 9.2–12.9)
POTASSIUM SERPL-SCNC: 5.6 MMOL/L (ref 3.5–5.1)
PROT SERPL-MCNC: 7.8 G/DL (ref 6–8.4)
RBC # BLD AUTO: 4.42 M/UL (ref 4–5.4)
SODIUM SERPL-SCNC: 142 MMOL/L (ref 136–145)
WBC # BLD AUTO: 4.96 K/UL (ref 3.9–12.7)

## 2021-09-14 PROCEDURE — 85652 RBC SED RATE AUTOMATED: CPT | Performed by: PHYSICIAN ASSISTANT

## 2021-09-14 PROCEDURE — 80053 COMPREHEN METABOLIC PANEL: CPT | Performed by: PHYSICIAN ASSISTANT

## 2021-09-14 PROCEDURE — 36415 COLL VENOUS BLD VENIPUNCTURE: CPT | Performed by: PHYSICIAN ASSISTANT

## 2021-09-14 PROCEDURE — 85025 COMPLETE CBC W/AUTO DIFF WBC: CPT | Performed by: PHYSICIAN ASSISTANT

## 2021-09-14 PROCEDURE — 86140 C-REACTIVE PROTEIN: CPT | Performed by: PHYSICIAN ASSISTANT

## 2021-09-20 ENCOUNTER — PATIENT MESSAGE (OUTPATIENT)
Dept: RHEUMATOLOGY | Facility: CLINIC | Age: 66
End: 2021-09-20

## 2021-09-21 ENCOUNTER — TELEPHONE (OUTPATIENT)
Dept: INTERNAL MEDICINE | Facility: CLINIC | Age: 66
End: 2021-09-21

## 2021-09-21 ENCOUNTER — PATIENT MESSAGE (OUTPATIENT)
Dept: INTERNAL MEDICINE | Facility: CLINIC | Age: 66
End: 2021-09-21

## 2021-09-21 DIAGNOSIS — E87.5 HYPERKALEMIA: Primary | ICD-10-CM

## 2021-09-22 ENCOUNTER — TELEPHONE (OUTPATIENT)
Dept: INTERNAL MEDICINE | Facility: CLINIC | Age: 66
End: 2021-09-22

## 2021-09-22 ENCOUNTER — LAB VISIT (OUTPATIENT)
Dept: LAB | Facility: HOSPITAL | Age: 66
End: 2021-09-22
Attending: FAMILY MEDICINE
Payer: MEDICARE

## 2021-09-22 DIAGNOSIS — E87.5 HYPERKALEMIA: ICD-10-CM

## 2021-09-22 LAB
ANION GAP SERPL CALC-SCNC: 11 MMOL/L (ref 8–16)
BUN SERPL-MCNC: 15 MG/DL (ref 8–23)
CALCIUM SERPL-MCNC: 10 MG/DL (ref 8.7–10.5)
CHLORIDE SERPL-SCNC: 102 MMOL/L (ref 95–110)
CO2 SERPL-SCNC: 28 MMOL/L (ref 23–29)
CREAT SERPL-MCNC: 0.7 MG/DL (ref 0.5–1.4)
EST. GFR  (AFRICAN AMERICAN): >60 ML/MIN/1.73 M^2
EST. GFR  (NON AFRICAN AMERICAN): >60 ML/MIN/1.73 M^2
GLUCOSE SERPL-MCNC: 61 MG/DL (ref 70–110)
POTASSIUM SERPL-SCNC: 3.8 MMOL/L (ref 3.5–5.1)
SODIUM SERPL-SCNC: 141 MMOL/L (ref 136–145)

## 2021-09-22 PROCEDURE — 80048 BASIC METABOLIC PNL TOTAL CA: CPT | Performed by: FAMILY MEDICINE

## 2021-09-22 PROCEDURE — 36415 COLL VENOUS BLD VENIPUNCTURE: CPT | Performed by: FAMILY MEDICINE

## 2021-10-22 DIAGNOSIS — L40.50 PSORIATIC ARTHRITIS: Primary | ICD-10-CM

## 2021-10-22 DIAGNOSIS — D84.9 IMMUNOCOMPROMISED: ICD-10-CM

## 2021-11-18 ENCOUNTER — DOCUMENTATION ONLY (OUTPATIENT)
Dept: RHEUMATOLOGY | Facility: CLINIC | Age: 66
End: 2021-11-18
Payer: MEDICARE

## 2021-11-24 DIAGNOSIS — L40.50 PSORIATIC ARTHRITIS: ICD-10-CM

## 2021-11-24 DIAGNOSIS — L40.9 PSORIASIS: ICD-10-CM

## 2021-11-29 RX ORDER — ADALIMUMAB 40MG/0.4ML
KIT SUBCUTANEOUS
Qty: 2 PEN | Refills: 5 | Status: SHIPPED | OUTPATIENT
Start: 2021-11-29 | End: 2022-01-07 | Stop reason: SDUPTHER

## 2022-01-07 DIAGNOSIS — L40.50 PSORIATIC ARTHRITIS: ICD-10-CM

## 2022-01-07 DIAGNOSIS — L40.9 PSORIASIS: ICD-10-CM

## 2022-01-07 RX ORDER — ADALIMUMAB 40MG/0.4ML
KIT SUBCUTANEOUS
Qty: 2 PEN | Refills: 5 | Status: SHIPPED | OUTPATIENT
Start: 2022-01-07 | End: 2022-01-10 | Stop reason: SDUPTHER

## 2022-01-07 NOTE — TELEPHONE ENCOUNTER
----- Message from Angelita Cruzito sent at 1/7/2022  2:45 PM CST -----  Contact: [phone]  Patient is calling in requesting a call back about her meds. Please call her back at [phone]  Thanks.[email or web address]

## 2022-01-07 NOTE — TELEPHONE ENCOUNTER
Spoke to patient and she update her new pharmacy information.  As of January 1st she will be using [facility] for her Humira prescription.    Will send request to Dr. MACK for review

## 2022-01-10 DIAGNOSIS — L40.9 PSORIASIS: ICD-10-CM

## 2022-01-10 DIAGNOSIS — L40.50 PSORIATIC ARTHRITIS: ICD-10-CM

## 2022-01-10 RX ORDER — ADALIMUMAB 40MG/0.4ML
KIT SUBCUTANEOUS
Qty: 2 PEN | Refills: 5 | Status: SHIPPED | OUTPATIENT
Start: 2022-01-10 | End: 2022-08-04

## 2022-01-27 ENCOUNTER — OFFICE VISIT (OUTPATIENT)
Dept: RHEUMATOLOGY | Facility: CLINIC | Age: 67
End: 2022-01-27
Payer: MEDICARE

## 2022-01-27 ENCOUNTER — LAB VISIT (OUTPATIENT)
Dept: LAB | Facility: HOSPITAL | Age: 67
End: 2022-01-27
Attending: INTERNAL MEDICINE
Payer: MEDICARE

## 2022-01-27 VITALS
HEIGHT: 67 IN | BODY MASS INDEX: 24.43 KG/M2 | HEART RATE: 82 BPM | WEIGHT: 155.63 LBS | DIASTOLIC BLOOD PRESSURE: 94 MMHG | SYSTOLIC BLOOD PRESSURE: 148 MMHG

## 2022-01-27 DIAGNOSIS — G62.9 NEUROPATHY: ICD-10-CM

## 2022-01-27 DIAGNOSIS — D84.9 IMMUNOCOMPROMISED: ICD-10-CM

## 2022-01-27 DIAGNOSIS — M54.32 SCIATICA OF LEFT SIDE: ICD-10-CM

## 2022-01-27 DIAGNOSIS — M47.817 LUMBAR AND SACRAL OSTEOARTHRITIS: ICD-10-CM

## 2022-01-27 DIAGNOSIS — L40.9 PSORIASIS: ICD-10-CM

## 2022-01-27 DIAGNOSIS — L40.50 PSORIATIC ARTHRITIS: Primary | ICD-10-CM

## 2022-01-27 DIAGNOSIS — L40.50 PSORIATIC ARTHRITIS: ICD-10-CM

## 2022-01-27 DIAGNOSIS — Z79.899 ENCOUNTER FOR LONG-TERM (CURRENT) USE OF HIGH-RISK MEDICATION: ICD-10-CM

## 2022-01-27 LAB
ALBUMIN SERPL BCP-MCNC: 3.9 G/DL (ref 3.5–5.2)
ALP SERPL-CCNC: 53 U/L (ref 55–135)
ALT SERPL W/O P-5'-P-CCNC: 26 U/L (ref 10–44)
ANION GAP SERPL CALC-SCNC: 9 MMOL/L (ref 8–16)
AST SERPL-CCNC: 26 U/L (ref 10–40)
BASOPHILS # BLD AUTO: 0.04 K/UL (ref 0–0.2)
BASOPHILS NFR BLD: 1 % (ref 0–1.9)
BILIRUB SERPL-MCNC: 0.7 MG/DL (ref 0.1–1)
BUN SERPL-MCNC: 18 MG/DL (ref 8–23)
CALCIUM SERPL-MCNC: 10.2 MG/DL (ref 8.7–10.5)
CHLORIDE SERPL-SCNC: 105 MMOL/L (ref 95–110)
CO2 SERPL-SCNC: 28 MMOL/L (ref 23–29)
CREAT SERPL-MCNC: 0.7 MG/DL (ref 0.5–1.4)
CRP SERPL-MCNC: 0.2 MG/L (ref 0–8.2)
DIFFERENTIAL METHOD: ABNORMAL
EOSINOPHIL # BLD AUTO: 0.4 K/UL (ref 0–0.5)
EOSINOPHIL NFR BLD: 10.5 % (ref 0–8)
ERYTHROCYTE [DISTWIDTH] IN BLOOD BY AUTOMATED COUNT: 13.4 % (ref 11.5–14.5)
ERYTHROCYTE [SEDIMENTATION RATE] IN BLOOD BY WESTERGREN METHOD: 8 MM/HR (ref 0–36)
EST. GFR  (AFRICAN AMERICAN): >60 ML/MIN/1.73 M^2
EST. GFR  (NON AFRICAN AMERICAN): >60 ML/MIN/1.73 M^2
GLUCOSE SERPL-MCNC: 87 MG/DL (ref 70–110)
HCT VFR BLD AUTO: 40.8 % (ref 37–48.5)
HGB BLD-MCNC: 13.2 G/DL (ref 12–16)
IMM GRANULOCYTES # BLD AUTO: 0.01 K/UL (ref 0–0.04)
IMM GRANULOCYTES NFR BLD AUTO: 0.3 % (ref 0–0.5)
LYMPHOCYTES # BLD AUTO: 1.4 K/UL (ref 1–4.8)
LYMPHOCYTES NFR BLD: 37.4 % (ref 18–48)
MCH RBC QN AUTO: 31.1 PG (ref 27–31)
MCHC RBC AUTO-ENTMCNC: 32.4 G/DL (ref 32–36)
MCV RBC AUTO: 96 FL (ref 82–98)
MONOCYTES # BLD AUTO: 0.5 K/UL (ref 0.3–1)
MONOCYTES NFR BLD: 12.3 % (ref 4–15)
NEUTROPHILS # BLD AUTO: 1.5 K/UL (ref 1.8–7.7)
NEUTROPHILS NFR BLD: 38.5 % (ref 38–73)
NRBC BLD-RTO: 0 /100 WBC
PLATELET # BLD AUTO: 242 K/UL (ref 150–450)
PMV BLD AUTO: 10.1 FL (ref 9.2–12.9)
POTASSIUM SERPL-SCNC: 4.5 MMOL/L (ref 3.5–5.1)
PROT SERPL-MCNC: 7.7 G/DL (ref 6–8.4)
RBC # BLD AUTO: 4.24 M/UL (ref 4–5.4)
SODIUM SERPL-SCNC: 142 MMOL/L (ref 136–145)
WBC # BLD AUTO: 3.82 K/UL (ref 3.9–12.7)

## 2022-01-27 PROCEDURE — 80053 COMPREHEN METABOLIC PANEL: CPT | Performed by: INTERNAL MEDICINE

## 2022-01-27 PROCEDURE — 85025 COMPLETE CBC W/AUTO DIFF WBC: CPT | Performed by: INTERNAL MEDICINE

## 2022-01-27 PROCEDURE — 99215 OFFICE O/P EST HI 40 MIN: CPT | Mod: S$PBB,,, | Performed by: INTERNAL MEDICINE

## 2022-01-27 PROCEDURE — 85652 RBC SED RATE AUTOMATED: CPT | Performed by: INTERNAL MEDICINE

## 2022-01-27 PROCEDURE — 86140 C-REACTIVE PROTEIN: CPT | Performed by: INTERNAL MEDICINE

## 2022-01-27 PROCEDURE — 99999 PR PBB SHADOW E&M-EST. PATIENT-LVL III: ICD-10-PCS | Mod: PBBFAC,,, | Performed by: INTERNAL MEDICINE

## 2022-01-27 PROCEDURE — 99213 OFFICE O/P EST LOW 20 MIN: CPT | Mod: PBBFAC | Performed by: INTERNAL MEDICINE

## 2022-01-27 PROCEDURE — 99215 PR OFFICE/OUTPT VISIT, EST, LEVL V, 40-54 MIN: ICD-10-PCS | Mod: S$PBB,,, | Performed by: INTERNAL MEDICINE

## 2022-01-27 PROCEDURE — 99999 PR PBB SHADOW E&M-EST. PATIENT-LVL III: CPT | Mod: PBBFAC,,, | Performed by: INTERNAL MEDICINE

## 2022-01-27 PROCEDURE — 36415 COLL VENOUS BLD VENIPUNCTURE: CPT | Performed by: INTERNAL MEDICINE

## 2022-01-27 RX ORDER — LEFLUNOMIDE 20 MG/1
TABLET ORAL
Qty: 16 TABLET | Refills: 3 | Status: SHIPPED | OUTPATIENT
Start: 2022-01-27 | End: 2022-10-11 | Stop reason: SDUPTHER

## 2022-01-27 RX ORDER — DULOXETIN HYDROCHLORIDE 60 MG/1
60 CAPSULE, DELAYED RELEASE ORAL DAILY
Qty: 30 CAPSULE | Refills: 6 | Status: SHIPPED | OUTPATIENT
Start: 2022-01-27 | End: 2022-08-15

## 2022-01-27 NOTE — PROGRESS NOTES
RHEUMATOLOGY CLINIC FOLLOW UP VISIT-1st visit with me.  Chief complaints, HPI, ROS, EXAM, Assessment & Plans:-  Maricarmen Ho a 66 y.o. pleasant female comes in for follow-up for plaque psoriasis and psoriatic arthritis.  She complains of worsening left-sided sciatica.  In her 30 she had a significant injury which initiated the problem of sciatica.  The pain resolved within several months.  Recently the pain has returned with intermittent worsening..  No inflammatory back pain.  Well controlled plaque psoriasis and psoriatic arthritis on Humira and leflunomide.  With the newly flaring sciatica she has started taking Celebrex.  Rheumatological review of system negative otherwise.  Exam shows no synovitis, dactylitis, enthesitis or effusion.  1. Psoriatic arthritis    2. Psoriasis    3. Immunocompromised    4. Encounter for long-term (current) use of high-risk medication    5. Sciatica of left side      Problem List Items Addressed This Visit     Psoriatic arthritis - Primary    Relevant Medications    leflunomide (ARAVA) 20 MG Tab    Psoriasis    Encounter for long-term (current) use of high-risk medication    Immunocompromised    Sciatica of left side    Relevant Orders    Ambulatory referral/consult to [facility]           Well controlled plaque psoriasis and psoriatic arthritis on Arava and Humira.  Continue low-dose Arava along with Humira.   Compromised immune system secondary to autoimmune disease and use of immunosuppressive drugs. Monitor carefully for infections. Advised to get immediate medical care if any infection. Also advised strict adherence to age appropriate vaccinations and cancer screenings with PCP.   Hold Humira and Arava if any infection.   Safety labs for Arava every 12 weeks.     Safety labs normal from today.   Repeat labs in 3 months and before next visit in 6 months.   Referral sent to back in spine clinic  for sciatica.  # Follow up in about 6 months (around 7/27/2022).      Disclaimer: This note was prepared using voice recognition system and is likely to have sound alike errors and is not proof read.  Please call me with any questions.

## 2022-01-28 ENCOUNTER — TELEPHONE (OUTPATIENT)
Dept: PAIN MEDICINE | Facility: CLINIC | Age: 67
End: 2022-01-28
Payer: MEDICARE

## 2022-03-17 ENCOUNTER — TELEPHONE (OUTPATIENT)
Dept: PAIN MEDICINE | Facility: CLINIC | Age: 67
End: 2022-03-17
Payer: MEDICARE

## 2022-03-23 ENCOUNTER — PATIENT MESSAGE (OUTPATIENT)
Dept: RESEARCH | Facility: HOSPITAL | Age: 67
End: 2022-03-23
Payer: MEDICARE

## 2022-04-12 ENCOUNTER — LAB VISIT (OUTPATIENT)
Dept: LAB | Facility: HOSPITAL | Age: 67
End: 2022-04-12
Attending: FAMILY MEDICINE
Payer: COMMERCIAL

## 2022-04-12 DIAGNOSIS — Z00.00 ROUTINE HEALTH MAINTENANCE: ICD-10-CM

## 2022-04-12 DIAGNOSIS — E03.9 HYPOTHYROIDISM, UNSPECIFIED TYPE: ICD-10-CM

## 2022-04-12 DIAGNOSIS — L40.50 PSORIATIC ARTHRITIS: ICD-10-CM

## 2022-04-12 DIAGNOSIS — D84.9 IMMUNOCOMPROMISED: ICD-10-CM

## 2022-04-12 LAB
ALBUMIN SERPL BCP-MCNC: 3.8 G/DL (ref 3.5–5.2)
ALP SERPL-CCNC: 57 U/L (ref 55–135)
ALT SERPL W/O P-5'-P-CCNC: 28 U/L (ref 10–44)
ANION GAP SERPL CALC-SCNC: 11 MMOL/L (ref 8–16)
AST SERPL-CCNC: 24 U/L (ref 10–40)
BASOPHILS # BLD AUTO: 0.05 K/UL (ref 0–0.2)
BASOPHILS NFR BLD: 1.5 % (ref 0–1.9)
BILIRUB SERPL-MCNC: 0.7 MG/DL (ref 0.1–1)
BUN SERPL-MCNC: 15 MG/DL (ref 8–23)
CALCIUM SERPL-MCNC: 10 MG/DL (ref 8.7–10.5)
CHLORIDE SERPL-SCNC: 106 MMOL/L (ref 95–110)
CHOLEST SERPL-MCNC: 293 MG/DL (ref 120–199)
CHOLEST/HDLC SERPL: 4 {RATIO} (ref 2–5)
CO2 SERPL-SCNC: 27 MMOL/L (ref 23–29)
CREAT SERPL-MCNC: 0.7 MG/DL (ref 0.5–1.4)
CRP SERPL-MCNC: 0.4 MG/L (ref 0–8.2)
DIFFERENTIAL METHOD: ABNORMAL
EOSINOPHIL # BLD AUTO: 0.4 K/UL (ref 0–0.5)
EOSINOPHIL NFR BLD: 12 % (ref 0–8)
ERYTHROCYTE [DISTWIDTH] IN BLOOD BY AUTOMATED COUNT: 14 % (ref 11.5–14.5)
ERYTHROCYTE [SEDIMENTATION RATE] IN BLOOD BY WESTERGREN METHOD: 7 MM/HR (ref 0–36)
EST. GFR  (AFRICAN AMERICAN): >60 ML/MIN/1.73 M^2
EST. GFR  (NON AFRICAN AMERICAN): >60 ML/MIN/1.73 M^2
GLUCOSE SERPL-MCNC: 93 MG/DL (ref 70–110)
HCT VFR BLD AUTO: 39.9 % (ref 37–48.5)
HDLC SERPL-MCNC: 74 MG/DL (ref 40–75)
HDLC SERPL: 25.3 % (ref 20–50)
HGB BLD-MCNC: 13 G/DL (ref 12–16)
IMM GRANULOCYTES # BLD AUTO: 0.01 K/UL (ref 0–0.04)
IMM GRANULOCYTES NFR BLD AUTO: 0.3 % (ref 0–0.5)
LDLC SERPL CALC-MCNC: 206.2 MG/DL (ref 63–159)
LYMPHOCYTES # BLD AUTO: 1.4 K/UL (ref 1–4.8)
LYMPHOCYTES NFR BLD: 39.5 % (ref 18–48)
MCH RBC QN AUTO: 31.6 PG (ref 27–31)
MCHC RBC AUTO-ENTMCNC: 32.6 G/DL (ref 32–36)
MCV RBC AUTO: 97 FL (ref 82–98)
MONOCYTES # BLD AUTO: 0.4 K/UL (ref 0.3–1)
MONOCYTES NFR BLD: 11.4 % (ref 4–15)
NEUTROPHILS # BLD AUTO: 1.2 K/UL (ref 1.8–7.7)
NEUTROPHILS NFR BLD: 35.3 % (ref 38–73)
NONHDLC SERPL-MCNC: 219 MG/DL
NRBC BLD-RTO: 0 /100 WBC
PLATELET # BLD AUTO: 256 K/UL (ref 150–450)
PMV BLD AUTO: 10.3 FL (ref 9.2–12.9)
POTASSIUM SERPL-SCNC: 4.5 MMOL/L (ref 3.5–5.1)
PROT SERPL-MCNC: 7.2 G/DL (ref 6–8.4)
RBC # BLD AUTO: 4.11 M/UL (ref 4–5.4)
SODIUM SERPL-SCNC: 144 MMOL/L (ref 136–145)
T4 FREE SERPL-MCNC: 0.76 NG/DL (ref 0.71–1.51)
TRIGL SERPL-MCNC: 64 MG/DL (ref 30–150)
TSH SERPL DL<=0.005 MIU/L-ACNC: 8.1 UIU/ML (ref 0.4–4)
WBC # BLD AUTO: 3.42 K/UL (ref 3.9–12.7)

## 2022-04-12 PROCEDURE — 80053 COMPREHEN METABOLIC PANEL: CPT | Performed by: INTERNAL MEDICINE

## 2022-04-12 PROCEDURE — 85652 RBC SED RATE AUTOMATED: CPT | Performed by: INTERNAL MEDICINE

## 2022-04-12 PROCEDURE — 86140 C-REACTIVE PROTEIN: CPT | Performed by: INTERNAL MEDICINE

## 2022-04-12 PROCEDURE — 80061 LIPID PANEL: CPT | Performed by: FAMILY MEDICINE

## 2022-04-12 PROCEDURE — 85025 COMPLETE CBC W/AUTO DIFF WBC: CPT | Performed by: INTERNAL MEDICINE

## 2022-04-12 PROCEDURE — 84439 ASSAY OF FREE THYROXINE: CPT | Performed by: FAMILY MEDICINE

## 2022-04-12 PROCEDURE — 36415 COLL VENOUS BLD VENIPUNCTURE: CPT | Performed by: FAMILY MEDICINE

## 2022-04-12 PROCEDURE — 84443 ASSAY THYROID STIM HORMONE: CPT | Performed by: FAMILY MEDICINE

## 2022-04-28 ENCOUNTER — OFFICE VISIT (OUTPATIENT)
Dept: INTERNAL MEDICINE | Facility: CLINIC | Age: 67
End: 2022-04-28
Payer: MEDICARE

## 2022-04-28 VITALS
DIASTOLIC BLOOD PRESSURE: 82 MMHG | HEART RATE: 93 BPM | WEIGHT: 150.56 LBS | TEMPERATURE: 98 F | OXYGEN SATURATION: 99 % | HEIGHT: 67 IN | SYSTOLIC BLOOD PRESSURE: 138 MMHG | BODY MASS INDEX: 23.63 KG/M2

## 2022-04-28 DIAGNOSIS — E03.9 HYPOTHYROIDISM, UNSPECIFIED TYPE: ICD-10-CM

## 2022-04-28 DIAGNOSIS — Z00.00 ROUTINE HEALTH MAINTENANCE: Primary | ICD-10-CM

## 2022-04-28 DIAGNOSIS — E78.00 HYPERCHOLESTEREMIA: ICD-10-CM

## 2022-04-28 DIAGNOSIS — L40.50 PSORIATIC ARTHRITIS: ICD-10-CM

## 2022-04-28 DIAGNOSIS — Z78.0 ASYMPTOMATIC POSTMENOPAUSAL STATUS: ICD-10-CM

## 2022-04-28 DIAGNOSIS — D84.9 IMMUNOCOMPROMISED: ICD-10-CM

## 2022-04-28 PROCEDURE — 99999 PR PBB SHADOW E&M-EST. PATIENT-LVL V: ICD-10-PCS | Mod: PBBFAC,,, | Performed by: FAMILY MEDICINE

## 2022-04-28 PROCEDURE — 99215 OFFICE O/P EST HI 40 MIN: CPT | Mod: PBBFAC | Performed by: FAMILY MEDICINE

## 2022-04-28 PROCEDURE — 99999 PR PBB SHADOW E&M-EST. PATIENT-LVL V: CPT | Mod: PBBFAC,,, | Performed by: FAMILY MEDICINE

## 2022-04-28 PROCEDURE — 99397 PER PM REEVAL EST PAT 65+ YR: CPT | Mod: S$PBB,GZ,, | Performed by: FAMILY MEDICINE

## 2022-04-28 PROCEDURE — 99397 PR PREVENTIVE VISIT,EST,65 & OVER: ICD-10-PCS | Mod: S$PBB,GZ,, | Performed by: FAMILY MEDICINE

## 2022-04-28 RX ORDER — LEVOTHYROXINE SODIUM 100 UG/1
100 TABLET ORAL
Qty: 90 TABLET | Refills: 4 | Status: SHIPPED | OUTPATIENT
Start: 2022-04-28 | End: 2023-05-01

## 2022-04-28 NOTE — PROGRESS NOTES
Subjective:       Patient ID: Maricarmen Becerril is a . female.    Chief Complaint: here for physical examination and issues  below    HPI THypothyroidism:. Tolerating med. Asympt;l tsh 8    psor arth sees rheum    hyperchol cv risk >7.5% . Higher this year    prev d/wd 2011 mri show liver lesions most likely hemangiomas w recomm fede; d/w this and reason for but she declined    intersit lungdx/nodules pulm  due 7/17 but ct chest and pfts ok and no change sympt so she defers f/u pulm          Past Medical History:   Diagnosis Date    Acid reflux     Allergic rhinitis     Allergy     Arthritis     Dry mouth     Heart murmur     Hypothyroidism     SECONDARY    Lung disease     mtx related    Positive SANA (antinuclear antibody)     Psoriatic arthritis    hyperchol  Past Surgical History:   Procedure Laterality Date    DILATION AND CURETTAGE OF UTERUS      MANDIBLE FRACTURE SURGERY      OTHER SURGICAL HISTORY      uterine suspension    THYROIDECTOMY      TONSILLECTOMY       Family History   Problem Relation Age of Onset    Parkinsonism Mother     Cataracts Mother     Gout Father     Hypothyroidism Father     Cataracts Father     Hypertension Father      Social History     Socioeconomic History    Marital status:      Spouse name: BOLIVAR    Number of children: Not on file    Years of education: Not on file    Highest education level: Not on file   Occupational History     Employer: SELF-EMPLOYED   Social Needs    Financial resource strain: Not hard at all    Food insecurity:     Worry: Never true     Inability: Never true    Transportation needs:     Medical: No     Non-medical: No   Tobacco Use    Smoking status: Never Smoker    Smokeless tobacco: Never Used   Substance and Sexual Activity    Alcohol use: No     Frequency: Never     Binge frequency: Never    Drug use: No    Sexual activity: Not on file   Lifestyle    Physical activity:     Days per week: 2 days     Minutes  per session: 20 min    Stress: Not at all   Relationships    Social connections:     Talks on phone: Once a week     Gets together: More than three times a week     Attends Sabianism service: Not on file     Active member of club or organization: No     Attends meetings of clubs or organizations: Never     Relationship status:    Other Topics Concern    Not on file   Social History Narrative    Not on file           Review of Systems  Cardiovascular: no chest pain    Abd: no abd pain  Remainder review of systems negative    Objective:      Physical Exam   Constitutional: She is oriented to person, place, and time. She appears well-developed and well-nourished. No distress.   HENT:   Head: Atraumatic.   Right Ear: External ear normal.   Left Ear: External ear normal.   cvrrr  Chest ctabilat  Abd+bs soft ntnd no hsm nomass     Neurological: She is alert and oriented to person, place, and time. No cranial nerve deficit. She exhibits normal muscle tone. Coordination normal.   Skin: Skin is warm. No rash noted. No erythema. No pallor.   Psychiatric: She has a normal mood and affect. Her behavior is normal. Judgment and thought content normal.   Nursing note and vitals reviewed.      Assessment:       1. Routine health maintenance    2. Hypothyroidism, unspecified hypothyroidism type          Psori. Arthritis    abnl tsh  hyperchol  Plan:     r  F/u rheum when due  .**  Routine health maintenance    Hypothyroidism, unspecified type  -     TSH; Future; Expected date: 04/28/2023  -     TSH; Future; Expected date: 06/12/2022    Psoriatic arthritis    Immunocompromised    Hypercholesteremia  -     Comprehensive Metabolic Panel; Future; Expected date: 04/28/2023  -     Lipid Panel; Future; Expected date: 04/28/2023  -     Ambulatory referral/consult to Genetics; Future; Expected date: 05/05/2022  -     Lipid Panel; Future; Expected date: 06/12/2022    Asymptomatic postmenopausal status  -     DXA Bone Density Spine  And Hip; Future; Expected date: 04/28/2022    Other orders  -     levothyroxine (SYNTHROID) 100 MCG tablet; Take 1 tablet (100 mcg total) by mouth before breakfast.  Dispense: 90 tablet; Refill: 4

## 2022-05-23 ENCOUNTER — APPOINTMENT (OUTPATIENT)
Dept: RADIOLOGY | Facility: HOSPITAL | Age: 67
End: 2022-05-23
Attending: FAMILY MEDICINE
Payer: MEDICARE

## 2022-05-23 DIAGNOSIS — Z78.0 ASYMPTOMATIC POSTMENOPAUSAL STATUS: ICD-10-CM

## 2022-05-23 PROCEDURE — 77080 DXA BONE DENSITY AXIAL: CPT | Mod: 26,,, | Performed by: RADIOLOGY

## 2022-05-23 PROCEDURE — 77080 DXA BONE DENSITY AXIAL: CPT | Mod: TC

## 2022-05-23 PROCEDURE — 77080 DEXA BONE DENSITY SPINE HIP: ICD-10-PCS | Mod: 26,,, | Performed by: RADIOLOGY

## 2022-06-15 ENCOUNTER — LAB VISIT (OUTPATIENT)
Dept: LAB | Facility: HOSPITAL | Age: 67
End: 2022-06-15
Attending: FAMILY MEDICINE
Payer: COMMERCIAL

## 2022-06-15 DIAGNOSIS — E03.9 HYPOTHYROIDISM, UNSPECIFIED TYPE: ICD-10-CM

## 2022-06-15 LAB
T4 FREE SERPL-MCNC: 0.71 NG/DL (ref 0.71–1.51)
TSH SERPL DL<=0.005 MIU/L-ACNC: 6.57 UIU/ML (ref 0.4–4)

## 2022-06-15 PROCEDURE — 84443 ASSAY THYROID STIM HORMONE: CPT | Performed by: FAMILY MEDICINE

## 2022-06-15 PROCEDURE — 84439 ASSAY OF FREE THYROXINE: CPT | Performed by: FAMILY MEDICINE

## 2022-06-15 PROCEDURE — 36415 COLL VENOUS BLD VENIPUNCTURE: CPT | Performed by: FAMILY MEDICINE

## 2022-07-08 DIAGNOSIS — E03.9 HYPOTHYROIDISM, UNSPECIFIED TYPE: Primary | ICD-10-CM

## 2022-08-16 ENCOUNTER — LAB VISIT (OUTPATIENT)
Dept: LAB | Facility: HOSPITAL | Age: 67
End: 2022-08-16
Attending: INTERNAL MEDICINE
Payer: MEDICARE

## 2022-08-16 DIAGNOSIS — D84.9 IMMUNOCOMPROMISED: ICD-10-CM

## 2022-08-16 DIAGNOSIS — L40.50 PSORIATIC ARTHRITIS: ICD-10-CM

## 2022-08-16 LAB
ALBUMIN SERPL BCP-MCNC: 3.9 G/DL (ref 3.5–5.2)
ALP SERPL-CCNC: 62 U/L (ref 55–135)
ALT SERPL W/O P-5'-P-CCNC: 21 U/L (ref 10–44)
ANION GAP SERPL CALC-SCNC: 9 MMOL/L (ref 8–16)
AST SERPL-CCNC: 24 U/L (ref 10–40)
BASOPHILS # BLD AUTO: 0.05 K/UL (ref 0–0.2)
BASOPHILS NFR BLD: 0.9 % (ref 0–1.9)
BILIRUB SERPL-MCNC: 0.7 MG/DL (ref 0.1–1)
BUN SERPL-MCNC: 19 MG/DL (ref 8–23)
CALCIUM SERPL-MCNC: 10.3 MG/DL (ref 8.7–10.5)
CHLORIDE SERPL-SCNC: 103 MMOL/L (ref 95–110)
CO2 SERPL-SCNC: 26 MMOL/L (ref 23–29)
CREAT SERPL-MCNC: 0.7 MG/DL (ref 0.5–1.4)
CRP SERPL-MCNC: 0.5 MG/L (ref 0–8.2)
DIFFERENTIAL METHOD: ABNORMAL
EOSINOPHIL # BLD AUTO: 0.3 K/UL (ref 0–0.5)
EOSINOPHIL NFR BLD: 5.2 % (ref 0–8)
ERYTHROCYTE [DISTWIDTH] IN BLOOD BY AUTOMATED COUNT: 13.4 % (ref 11.5–14.5)
ERYTHROCYTE [SEDIMENTATION RATE] IN BLOOD BY PHOTOMETRIC METHOD: 14 MM/HR (ref 0–36)
EST. GFR  (NO RACE VARIABLE): >60 ML/MIN/1.73 M^2
GLUCOSE SERPL-MCNC: 91 MG/DL (ref 70–110)
HCT VFR BLD AUTO: 40.6 % (ref 37–48.5)
HGB BLD-MCNC: 13.1 G/DL (ref 12–16)
IMM GRANULOCYTES # BLD AUTO: 0.05 K/UL (ref 0–0.04)
IMM GRANULOCYTES NFR BLD AUTO: 0.9 % (ref 0–0.5)
LYMPHOCYTES # BLD AUTO: 1.4 K/UL (ref 1–4.8)
LYMPHOCYTES NFR BLD: 25 % (ref 18–48)
MCH RBC QN AUTO: 30.5 PG (ref 27–31)
MCHC RBC AUTO-ENTMCNC: 32.3 G/DL (ref 32–36)
MCV RBC AUTO: 95 FL (ref 82–98)
MONOCYTES # BLD AUTO: 0.6 K/UL (ref 0.3–1)
MONOCYTES NFR BLD: 11.5 % (ref 4–15)
NEUTROPHILS # BLD AUTO: 3 K/UL (ref 1.8–7.7)
NEUTROPHILS NFR BLD: 56.5 % (ref 38–73)
NRBC BLD-RTO: 0 /100 WBC
PLATELET # BLD AUTO: 225 K/UL (ref 150–450)
PMV BLD AUTO: 10.4 FL (ref 9.2–12.9)
POTASSIUM SERPL-SCNC: 5.2 MMOL/L (ref 3.5–5.1)
PROT SERPL-MCNC: 7.5 G/DL (ref 6–8.4)
RBC # BLD AUTO: 4.29 M/UL (ref 4–5.4)
SODIUM SERPL-SCNC: 138 MMOL/L (ref 136–145)
WBC # BLD AUTO: 5.39 K/UL (ref 3.9–12.7)

## 2022-08-16 PROCEDURE — 86140 C-REACTIVE PROTEIN: CPT | Performed by: INTERNAL MEDICINE

## 2022-08-16 PROCEDURE — 80053 COMPREHEN METABOLIC PANEL: CPT | Performed by: INTERNAL MEDICINE

## 2022-08-16 PROCEDURE — 36415 COLL VENOUS BLD VENIPUNCTURE: CPT | Performed by: INTERNAL MEDICINE

## 2022-08-16 PROCEDURE — 85025 COMPLETE CBC W/AUTO DIFF WBC: CPT | Performed by: INTERNAL MEDICINE

## 2022-08-16 PROCEDURE — 85652 RBC SED RATE AUTOMATED: CPT | Performed by: INTERNAL MEDICINE

## 2022-08-17 ENCOUNTER — OFFICE VISIT (OUTPATIENT)
Dept: RHEUMATOLOGY | Facility: CLINIC | Age: 67
End: 2022-08-17
Payer: MEDICARE

## 2022-08-17 VITALS
HEIGHT: 67 IN | BODY MASS INDEX: 23.32 KG/M2 | DIASTOLIC BLOOD PRESSURE: 84 MMHG | HEART RATE: 85 BPM | WEIGHT: 148.56 LBS | SYSTOLIC BLOOD PRESSURE: 132 MMHG

## 2022-08-17 DIAGNOSIS — L40.50 PSORIATIC ARTHRITIS: ICD-10-CM

## 2022-08-17 DIAGNOSIS — M70.62 TROCHANTERIC BURSITIS OF BOTH HIPS: ICD-10-CM

## 2022-08-17 DIAGNOSIS — D84.9 IMMUNOCOMPROMISED: ICD-10-CM

## 2022-08-17 DIAGNOSIS — Z79.899 ENCOUNTER FOR LONG-TERM (CURRENT) USE OF HIGH-RISK MEDICATION: ICD-10-CM

## 2022-08-17 DIAGNOSIS — L40.9 PSORIASIS: Primary | ICD-10-CM

## 2022-08-17 DIAGNOSIS — M70.61 TROCHANTERIC BURSITIS OF BOTH HIPS: ICD-10-CM

## 2022-08-17 PROCEDURE — 99215 PR OFFICE/OUTPT VISIT, EST, LEVL V, 40-54 MIN: ICD-10-PCS | Mod: S$PBB,,, | Performed by: INTERNAL MEDICINE

## 2022-08-17 PROCEDURE — 99213 OFFICE O/P EST LOW 20 MIN: CPT | Mod: PBBFAC | Performed by: INTERNAL MEDICINE

## 2022-08-17 PROCEDURE — 99215 OFFICE O/P EST HI 40 MIN: CPT | Mod: S$PBB,,, | Performed by: INTERNAL MEDICINE

## 2022-08-17 PROCEDURE — 99999 PR PBB SHADOW E&M-EST. PATIENT-LVL III: CPT | Mod: PBBFAC,,, | Performed by: INTERNAL MEDICINE

## 2022-08-17 PROCEDURE — 99999 PR PBB SHADOW E&M-EST. PATIENT-LVL III: ICD-10-PCS | Mod: PBBFAC,,, | Performed by: INTERNAL MEDICINE

## 2022-08-17 NOTE — PROGRESS NOTES
RHEUMATOLOGY CLINIC FOLLOW UP VISIT  Chief complaints, HPI, ROS, EXAM, Assessment & Plans:-  Maricarmen Ho a 67 y.o. pleasant female comes in for follow-up for plaque psoriasis and psoriatic arthritis.  She complains of worsening left-sided sciatica.  In her 30 she had a significant injury which initiated the problem of sciatica.  The pain resolved within several months.  Recently the pain has returned with intermittent worsening..  No inflammatory back pain.  Well controlled plaque psoriasis and psoriatic arthritis on Humira and leflunomide. Rheumatological review of system negative otherwise.  Exam shows no synovitis, dactylitis, enthesitis or effusion.  1. Psoriasis    2. Psoriatic arthritis    3. Immunocompromised    4. Encounter for long-term (current) use of high-risk medication    5. Trochanteric bursitis of both hips      Problem List Items Addressed This Visit     Psoriatic arthritis    Psoriasis - Primary    Encounter for long-term (current) use of high-risk medication    Immunocompromised    Trochanteric bursitis of both hips    Relevant Orders    Ambulatory referral/consult to Physical/Occupational Therapy           Well controlled plaque psoriasis and psoriatic arthritis on Arava and Humira.  Continue low-dose Arava along with Humira.   Compromised immune system secondary to autoimmune disease and use of immunosuppressive drugs. Monitor carefully for infections. Advised to get immediate medical care if any infection. Also advised strict adherence to age appropriate vaccinations and cancer screenings with PCP.   Hold Humira and Arava if any infection.   Safety labs for Arava every 12 weeks.     Safety labs normal from today.   Repeat labs in 3 months and before next visit in 6 months.   PT referral for trochanteric bursitis.   # Follow up in about 6 months (around 2/17/2023).      Disclaimer: This note was prepared using voice  recognition system and is likely to have sound alike errors and is not proof read.  Please call me with any questions.

## 2022-08-23 ENCOUNTER — LAB VISIT (OUTPATIENT)
Dept: LAB | Facility: HOSPITAL | Age: 67
End: 2022-08-23
Attending: INTERNAL MEDICINE
Payer: MEDICARE

## 2022-08-23 DIAGNOSIS — L40.50 PSORIATIC ARTHRITIS: ICD-10-CM

## 2022-08-23 DIAGNOSIS — E03.9 HYPOTHYROIDISM, UNSPECIFIED TYPE: ICD-10-CM

## 2022-08-23 DIAGNOSIS — D84.9 IMMUNOCOMPROMISED: ICD-10-CM

## 2022-08-23 LAB
ALBUMIN SERPL BCP-MCNC: 3.8 G/DL (ref 3.5–5.2)
ALP SERPL-CCNC: 61 U/L (ref 55–135)
ALT SERPL W/O P-5'-P-CCNC: 26 U/L (ref 10–44)
ANION GAP SERPL CALC-SCNC: 10 MMOL/L (ref 8–16)
AST SERPL-CCNC: 23 U/L (ref 10–40)
BASOPHILS # BLD AUTO: 0.04 K/UL (ref 0–0.2)
BASOPHILS NFR BLD: 1 % (ref 0–1.9)
BILIRUB SERPL-MCNC: 0.6 MG/DL (ref 0.1–1)
BUN SERPL-MCNC: 18 MG/DL (ref 8–23)
CALCIUM SERPL-MCNC: 10.4 MG/DL (ref 8.7–10.5)
CHLORIDE SERPL-SCNC: 106 MMOL/L (ref 95–110)
CO2 SERPL-SCNC: 28 MMOL/L (ref 23–29)
CREAT SERPL-MCNC: 0.7 MG/DL (ref 0.5–1.4)
CRP SERPL-MCNC: 0.2 MG/L (ref 0–8.2)
DIFFERENTIAL METHOD: ABNORMAL
EOSINOPHIL # BLD AUTO: 0.3 K/UL (ref 0–0.5)
EOSINOPHIL NFR BLD: 7.1 % (ref 0–8)
ERYTHROCYTE [DISTWIDTH] IN BLOOD BY AUTOMATED COUNT: 13.5 % (ref 11.5–14.5)
ERYTHROCYTE [SEDIMENTATION RATE] IN BLOOD BY PHOTOMETRIC METHOD: 14 MM/HR (ref 0–36)
EST. GFR  (NO RACE VARIABLE): >60 ML/MIN/1.73 M^2
GLUCOSE SERPL-MCNC: 107 MG/DL (ref 70–110)
HCT VFR BLD AUTO: 40.6 % (ref 37–48.5)
HGB BLD-MCNC: 13 G/DL (ref 12–16)
IMM GRANULOCYTES # BLD AUTO: 0 K/UL (ref 0–0.04)
IMM GRANULOCYTES NFR BLD AUTO: 0 % (ref 0–0.5)
LYMPHOCYTES # BLD AUTO: 1.7 K/UL (ref 1–4.8)
LYMPHOCYTES NFR BLD: 40.9 % (ref 18–48)
MCH RBC QN AUTO: 30.5 PG (ref 27–31)
MCHC RBC AUTO-ENTMCNC: 32 G/DL (ref 32–36)
MCV RBC AUTO: 95 FL (ref 82–98)
MONOCYTES # BLD AUTO: 0.4 K/UL (ref 0.3–1)
MONOCYTES NFR BLD: 9.9 % (ref 4–15)
NEUTROPHILS # BLD AUTO: 1.7 K/UL (ref 1.8–7.7)
NEUTROPHILS NFR BLD: 41.1 % (ref 38–73)
NRBC BLD-RTO: 0 /100 WBC
PLATELET # BLD AUTO: 218 K/UL (ref 150–450)
PMV BLD AUTO: 10 FL (ref 9.2–12.9)
POTASSIUM SERPL-SCNC: 4.2 MMOL/L (ref 3.5–5.1)
PROT SERPL-MCNC: 7.4 G/DL (ref 6–8.4)
RBC # BLD AUTO: 4.26 M/UL (ref 4–5.4)
SODIUM SERPL-SCNC: 144 MMOL/L (ref 136–145)
TSH SERPL DL<=0.005 MIU/L-ACNC: 0.56 UIU/ML (ref 0.4–4)
WBC # BLD AUTO: 4.06 K/UL (ref 3.9–12.7)

## 2022-08-23 PROCEDURE — 84443 ASSAY THYROID STIM HORMONE: CPT | Performed by: FAMILY MEDICINE

## 2022-08-23 PROCEDURE — 86140 C-REACTIVE PROTEIN: CPT | Performed by: INTERNAL MEDICINE

## 2022-08-23 PROCEDURE — 85652 RBC SED RATE AUTOMATED: CPT | Performed by: INTERNAL MEDICINE

## 2022-08-23 PROCEDURE — 85025 COMPLETE CBC W/AUTO DIFF WBC: CPT | Performed by: INTERNAL MEDICINE

## 2022-08-23 PROCEDURE — 80053 COMPREHEN METABOLIC PANEL: CPT | Performed by: INTERNAL MEDICINE

## 2022-08-23 PROCEDURE — 36415 COLL VENOUS BLD VENIPUNCTURE: CPT | Performed by: FAMILY MEDICINE

## 2022-10-11 ENCOUNTER — CLINICAL SUPPORT (OUTPATIENT)
Dept: REHABILITATION | Facility: HOSPITAL | Age: 67
End: 2022-10-11
Payer: COMMERCIAL

## 2022-10-11 DIAGNOSIS — M70.62 TROCHANTERIC BURSITIS OF BOTH HIPS: ICD-10-CM

## 2022-10-11 DIAGNOSIS — M25.551 BILATERAL HIP PAIN: Primary | ICD-10-CM

## 2022-10-11 DIAGNOSIS — M70.61 TROCHANTERIC BURSITIS OF BOTH HIPS: ICD-10-CM

## 2022-10-11 DIAGNOSIS — M25.552 BILATERAL HIP PAIN: Primary | ICD-10-CM

## 2022-10-11 DIAGNOSIS — L40.50 PSORIATIC ARTHRITIS: ICD-10-CM

## 2022-10-11 PROCEDURE — 97162 PT EVAL MOD COMPLEX 30 MIN: CPT

## 2022-10-11 RX ORDER — LEFLUNOMIDE 20 MG/1
TABLET ORAL
Qty: 16 TABLET | Refills: 3 | Status: SHIPPED | OUTPATIENT
Start: 2022-10-11 | End: 2022-12-14 | Stop reason: SDUPTHER

## 2022-10-11 NOTE — PLAN OF CARE
[facility]  Physical Therapy Initial Evaluation     Date: 10/11/2022   Name: Maricarmen Becerril  Clinic Number: [id number]    Therapy Diagnosis:   Encounter Diagnoses   Name Primary?    Trochanteric bursitis of both hips     Bilateral hip pain Yes     Physician: Donis Monroy,*    Physician Orders: PT Eval and Treat   Medical Diagnosis from Referral: M70.61,M70.62 (ICD-10-CM) - Trochanteric bursitis of both hips  Evaluation Date: 10/11/2022  Authorization Period Expiration: 8/17/2023  Plan of Care Expiration: 12/11/2022  Progress Note Due: 11/11/2022  Visit # / Visits authorized: 1/1   FOTO: 1/3     Time In: 10:30am  Time Out: 11:15am  Total Appointment Time (timed & untimed codes): 45 minutes    Precautions: Standard    Surgery: None    SUBJECTIVE   Date of onset: few months   History of current condition - Maricarmen reports: She was told bursitis but she thought she was having more sciatic pain because she has had that before. She states it has been going on for a while. She states she recently has had pain every day and that is why she has decided to come to therapy. She states she admits that she doesn't take care of stuff early on and lets the pain get too bad. She states the pain is in both hips then goes down her legs at times.      Imaging, none    Prior Therapy: none recently  Social History: lives with their spouse  Occupation: Retired  Prior Level of Function: housework fully  Current Level of Function: limited heavy housework    Pain:  Current 0/10, worst 5/10, best 0/10   Location: bilateral hips, left worse than right   Description: Aching  Aggravating Factors: Lifting and Extended periods in one position   Easing Factors: rest    Pts goals: decrease pain, get stronger to lift laundry     Medical History:   Past Medical History:   Diagnosis Date    Acid reflux     Allergic rhinitis     Allergy     Arthritis     Dry mouth     Heart murmur     Hypercholesteremia      Hypothyroidism     SECONDARY    Lung disease     mtx related    PONV (postoperative nausea and vomiting)     Positive SANA (antinuclear antibody)     Psoriatic arthritis        Surgical History:   Maricarmen Becerril  has a past surgical history that includes Thyroidectomy; Tonsillectomy; Mandible fracture surgery; Other surgical history; Dilation and curettage of uterus; and Colonoscopy (N/A, 5/27/2021).    Medications:   Maricarmen has a current medication list which includes the following prescription(s): humira(cf) pen, cholecalciferol (vitamin d3), duloxetine, fish oil-omega-3 fatty acids, leflunomide, levothyroxine, multivit/folic acid/vit k1, mupirocin, and turmeric.    Allergies:   Review of patient's allergies indicates:   Allergen Reactions    Codeine      Other reaction(s): Unknown    Methotrexate      Other reaction(s): mtx lung dz    Tramadol      Other reaction(s): nausea        OBJECTIVE     Sensation:  Sensation is intact to light touch    (WFL: Within Functional Limits)     ROM   Right (degrees) Left (degrees)   Lumbar Flexion  100% 100%   Lumbar Extension 100% 100%   Lumbar Sidebending 100% tension 100% tension   Lumbar Rotation 100% pain in hips 100% pain in hips     Hip Flexion (125) WFL WFL   Hip Extension (15) WFL WFL   Hip Internal Rotation (45) WFL WFL pain   Hip External Rotation (45) WFL WFL pain   Hip Abduction (45) WFL WFL   Knee Flexion (140) WFL WFL   Knee Extension (0) WFL WFL     Strength   Right    Left   Gluteus Jackson 4/5 4/5   Gluteus Medius 4-/5 3+/5   Hip Adductors  4-/5 4-/5   Psoas 4/5 4-/5   Quadriceps 4+/5 4/5   Hamstrings 4+/5 4/5   Anterior Tibialis 5/5 5/5   Gastroc/Soleus 5/5 5/5   Transverse Abdominis 3+/5 3+/5     Special Tests:   Test Right Left   Lance Test  positive positive   Lamberto positive positive   MAGALI  negative negative   FADIR  negative negative   Scour negative negative   SLUMP positive positive     Muscle Length: decreased hamstrings bilaterally      Palpation: tender at bilateral piriformis, left more than right    Movement Analysis:   Test Right Left   Sit to Stand Uses arms, knee valgus bilaterally    Step Tap Knee Valgus Knee Valgus   Single Leg Balance Unstable Unstable/Pain     Gait Analysis: The patient ambulated with the use of none and presents with the following gait abnormalities: compensated trandelenberg      Function:     CMS Impairment/Limitation/Restriction for FOTO Hip Survey    Therapist reviewed FOTO scores for Maricarmen Becerril on 10/11/2022.   FOTO documents entered into [employer] - see Media section.    Limitation Score: 56%       TREATMENT     Maricarmen received therapeutic exercises to develop strength, endurance, ROM, posture, and core stabilization for 0 minutes including:    Maricarmen received the following manual therapy techniques: Joint mobilizations, Myofacial release, and Soft tissue Mobilization were applied to the: bilateral hip for 0 minutes, including:    Maricarmen participated in neuromuscular re-education activities to improve: Balance, Coordination, Proprioception, and Posture for 0 minutes. The following activities were included:    Maricarmen participated in dynamic functional therapeutic activities to improve functional performance for 0 minutes, including:    PATIENT EDUCATION AND HOME EXERCISES     Education provided:   - bridges, sidelying clams, and piriformis stretch     Written Home Exercises Provided: yes.  Exercises were reviewed and Maricarmen was able to demonstrate them prior to the end of the session.  Maricarmen demonstrated good  understanding of the education provided.     See EMR under Patient Instructions for exercises provided 10/11/2022.    ASSESSMENT   Maricarmen is a 67 y.o. female referred to outpatient Physical Therapy with a medical diagnosis of M70.61,M70.62 (ICD-10-CM) - Trochanteric bursitis of both hips. The patient presents with impairments which include decreased ROM, decreased strength, decreased muscle  length, impaired coordination, impaired balance, gait abnormalities, and decreased overall function.  These impairments are limiting patient's ability to perform heavy house work, lift heavy objects, and stay in one position for long periods. Pt prognosis is Good due to personal factors and co-morbidities listed below. Pt will benefit from skilled outpatient Physical Therapy to address the deficits stated above and in the chart below, provide pt/family education, and to maximize pt's level of independence.     Plan of care discussed with patient: Yes  Pt's spiritual, cultural and educational needs considered and patient is agreeable to the plan of care and goals as stated below:     Anticipated Barriers for therapy: co-morbidities     Medical Necessity is demonstrated by the following  History  Co-morbidities and personal factors that may impact the plan of care Co-morbidities:   advanced age, coping style/mechanism, level of undertstanding of current condition, and poor medication/medical compliance    Personal Factors:   age  lifestyle     moderate   Examination  Body Structures and Functions, activity limitations and participation restrictions that may impact the plan of care Body Regions:   back  lower extremities    Body Systems:    ROM  strength  gross coordinated movement  balance  gait  transfers    Participation Restrictions:   Heavy House work    Activity limitations:   Learning and applying knowledge  no deficits    General Tasks and Commands  no deficits    Communication  no deficits    Mobility  lifting and carrying objects  walking    Self care  no deficits    Domestic Life  doing house work (cleaning house, washing dishes, laundry)    Interactions/Relationships  no deficits    Life Areas  no deficits    Community and Social Life  community life  recreation and leisure         moderate   Clinical Presentation evolving clinical presentation with changing clinical characteristics moderate   Decision  Making/ Complexity Score: moderate     Goals:  Short Term Goals: 4 weeks   1. Recent signs and systems trend is improving in order to progress towards LTG's.  2. Patient will be independent with HEP in order to further progress and return to maximal function.  3. Pain rating at Worst: 2/10 in order to progress towards increased independence with activity.    Long Term Goals: 8 weeks   1. Patient will improve glute med strength to 4/5 in order to go up and down stairs.  2. Patient will improve psoas strength to 4+/5 in order to walk long distances for exercise.  3. Patient will demonstrate normal gait mechanics in order to minimize any compensation and return to PLOF.   4. Patient will self report improvement to 40% limitation on the FOTO Hip Survey.     PLAN   Plan of care Certification: 10/11/2022 to 12/11/2022.    Outpatient Physical Therapy 2 times weekly for 8 weeks to include the following interventions: Gait Training, Manual Therapy, Moist Heat/ Ice, Neuromuscular Re-ed, Patient Education, Self Care, Therapeutic Activities, Therapeutic Exercise, and Functional Dry Needling .     Isaiah Cerrato, PT, DPT    I CERTIFY THE NEED FOR THESE SERVICES FURNISHED UNDER THIS PLAN OF TREATMENT AND WHILE UNDER MY CARE   Physician's comments:     Physician's Signature: ___________________________________________________

## 2022-10-11 NOTE — TELEPHONE ENCOUNTER
----- Message from Grace Dave sent at 10/11/2022  9:23 AM CDT -----  Contact: Self/[phone]  Pt is calling in regards to medication, she states her pharmacy reached out to the office last week for refill on medication (leflunomide (ARAVA) 20 MG Tab). Please give her a call back at [phone] or send prescription refill to     [facility] [id number]  [facility] [address]  Phone: [phone] Fax: [phone]    Thank you s/g

## 2022-10-18 ENCOUNTER — CLINICAL SUPPORT (OUTPATIENT)
Dept: REHABILITATION | Facility: HOSPITAL | Age: 67
End: 2022-10-18
Payer: MEDICARE

## 2022-10-18 DIAGNOSIS — M25.551 BILATERAL HIP PAIN: Primary | ICD-10-CM

## 2022-10-18 DIAGNOSIS — M25.552 BILATERAL HIP PAIN: Primary | ICD-10-CM

## 2022-10-18 PROCEDURE — 97140 MANUAL THERAPY 1/> REGIONS: CPT | Mod: CQ

## 2022-10-18 PROCEDURE — 97110 THERAPEUTIC EXERCISES: CPT | Mod: CQ

## 2022-10-18 NOTE — PROGRESS NOTES
[facility]   Physical Therapy Treatment Note     Name: Maricarmen Becerril  Clinic Number: [id number]    Therapy Diagnosis:   Encounter Diagnosis   Name Primary?    Bilateral hip pain Yes     Physician: Donis Monroy,*    Visit Date: 10/18/2022    Physician Orders: PT Eval and Treat   Medical Diagnosis from Referral: M70.61,M70.62 (ICD-10-CM) - Trochanteric bursitis of both hips  Evaluation Date: 10/11/2022  Authorization Period Expiration: 8/17/2023  Plan of Care Expiration: 12/11/2022  Progress Note Due: 11/11/2022  Visit # / Visits authorized: 1/20 (+ evaluation)  FOTO: 1/3      PTA Visit #: 1/5     Precautions: Standard    Time In: 0815  Time Out: 0905  Total Billable Time: 45 minutes     SUBJECTIVE     Patient reports: that her pain is only on her left side. Tried home exercise program and did well with all exercises except bridges which caused increased left lower extremity and hip pain.     She was compliant with home exercise program.    Response to previous treatment: felt okay after evaluation    Functional change: decreased standing and sitting tolerance, decreased sleep in her side left worse than right, decreased carrying laundry    Pain: 3/10     Location: left peroneals     OBJECTIVE     Objective Measures updated at progress report only unless specified.       TREATMENT     Maricarmen received the treatments listed below:     MANUAL THERAPY TECHNIQUES were applied for (10) minutes, including:    Manual Intervention Performed Today    Soft Tissue Mobilization           Joint Mobilizations x Long leg distraction and lateral hip glides    []     []    Functional Dry Needling  []      Plan for Next Visit: Continue as needed         THERAPEUTIC EXERCISES to develop strength, endurance, ROM, flexibility, posture, and core stabilization for (33) minutes including:    Intervention Performed Today    Exercises bike x 5 minutes   Bridges  x X 10 with modifications for  comfort   clams x 3 x 10 bilateral    Piriformis stretch x 3 x 30 seconds bilateral                          Plan for Next Visit:          NEUROMUSCULAR RE-EDUCATION ACTIVITIES to improve Balance, Coordination, Kinesthetic, Sense, Proprioception, and Posture for (12) minutes.  The following were included:    Intervention Performed Today    March in place x 2 x 10 with bilateral hand placement on head of tall mat   Heel raises x 2 x 10 with bilateral hand placement on head of tall mat   Toe raises x 2 x 10 with bilateral hand placement on head of tall mat   Unilateal standing     Step ups     Side steps                 Plan for Next Visit:          PATIENT EDUCATION AND HOME EXERCISES     Home Exercises Provided and Patient Education Provided     Education provided: (during session ) minutes  PURPOSE: Patient educated on the impairments noted above and the effects of physical therapy intervention to improve overall condition and QOL.   EXERCISE: Patient was educated on all the above exercise prior/during/after for proper posture, positioning, and execution for safe performance with home exercise program. Educated on the benefit of exercises in weight bearing to assist with managing osteopenia.   STRENGTH: Patient educated on the importance of improved core and extremity strength in order to improve alignment of the spine and extremities with static positions and dynamic movement.     Written Home Exercises Provided: yes.  Exercises were reviewed and Maricarmen was able to demonstrate them prior to the end of the session.  Maricarmen demonstrated good  understanding of the education provided. See EMR under Patient Instructions for exercises provided during therapy sessions.    ASSESSMENT     Attempted to modify bridges exercise with limiting range of motion and repositioning lower extremities with only minimal decrease in left lower extremity/hip pain. Patient performed clams with better quality after instructions and  demonstrated good balance with standing exercises with bilateral hand support.     Maricarmen is progressing well towards her goals.   Pt prognosis is .     Pt will continue to benefit from skilled outpatient physical therapy to address the deficits listed in the problem list box on initial evaluation, provide pt/family education and to maximize pt's level of independence in the home and community environment.     Pt's spiritual, cultural and educational needs considered and pt agreeable to plan of care and goals.     Anticipated Barriers for therapy: co-morbidities     Goals:  Short Term Goals: 4 weeks   1. Recent signs and systems trend is improving in order to progress towards LTG's.  2. Patient will be independent with HEP in order to further progress and return to maximal function.  3. Pain rating at Worst: 2/10 in order to progress towards increased independence with activity.     Long Term Goals: 8 weeks   1. Patient will improve glute med strength to 4/5 in order to go up and down stairs.  2. Patient will improve psoas strength to 4+/5 in order to walk long distances for exercise.  3. Patient will demonstrate normal gait mechanics in order to minimize any compensation and return to PLOF.   4. Patient will self report improvement to 40% limitation on the FOTO Hip Survey.      PLAN     Continue Plan of Care (POC) and progress per patient tolerance. See treatment section for details on planned progressions next session.      Aristeo Hill, PTA

## 2022-10-21 ENCOUNTER — DOCUMENTATION ONLY (OUTPATIENT)
Dept: REHABILITATION | Facility: HOSPITAL | Age: 67
End: 2022-10-21
Payer: MEDICARE

## 2022-10-21 ENCOUNTER — CLINICAL SUPPORT (OUTPATIENT)
Dept: REHABILITATION | Facility: HOSPITAL | Age: 67
End: 2022-10-21
Payer: MEDICARE

## 2022-10-21 DIAGNOSIS — M25.552 BILATERAL HIP PAIN: Primary | ICD-10-CM

## 2022-10-21 DIAGNOSIS — M25.551 BILATERAL HIP PAIN: Primary | ICD-10-CM

## 2022-10-21 PROCEDURE — 97110 THERAPEUTIC EXERCISES: CPT

## 2022-10-21 PROCEDURE — 97112 NEUROMUSCULAR REEDUCATION: CPT

## 2022-10-21 PROCEDURE — 97140 MANUAL THERAPY 1/> REGIONS: CPT

## 2022-10-21 NOTE — PROGRESS NOTES
PT/PTA met face to face to discuss pt's treatment plan and progress towards established goals. Pt will be seen by a physical therapist minimally every 6th visit or every 30 days.        Aristeo Hill PTA

## 2022-10-21 NOTE — PROGRESS NOTES
[facility]   Physical Therapy Treatment Note     Name: Maricarmen [facility]  Clinic Number: [id number]    Therapy Diagnosis:   Encounter Diagnosis   Name Primary?    Bilateral hip pain Yes     Physician: Donis Monroy,*    Visit Date: 10/21/2022    Physician Orders: PT Eval and Treat   Medical Diagnosis from Referral: M70.61,M70.62 (ICD-10-CM) - Trochanteric bursitis of both hips  Evaluation Date: 10/11/2022  Authorization Period Expiration: 12/31/2022  Plan of Care Expiration: 12/11/2022  Progress Note Due: 11/11/2022  Visit # / Visits authorized: 2/20 (+ evaluation)  FOTO: 1/3      PTA Visit #: 1/5     Precautions: Standard    Time In: 10:30am  Time Out: 11:20am  Total Billable Time: 46 minutes     SUBJECTIVE     Patient reports: Her hips are bothering her a little today. She states she was feeling good after last session but admits she hasn't done much since.   She was compliant with home exercise program.  Response to previous treatment: felt okay after evaluation  Functional change: decreased standing and sitting tolerance, decreased sleep in her side left worse than right, decreased carrying laundry    Pain: 3/10     Location: left peroneals     OBJECTIVE     Objective Measures updated at progress report only unless specified.       TREATMENT     Maricarmen received the treatments listed below:     MANUAL THERAPY TECHNIQUES were applied for (10) minutes, including:    Manual Intervention Performed Today    Soft Tissue Mobilization x Theraroller to lateral hip       Joint Mobilizations x Long leg distraction and lateral hip glides    []     []    Functional Dry Needling  []      Plan for Next Visit: Continue as needed         THERAPEUTIC EXERCISES to develop strength, endurance, ROM, flexibility, posture, and core stabilization for (18) minutes including:    Intervention Performed Today    Exercises bike x 5 minutes   Bridges  x 3 x 10 with modifications for comfort   Clams  x 3 x 10 bilateral    Piriformis stretch x 3 x 30 seconds bilateral    Single Leg Shuttle Squat x 3 x 10                     Plan for Next Visit:        NEUROMUSCULAR RE-EDUCATION ACTIVITIES to improve Balance, Coordination, Kinesthetic, Sense, Proprioception, and Posture for (18) minutes.  The following were included:    Intervention Performed Today    March in place  2 x 10 with bilateral hand placement on head of tall mat   Heel raises x 2 x 10 with bilateral hand placement on head of tall mat   Toe raises x 2 x 10 with bilateral hand placement on head of tall mat   Single Leg Balance x 3 x 30 secs   Step ups x 3 x10   Side steps x 3 x 10   Sit to Stand x 3 x 5           Plan for Next Visit:        PATIENT EDUCATION AND HOME EXERCISES     Home Exercises Provided and Patient Education Provided     Education provided: (during session ) minutes  PURPOSE: Patient educated on the impairments noted above and the effects of physical therapy intervention to improve overall condition and QOL.   EXERCISE: Patient was educated on all the above exercise prior/during/after for proper posture, positioning, and execution for safe performance with home exercise program. Educated on the benefit of exercises in weight bearing to assist with managing osteopenia.   STRENGTH: Patient educated on the importance of improved core and extremity strength in order to improve alignment of the spine and extremities with static positions and dynamic movement.     Written Home Exercises Provided: yes.  Exercises were reviewed and Maricarmen was able to demonstrate them prior to the end of the session.  Maricarmen demonstrated good  understanding of the education provided. See EMR under Patient Instructions for exercises provided during therapy sessions.    ASSESSMENT     More movements were added today to progress patient more functionally. Sit to stands ere worked on to have patient make more of an effort to decreases knee valgus and shift her weight  more posteriorly. At the end of the session the theraroller was used for soft tissue to the lateral hip to help decrease tension. Overall, Ms Maricarmen tolerated today's session very well.     Maricarmen is progressing well towards her goals.   Pt prognosis is .     Pt will continue to benefit from skilled outpatient physical therapy to address the deficits listed in the problem list box on initial evaluation, provide pt/family education and to maximize pt's level of independence in the home and community environment.     Pt's spiritual, cultural and educational needs considered and pt agreeable to plan of care and goals.     Anticipated Barriers for therapy: co-morbidities     Goals:  Short Term Goals: 4 weeks   1. Recent signs and systems trend is improving in order to progress towards LTG's.  2. Patient will be independent with HEP in order to further progress and return to maximal function.  3. Pain rating at Worst: 2/10 in order to progress towards increased independence with activity.     Long Term Goals: 8 weeks   1. Patient will improve glute med strength to 4/5 in order to go up and down stairs.  2. Patient will improve psoas strength to 4+/5 in order to walk long distances for exercise.  3. Patient will demonstrate normal gait mechanics in order to minimize any compensation and return to PLOF.   4. Patient will self report improvement to 40% limitation on the FOTO Hip Survey.      PLAN     Continue Plan of Care (POC) and progress per patient tolerance. See treatment section for details on planned progressions next session.      Isaiah Cerrato, PT , DPT

## 2022-10-25 ENCOUNTER — CLINICAL SUPPORT (OUTPATIENT)
Dept: REHABILITATION | Facility: HOSPITAL | Age: 67
End: 2022-10-25
Payer: MEDICARE

## 2022-10-25 DIAGNOSIS — M25.552 BILATERAL HIP PAIN: Primary | ICD-10-CM

## 2022-10-25 DIAGNOSIS — M25.551 BILATERAL HIP PAIN: Primary | ICD-10-CM

## 2022-10-25 PROCEDURE — 97140 MANUAL THERAPY 1/> REGIONS: CPT

## 2022-10-25 PROCEDURE — 97112 NEUROMUSCULAR REEDUCATION: CPT

## 2022-10-25 PROCEDURE — 97110 THERAPEUTIC EXERCISES: CPT

## 2022-10-25 NOTE — PROGRESS NOTES
[facility]   Physical Therapy Treatment Note     Name: Maricarmen Becerril  Clinic Number: [id number]    Therapy Diagnosis:   Encounter Diagnosis   Name Primary?    Bilateral hip pain Yes     Physician: Donis Monroy,*    Visit Date: 10/25/2022    Physician Orders: PT Eval and Treat   Medical Diagnosis from Referral: M70.61,M70.62 (ICD-10-CM) - Trochanteric bursitis of both hips  Evaluation Date: 10/11/2022  Authorization Period Expiration: 12/31/2022  Plan of Care Expiration: 12/11/2022  Progress Note Due: 11/11/2022  Visit # / Visits authorized: 3/20 (+ evaluation)  FOTO: 1/3      PTA Visit #: 1/5     Precautions: Standard    Time In: 2:20pm  Time Out: 3:15pm  Total Billable Time: 53 minutes     SUBJECTIVE     Patient reports: She feels alright, hips are bothering her some.   She was compliant with home exercise program.  Response to previous treatment: felt okay after evaluation  Functional change: decreased standing and sitting tolerance, decreased sleep in her side left worse than right, decreased carrying laundry    Pain: 3/10     Location: left peroneals     OBJECTIVE     Objective Measures updated at progress report only unless specified.     TREATMENT     Maricarmen received the treatments listed below:     MANUAL THERAPY TECHNIQUES were applied for (10) minutes, including:    Manual Intervention Performed Today    Soft Tissue Mobilization x Theraroller to lateral hip       Joint Mobilizations x Long leg distraction and lateral hip glides    []     []    Functional Dry Needling  []      Plan for Next Visit: Continue as needed         THERAPEUTIC EXERCISES to develop strength, endurance, ROM, flexibility, posture, and core stabilization for (18) minutes including:    Intervention Performed Today    Exercises bike x 5 minutes   Bridges  x 3 x 10 with modifications for comfort   Clams x 3 x 10 bilateral with ball at feet    Piriformis stretch x 3 x 30 seconds bilateral     Single Leg Shuttle Squat x 3 x 10                     Plan for Next Visit:        NEUROMUSCULAR RE-EDUCATION ACTIVITIES to improve Balance, Coordination, Kinesthetic, Sense, Proprioception, and Posture for (25) minutes.  The following were included:    Intervention Performed Today    March in place  2 x 10 with bilateral hand placement on head of tall mat   Heel raises x 2 x 10 with bilateral hand placement on head of tall mat   Toe raises x 2 x 10 with bilateral hand placement on head of tall mat   Single Leg Balance x 3 x 30 secs   Step ups x 2 x 10   Side steps x 3 x 10   Sit to Stand x 3 x 5    Standing Hip Extension  x 3 x 10     Plan for Next Visit:        PATIENT EDUCATION AND HOME EXERCISES     Home Exercises Provided and Patient Education Provided     Education provided: (during session ) minutes  PURPOSE: Patient educated on the impairments noted above and the effects of physical therapy intervention to improve overall condition and QOL.   EXERCISE: Patient was educated on all the above exercise prior/during/after for proper posture, positioning, and execution for safe performance with home exercise program. Educated on the benefit of exercises in weight bearing to assist with managing osteopenia.   STRENGTH: Patient educated on the importance of improved core and extremity strength in order to improve alignment of the spine and extremities with static positions and dynamic movement.     Written Home Exercises Provided: yes.  Exercises were reviewed and Maricarmen was able to demonstrate them prior to the end of the session.  Maricarmen demonstrated good  understanding of the education provided. See EMR under Patient Instructions for exercises provided during therapy sessions.    ASSESSMENT     Prone hip extensions were attempted today but deferred due to pain and decreased activation. It was performed in standing and patient was able to get more motion. Overall, Ms Hernadez tolerated today's session very well.  She was advised to keep working on all exercises and stretches at home daily in order to keep strengthening and working on muscle control. Her muscle control is improving her but her strength and endurance still require more work.     Maricarmen is progressing well towards her goals.   Pt prognosis is .     Pt will continue to benefit from skilled outpatient physical therapy to address the deficits listed in the problem list box on initial evaluation, provide pt/family education and to maximize pt's level of independence in the home and community environment.     Pt's spiritual, cultural and educational needs considered and pt agreeable to plan of care and goals.     Anticipated Barriers for therapy: co-morbidities     Goals:  Short Term Goals: 4 weeks   1. Recent signs and systems trend is improving in order to progress towards LTG's.  2. Patient will be independent with HEP in order to further progress and return to maximal function.  3. Pain rating at Worst: 2/10 in order to progress towards increased independence with activity.     Long Term Goals: 8 weeks   1. Patient will improve glute med strength to 4/5 in order to go up and down stairs.  2. Patient will improve psoas strength to 4+/5 in order to walk long distances for exercise.  3. Patient will demonstrate normal gait mechanics in order to minimize any compensation and return to PLOF.   4. Patient will self report improvement to 40% limitation on the FOTO Hip Survey.      PLAN     Continue Plan of Care (POC) and progress per patient tolerance. See treatment section for details on planned progressions next session.      Isaiah Cerrato, PT , DPT

## 2022-10-28 ENCOUNTER — CLINICAL SUPPORT (OUTPATIENT)
Dept: REHABILITATION | Facility: HOSPITAL | Age: 67
End: 2022-10-28
Payer: COMMERCIAL

## 2022-10-28 DIAGNOSIS — M25.551 BILATERAL HIP PAIN: Primary | ICD-10-CM

## 2022-10-28 DIAGNOSIS — M25.552 BILATERAL HIP PAIN: Primary | ICD-10-CM

## 2022-10-28 PROCEDURE — 97112 NEUROMUSCULAR REEDUCATION: CPT

## 2022-10-28 PROCEDURE — 97140 MANUAL THERAPY 1/> REGIONS: CPT

## 2022-10-28 PROCEDURE — 97110 THERAPEUTIC EXERCISES: CPT

## 2022-10-28 NOTE — PROGRESS NOTES
[facility]   Physical Therapy Treatment Note     Name: Maricarmen Recinosberger  Clinic Number: [id number]    Therapy Diagnosis:   Encounter Diagnosis   Name Primary?    Bilateral hip pain Yes     Physician: Donis Monroy,*    Visit Date: 10/28/2022    Physician Orders: PT Eval and Treat   Medical Diagnosis from Referral: M70.61,M70.62 (ICD-10-CM) - Trochanteric bursitis of both hips  Evaluation Date: 10/11/2022  Authorization Period Expiration: 12/31/2022  Plan of Care Expiration: 12/11/2022  Progress Note Due: 11/11/2022  Visit # / Visits authorized: 4/20 (+ evaluation)  FOTO: 1/3      PTA Visit #: 1/5     Precautions: Standard    Time In: 10:30am  Time Out: 11:25am  Total Billable Time: 50 minutes     SUBJECTIVE     Patient reports: She is feeling alright today.   She was compliant with home exercise program.  Response to previous treatment: felt okay after evaluation  Functional change: decreased standing and sitting tolerance, decreased sleep in her side left worse than right, decreased carrying laundry    Pain: 3/10     Location: left peroneals     OBJECTIVE     Objective Measures updated at progress report only unless specified.     TREATMENT     Maricarmen received the treatments listed below:     MANUAL THERAPY TECHNIQUES were applied for (10) minutes, including:    Manual Intervention Performed Today    Soft Tissue Mobilization x Theraroller to lateral hip       Joint Mobilizations x Long leg distraction and lateral hip glides    []     []    Functional Dry Needling  []      Plan for Next Visit: Continue as needed     THERAPEUTIC EXERCISES to develop strength, endurance, ROM, flexibility, posture, and core stabilization for (15) minutes including:    Intervention Performed Today    Exercises bike x 5 minutes   Bridges  x 3 x 10 with modifications for comfort   Clams x 3 x 10 bilateral with ball at feet    Piriformis stretch x 3 x 30 seconds bilateral    Single Leg Shuttle  Squat x 3 x 10                     Plan for Next Visit:        NEUROMUSCULAR RE-EDUCATION ACTIVITIES to improve Balance, Coordination, Kinesthetic, Sense, Proprioception, and Posture for (25) minutes.  The following were included:    Intervention Performed Today    March in place  2 x 10 with bilateral hand placement on head of tall mat   Heel raises x 3 x 10 with bilateral hand placement on head of tall mat   Toe raises x 3 x 10 with bilateral hand placement on head of tall mat   Single Leg Balance x 3 x 30 secs   Step ups x 2 x 10   Side steps x 3 x 10   Sit to Stand x 3 x 10   Standing Hip Extension  x 3 x 10     Plan for Next Visit:        PATIENT EDUCATION AND HOME EXERCISES     Home Exercises Provided and Patient Education Provided     Education provided: (during session ) minutes  PURPOSE: Patient educated on the impairments noted above and the effects of physical therapy intervention to improve overall condition and QOL.   EXERCISE: Patient was educated on all the above exercise prior/during/after for proper posture, positioning, and execution for safe performance with home exercise program. Educated on the benefit of exercises in weight bearing to assist with managing osteopenia.   STRENGTH: Patient educated on the importance of improved core and extremity strength in order to improve alignment of the spine and extremities with static positions and dynamic movement.     Written Home Exercises Provided: yes.  Exercises were reviewed and Maricarmen was able to demonstrate them prior to the end of the session.  Maricarmen demonstrated good  understanding of the education provided. See EMR under Patient Instructions for exercises provided during therapy sessions.    ASSESSMENT     Patient was progressed with weight on shuttle squats today and with repetitions on sit to stands. Overall, she tolerated today's session well. Her endurance is improving along with her balance. She still struggle with glute activation and  control.     Maricarmen is progressing well towards her goals.   Pt prognosis is .     Pt will continue to benefit from skilled outpatient physical therapy to address the deficits listed in the problem list box on initial evaluation, provide pt/family education and to maximize pt's level of independence in the home and community environment.     Pt's spiritual, cultural and educational needs considered and pt agreeable to plan of care and goals.     Anticipated Barriers for therapy: co-morbidities     Goals:  Short Term Goals: 4 weeks   1. Recent signs and systems trend is improving in order to progress towards LTG's.  2. Patient will be independent with HEP in order to further progress and return to maximal function.  3. Pain rating at Worst: 2/10 in order to progress towards increased independence with activity.     Long Term Goals: 8 weeks   1. Patient will improve glute med strength to 4/5 in order to go up and down stairs.  2. Patient will improve psoas strength to 4+/5 in order to walk long distances for exercise.  3. Patient will demonstrate normal gait mechanics in order to minimize any compensation and return to PLOF.   4. Patient will self report improvement to 40% limitation on the FOTO Hip Survey.      PLAN     Continue Plan of Care (POC) and progress per patient tolerance. See treatment section for details on planned progressions next session.      Isaiah Cerrato, PT , DPT

## 2022-11-01 ENCOUNTER — CLINICAL SUPPORT (OUTPATIENT)
Dept: REHABILITATION | Facility: HOSPITAL | Age: 67
End: 2022-11-01
Payer: MEDICARE

## 2022-11-01 DIAGNOSIS — M25.551 BILATERAL HIP PAIN: Primary | ICD-10-CM

## 2022-11-01 DIAGNOSIS — M25.552 BILATERAL HIP PAIN: Primary | ICD-10-CM

## 2022-11-01 PROCEDURE — 97112 NEUROMUSCULAR REEDUCATION: CPT

## 2022-11-01 PROCEDURE — 97110 THERAPEUTIC EXERCISES: CPT

## 2022-11-01 PROCEDURE — 97140 MANUAL THERAPY 1/> REGIONS: CPT

## 2022-11-04 ENCOUNTER — CLINICAL SUPPORT (OUTPATIENT)
Dept: REHABILITATION | Facility: HOSPITAL | Age: 67
End: 2022-11-04
Payer: COMMERCIAL

## 2022-11-04 DIAGNOSIS — M25.551 BILATERAL HIP PAIN: Primary | ICD-10-CM

## 2022-11-04 DIAGNOSIS — M25.552 BILATERAL HIP PAIN: Primary | ICD-10-CM

## 2022-11-04 PROCEDURE — 97140 MANUAL THERAPY 1/> REGIONS: CPT

## 2022-11-04 PROCEDURE — 97110 THERAPEUTIC EXERCISES: CPT

## 2022-11-04 PROCEDURE — 97112 NEUROMUSCULAR REEDUCATION: CPT

## 2022-11-04 NOTE — PROGRESS NOTES
[facility]   Physical Therapy Treatment Note     Name: Maricarmen Becerril  Clinic Number: [id number]    Therapy Diagnosis:   Encounter Diagnosis   Name Primary?    Bilateral hip pain Yes     Physician: Donis Monroy,*    Visit Date: 11/4/2022    Physician Orders: PT Eval and Treat   Medical Diagnosis from Referral: M70.61,M70.62 (ICD-10-CM) - Trochanteric bursitis of both hips  Evaluation Date: 10/11/2022  Authorization Period Expiration: 12/31/2022  Plan of Care Expiration: 12/11/2022  Progress Note Due: 11/11/2022  Visit # / Visits authorized: 6/20 (+ evaluation)  FOTO: 1/3      PTA Visit #: 1/5     Precautions: Standard    Time In: 11:30am  Time Out: 12:30pm  Total Billable Time: 50 minutes     SUBJECTIVE     Patient reports: She feels good today.   She was compliant with home exercise program.  Response to previous treatment: felt okay after evaluation  Functional change: decreased standing and sitting tolerance, decreased sleep in her side left worse than right, decreased carrying laundry    Pain: 3/10     Location: left peroneals     OBJECTIVE     Objective Measures updated at progress report only unless specified.     TREATMENT     Maricarmen received the treatments listed below:     MANUAL THERAPY TECHNIQUES were applied for (10) minutes, including:    Manual Intervention Performed Today    Soft Tissue Mobilization x Theraroller to lateral hip       Joint Mobilizations x Long leg distraction and lateral hip glides    []     []    Functional Dry Needling  []      Plan for Next Visit: Continue as needed     THERAPEUTIC EXERCISES to develop strength, endurance, ROM, flexibility, posture, and core stabilization for (15) minutes including:    Intervention Performed Today    Exercises bike x 5 minutes   Bridges  x 3 x 10 with modifications for comfort   Clams x 3 x 10 bilateral with ball at feet    Piriformis stretch x 3 x 30 seconds bilateral    Single Leg Shuttle Squat x 3  x 10    Supine Hip Abduction x 3 x 10 red band                Plan for Next Visit:        NEUROMUSCULAR RE-EDUCATION ACTIVITIES to improve Balance, Coordination, Kinesthetic, Sense, Proprioception, and Posture for (25) minutes.  The following were included:    Intervention Performed Today    March in place  2 x 10 with bilateral hand placement on head of tall mat   Heel raises x 3 x 10 with bilateral hand placement on head of tall mat   Toe raises x 3 x 10 with bilateral hand placement on head of tall mat   Single Leg Balance x 3 x 30 secs   Step ups x 2 x 10   Side steps x 3 x 10   Sit to Stand x 3 x 10   Standing Hip Extension  x 3 x 10     Plan for Next Visit:        PATIENT EDUCATION AND HOME EXERCISES     Home Exercises Provided and Patient Education Provided     Education provided: (during session ) minutes  PURPOSE: Patient educated on the impairments noted above and the effects of physical therapy intervention to improve overall condition and QOL.   EXERCISE: Patient was educated on all the above exercise prior/during/after for proper posture, positioning, and execution for safe performance with home exercise program. Educated on the benefit of exercises in weight bearing to assist with managing osteopenia.   STRENGTH: Patient educated on the importance of improved core and extremity strength in order to improve alignment of the spine and extremities with static positions and dynamic movement.     Written Home Exercises Provided: yes.  Exercises were reviewed and Maricarmen was able to demonstrate them prior to the end of the session.  Maricarmen demonstrated good  understanding of the education provided. See EMR under Patient Instructions for exercises provided during therapy sessions.    ASSESSMENT     Patient had the most difficulty and pain with hip extension movements. She did report more discomfort with standing activity today but was able to still perform all. She was advised to keep working on strengthening  at home each day.     Maricarmen is progressing well towards her goals.   Pt prognosis is .     Pt will continue to benefit from skilled outpatient physical therapy to address the deficits listed in the problem list box on initial evaluation, provide pt/family education and to maximize pt's level of independence in the home and community environment.     Pt's spiritual, cultural and educational needs considered and pt agreeable to plan of care and goals.     Anticipated Barriers for therapy: co-morbidities     Goals:  Short Term Goals: 4 weeks   1. Recent signs and systems trend is improving in order to progress towards LTG's.  2. Patient will be independent with HEP in order to further progress and return to maximal function.  3. Pain rating at Worst: 2/10 in order to progress towards increased independence with activity.     Long Term Goals: 8 weeks   1. Patient will improve glute med strength to 4/5 in order to go up and down stairs.  2. Patient will improve psoas strength to 4+/5 in order to walk long distances for exercise.  3. Patient will demonstrate normal gait mechanics in order to minimize any compensation and return to PLOF.   4. Patient will self report improvement to 40% limitation on the FOTO Hip Survey.      PLAN     Continue Plan of Care (POC) and progress per patient tolerance. See treatment section for details on planned progressions next session.      Isaiah Cerrato, PT , DPT

## 2022-11-09 ENCOUNTER — CLINICAL SUPPORT (OUTPATIENT)
Dept: REHABILITATION | Facility: HOSPITAL | Age: 67
End: 2022-11-09
Payer: COMMERCIAL

## 2022-11-09 DIAGNOSIS — M25.552 BILATERAL HIP PAIN: Primary | ICD-10-CM

## 2022-11-09 DIAGNOSIS — M25.551 BILATERAL HIP PAIN: Primary | ICD-10-CM

## 2022-11-09 PROCEDURE — 97140 MANUAL THERAPY 1/> REGIONS: CPT

## 2022-11-09 PROCEDURE — 97110 THERAPEUTIC EXERCISES: CPT

## 2022-11-09 PROCEDURE — 97112 NEUROMUSCULAR REEDUCATION: CPT

## 2022-11-09 NOTE — PROGRESS NOTES
[facility]   Physical Therapy Treatment Note     Name: Maricarmen Christie [facility]  Clinic Number: [id number]    Therapy Diagnosis:   Encounter Diagnosis   Name Primary?    Bilateral hip pain Yes     Physician: Donis Monroy,*    Visit Date: 11/9/2022    Physician Orders: PT Eval and Treat   Medical Diagnosis from Referral: M70.61,M70.62 (ICD-10-CM) - Trochanteric bursitis of both hips  Evaluation Date: 10/11/2022  Authorization Period Expiration: 12/31/2022  Plan of Care Expiration: 12/11/2022  Progress Note Due: 11/11/2022  Visit # / Visits authorized: 7/20 (+ evaluation)  FOTO: 1/3      PTA Visit #: 1/5     Precautions: Standard    Time In: 12:55pm  Time Out: 1:45pm  Total Billable Time: 50 minutes     SUBJECTIVE     Patient reports: Both hips are bothering her. She states she isn't sure if the increased activity bothers her or if it is just more sore.   She was compliant with home exercise program.  Response to previous treatment: felt okay after evaluation  Functional change: decreased standing and sitting tolerance, decreased sleep in her side left worse than right, decreased carrying laundry    Pain: 3/10     Location: left peroneals     OBJECTIVE     Objective Measures updated at progress report only unless specified.     TREATMENT     Maricarmen received the treatments listed below:     MANUAL THERAPY TECHNIQUES were applied for (10) minutes, including:    Manual Intervention Performed Today    Soft Tissue Mobilization x Theraroller to lateral hip       Joint Mobilizations x Long leg distraction and lateral hip glides    []     []    Functional Dry Needling  []      Plan for Next Visit: Continue as needed     THERAPEUTIC EXERCISES to develop strength, endurance, ROM, flexibility, posture, and core stabilization for (15) minutes including:    Intervention Performed Today    Exercises bike x 5 minutes   Bridges  x 3 x 10 with modifications for comfort   Clams x 3 x 10 bilateral  with ball at feet    Piriformis stretch x 3 x 30 seconds bilateral    Single Leg Shuttle Squat x 3 x 10    Supine Hip Abduction x 3 x 10 red band                Plan for Next Visit:        NEUROMUSCULAR RE-EDUCATION ACTIVITIES to improve Balance, Coordination, Kinesthetic, Sense, Proprioception, and Posture for (25) minutes.  The following were included:    Intervention Performed Today    March in place  2 x 10 with bilateral hand placement on head of tall mat   Heel raises x 3 x 10 with bilateral hand placement on head of tall mat   Toe raises x 3 x 10 with bilateral hand placement on head of tall mat   Single Leg Balance x 3 x 30 secs   Step ups x 2 x 10   Side steps x 3 x 10   Sit to Stand x 3 x 10   Standing Hip Extension  x 3 x 10     Plan for Next Visit:        PATIENT EDUCATION AND HOME EXERCISES     Home Exercises Provided and Patient Education Provided     Education provided: (during session ) minutes  PURPOSE: Patient educated on the impairments noted above and the effects of physical therapy intervention to improve overall condition and QOL.   EXERCISE: Patient was educated on all the above exercise prior/during/after for proper posture, positioning, and execution for safe performance with home exercise program. Educated on the benefit of exercises in weight bearing to assist with managing osteopenia.   STRENGTH: Patient educated on the importance of improved core and extremity strength in order to improve alignment of the spine and extremities with static positions and dynamic movement.     Written Home Exercises Provided: yes.  Exercises were reviewed and Maricramen was able to demonstrate them prior to the end of the session.  Maricarmen demonstrated good  understanding of the education provided. See EMR under Patient Instructions for exercises provided during therapy sessions.    ASSESSMENT     Lateral step taps were added today to challenge lateral hip control more. Patient did have fatigue and some hip  discomfort today but overall tolerated session well.     Maricarmen is progressing well towards her goals.   Pt prognosis is .     Pt will continue to benefit from skilled outpatient physical therapy to address the deficits listed in the problem list box on initial evaluation, provide pt/family education and to maximize pt's level of independence in the home and community environment.     Pt's spiritual, cultural and educational needs considered and pt agreeable to plan of care and goals.     Anticipated Barriers for therapy: co-morbidities     Goals:  Short Term Goals: 4 weeks   1. Recent signs and systems trend is improving in order to progress towards LTG's.  2. Patient will be independent with HEP in order to further progress and return to maximal function.  3. Pain rating at Worst: 2/10 in order to progress towards increased independence with activity.     Long Term Goals: 8 weeks   1. Patient will improve glute med strength to 4/5 in order to go up and down stairs.  2. Patient will improve psoas strength to 4+/5 in order to walk long distances for exercise.  3. Patient will demonstrate normal gait mechanics in order to minimize any compensation and return to PLOF.   4. Patient will self report improvement to 40% limitation on the FOTO Hip Survey.      PLAN     Continue Plan of Care (POC) and progress per patient tolerance. See treatment section for details on planned progressions next session.      Isaiah Cerrato, PT , DPT

## 2022-11-18 ENCOUNTER — CLINICAL SUPPORT (OUTPATIENT)
Dept: REHABILITATION | Facility: HOSPITAL | Age: 67
End: 2022-11-18
Payer: MEDICARE

## 2022-11-18 ENCOUNTER — LAB VISIT (OUTPATIENT)
Dept: LAB | Facility: HOSPITAL | Age: 67
End: 2022-11-18
Attending: INTERNAL MEDICINE
Payer: COMMERCIAL

## 2022-11-18 DIAGNOSIS — M25.551 BILATERAL HIP PAIN: Primary | ICD-10-CM

## 2022-11-18 DIAGNOSIS — D84.9 IMMUNOCOMPROMISED: ICD-10-CM

## 2022-11-18 DIAGNOSIS — L40.50 PSORIATIC ARTHRITIS: ICD-10-CM

## 2022-11-18 DIAGNOSIS — M25.552 BILATERAL HIP PAIN: Primary | ICD-10-CM

## 2022-11-18 LAB
ALBUMIN SERPL BCP-MCNC: 4 G/DL (ref 3.5–5.2)
ALP SERPL-CCNC: 68 U/L (ref 55–135)
ALT SERPL W/O P-5'-P-CCNC: 27 U/L (ref 10–44)
ANION GAP SERPL CALC-SCNC: 8 MMOL/L (ref 8–16)
AST SERPL-CCNC: 24 U/L (ref 10–40)
BASOPHILS # BLD AUTO: 0.06 K/UL (ref 0–0.2)
BASOPHILS NFR BLD: 1.2 % (ref 0–1.9)
BILIRUB SERPL-MCNC: 0.5 MG/DL (ref 0.1–1)
BUN SERPL-MCNC: 17 MG/DL (ref 8–23)
CALCIUM SERPL-MCNC: 10.2 MG/DL (ref 8.7–10.5)
CHLORIDE SERPL-SCNC: 105 MMOL/L (ref 95–110)
CO2 SERPL-SCNC: 28 MMOL/L (ref 23–29)
CREAT SERPL-MCNC: 0.7 MG/DL (ref 0.5–1.4)
CRP SERPL-MCNC: 0.9 MG/L (ref 0–8.2)
DIFFERENTIAL METHOD: ABNORMAL
EOSINOPHIL # BLD AUTO: 0.2 K/UL (ref 0–0.5)
EOSINOPHIL NFR BLD: 4.7 % (ref 0–8)
ERYTHROCYTE [DISTWIDTH] IN BLOOD BY AUTOMATED COUNT: 13.9 % (ref 11.5–14.5)
ERYTHROCYTE [SEDIMENTATION RATE] IN BLOOD BY PHOTOMETRIC METHOD: 14 MM/HR (ref 0–36)
EST. GFR  (NO RACE VARIABLE): >60 ML/MIN/1.73 M^2
GLUCOSE SERPL-MCNC: 92 MG/DL (ref 70–110)
HCT VFR BLD AUTO: 43.1 % (ref 37–48.5)
HGB BLD-MCNC: 13.6 G/DL (ref 12–16)
IMM GRANULOCYTES # BLD AUTO: 0 K/UL (ref 0–0.04)
IMM GRANULOCYTES NFR BLD AUTO: 0 % (ref 0–0.5)
LYMPHOCYTES # BLD AUTO: 2.1 K/UL (ref 1–4.8)
LYMPHOCYTES NFR BLD: 41 % (ref 18–48)
MCH RBC QN AUTO: 29.4 PG (ref 27–31)
MCHC RBC AUTO-ENTMCNC: 31.6 G/DL (ref 32–36)
MCV RBC AUTO: 93 FL (ref 82–98)
MONOCYTES # BLD AUTO: 0.6 K/UL (ref 0.3–1)
MONOCYTES NFR BLD: 12.5 % (ref 4–15)
NEUTROPHILS # BLD AUTO: 2.1 K/UL (ref 1.8–7.7)
NEUTROPHILS NFR BLD: 40.6 % (ref 38–73)
NRBC BLD-RTO: 0 /100 WBC
PLATELET # BLD AUTO: 223 K/UL (ref 150–450)
PMV BLD AUTO: 9.9 FL (ref 9.2–12.9)
POTASSIUM SERPL-SCNC: 5.4 MMOL/L (ref 3.5–5.1)
PROT SERPL-MCNC: 7.8 G/DL (ref 6–8.4)
RBC # BLD AUTO: 4.63 M/UL (ref 4–5.4)
SODIUM SERPL-SCNC: 141 MMOL/L (ref 136–145)
WBC # BLD AUTO: 5.1 K/UL (ref 3.9–12.7)

## 2022-11-18 PROCEDURE — 36415 COLL VENOUS BLD VENIPUNCTURE: CPT | Performed by: INTERNAL MEDICINE

## 2022-11-18 PROCEDURE — 80053 COMPREHEN METABOLIC PANEL: CPT | Performed by: INTERNAL MEDICINE

## 2022-11-18 PROCEDURE — 86140 C-REACTIVE PROTEIN: CPT | Performed by: INTERNAL MEDICINE

## 2022-11-18 PROCEDURE — 85025 COMPLETE CBC W/AUTO DIFF WBC: CPT | Performed by: INTERNAL MEDICINE

## 2022-11-18 PROCEDURE — 85652 RBC SED RATE AUTOMATED: CPT | Performed by: INTERNAL MEDICINE

## 2022-11-18 PROCEDURE — 97110 THERAPEUTIC EXERCISES: CPT | Mod: PN

## 2022-11-18 PROCEDURE — 97112 NEUROMUSCULAR REEDUCATION: CPT | Mod: PN

## 2022-11-18 PROCEDURE — 97140 MANUAL THERAPY 1/> REGIONS: CPT | Mod: PN

## 2022-11-18 NOTE — PROGRESS NOTES
[facility] OUTPATIENT THERAPY AND WELLNESS   Physical Therapy Treatment Note     Name: Maricarmen Christie [facility]  Clinic Number: [id number]    Therapy Diagnosis:   Encounter Diagnosis   Name Primary?    Bilateral hip pain Yes     Physician: Donis Monroy,*    Visit Date: 11/18/2022    Physician Orders: PT Eval and Treat   Medical Diagnosis from Referral: M70.61,M70.62 (ICD-10-CM) - Trochanteric bursitis of both hips  Evaluation Date: 10/11/2022  Authorization Period Expiration: 12/31/2022  Plan of Care Expiration: 12/11/2022  Progress Note Due: 11/11/2022  Visit # / Visits authorized: 8/20 (+ evaluation)  FOTO: 1/3      PTA Visit #: 1/5     Precautions: Standard    Time In: 11:15am  Time Out: 12:00pm  Total Billable Time: 45 minutes     SUBJECTIVE     Patient reports: She feels like she is getting a little better. She states it doesn't bother her as often now.   She was compliant with home exercise program.  Response to previous treatment: felt okay after evaluation  Functional change: decreased standing and sitting tolerance, decreased sleep in her side left worse than right, decreased carrying laundry    Pain: 3/10     Location: left peroneals     OBJECTIVE     Objective Measures updated at progress report only unless specified.     TREATMENT     Maricarmen received the treatments listed below:     MANUAL THERAPY TECHNIQUES were applied for (10) minutes, including:  Thera Roller to lateral hip  Long [place] Hip Distraction on left    THERAPEUTIC EXERCISES to develop strength, endurance, ROM, flexibility, posture, and core stabilization for (15) minutes including:  Bike 5 minutes   Bridges 3x10  Sidelying Clams with ball at feet 3x10  Supine Hip Abduction 3x10 red band  Piriformis Stretch [id number]    NEUROMUSCULAR RE-EDUCATION ACTIVITIES to improve Balance, Coordination, Kinesthetic, Sense, Proprioception, and Posture for (20) minutes.  The following were included:  Heel Raises 3x10  Toe Raises 3x10  Single Leg  Balance [id number]  Step Ups Forward 2x10 6inch  Step Ups Side 10x each 6inch  Side Steps 3x10  Sit to Stand 3x10  Standing Hip Extension 3x10    PATIENT EDUCATION AND HOME EXERCISES     Home Exercises Provided and Patient Education Provided     Education provided: (during session ) minutes  PURPOSE: Patient educated on the impairments noted above and the effects of physical therapy intervention to improve overall condition and QOL.   EXERCISE: Patient was educated on all the above exercise prior/during/after for proper posture, positioning, and execution for safe performance with home exercise program. Educated on the benefit of exercises in weight bearing to assist with managing osteopenia.   STRENGTH: Patient educated on the importance of improved core and extremity strength in order to improve alignment of the spine and extremities with static positions and dynamic movement.     Written Home Exercises Provided: yes.  Exercises were reviewed and Maricarmen was able to demonstrate them prior to the end of the session.  Maricarmen demonstrated good  understanding of the education provided. See EMR under Patient Instructions for exercises provided during therapy sessions.    ASSESSMENT     Side step ups were added today to work on more lateral hip stability and endurance. Ms Hernadez was educated on working on more exercises at home in order to progress strengthening and endurance. Overall, Ms Hernadez tolerated today's session very well.     Maricarmen is progressing well towards her goals.   Pt prognosis is .     Pt will continue to benefit from skilled outpatient physical therapy to address the deficits listed in the problem list box on initial evaluation, provide pt/family education and to maximize pt's level of independence in the home and community environment.     Pt's spiritual, cultural and educational needs considered and pt agreeable to plan of care and goals.     Anticipated Barriers for therapy: co-morbidities      Goals:  Short Term Goals: 4 weeks   1. Recent signs and systems trend is improving in order to progress towards LTG's.  2. Patient will be independent with HEP in order to further progress and return to maximal function.  3. Pain rating at Worst: 2/10 in order to progress towards increased independence with activity.     Long Term Goals: 8 weeks   1. Patient will improve glute med strength to 4/5 in order to go up and down stairs.  2. Patient will improve psoas strength to 4+/5 in order to walk long distances for exercise.  3. Patient will demonstrate normal gait mechanics in order to minimize any compensation and return to PLOF.   4. Patient will self report improvement to 40% limitation on the FOTO Hip Survey.      PLAN     Continue Plan of Care (POC) and progress per patient tolerance. See treatment section for details on planned progressions next session.      Isaiah Cerrato, PT , DPT

## 2022-11-25 ENCOUNTER — CLINICAL SUPPORT (OUTPATIENT)
Dept: REHABILITATION | Facility: HOSPITAL | Age: 67
End: 2022-11-25
Payer: COMMERCIAL

## 2022-11-25 DIAGNOSIS — M25.551 BILATERAL HIP PAIN: Primary | ICD-10-CM

## 2022-11-25 DIAGNOSIS — M25.552 BILATERAL HIP PAIN: Primary | ICD-10-CM

## 2022-11-25 PROCEDURE — 97110 THERAPEUTIC EXERCISES: CPT | Mod: PN

## 2022-11-25 PROCEDURE — 97112 NEUROMUSCULAR REEDUCATION: CPT | Mod: PN

## 2022-11-25 PROCEDURE — 97140 MANUAL THERAPY 1/> REGIONS: CPT | Mod: PN

## 2022-11-25 NOTE — PROGRESS NOTES
[facility]   Physical Therapy Treatment Note     Name: Maricarmen Christie [facility]  Clinic Number: [id number]    Therapy Diagnosis:   Encounter Diagnosis   Name Primary?    Bilateral hip pain Yes     Physician: Donis Monroy,*    Visit Date: 11/25/2022    Physician Orders: PT Eval and Treat   Medical Diagnosis from Referral: M70.61,M70.62 (ICD-10-CM) - Trochanteric bursitis of both hips  Evaluation Date: 10/11/2022  Authorization Period Expiration: 12/31/2022  Plan of Care Expiration: 12/11/2022  Progress Note Due: 11/11/2022  Visit # / Visits authorized: 9/20 (+ evaluation)  FOTO: 1/3      PTA Visit #: 0/5     Precautions: Standard    Time In: 9:45am  Time Out: 10:30am  Total Billable Time: 45 minutes     SUBJECTIVE     Patient reports: Her hips were bothering her 2 days ago from climbing into the deer stand. She states it still bothers her a little today but not too bad.   She was compliant with home exercise program.  Response to previous treatment: felt okay after evaluation  Functional change: decreased standing and sitting tolerance, decreased sleep in her side left worse than right, decreased carrying laundry    Pain: 3/10     Location: left peroneals     OBJECTIVE     Objective Measures updated at progress report only unless specified.     TREATMENT     Maricarmen received the treatments listed below:     MANUAL THERAPY TECHNIQUES were applied for (8) minutes, including:  Thera Roller to lateral hip  Long [place] Hip Distraction on left    THERAPEUTIC EXERCISES to develop strength, endurance, ROM, flexibility, posture, and core stabilization for (13) minutes including:  Bike 5 minutes   Bridges 3x10  Sidelying Clams with ball at feet 3x10  Hip Internal Rotation with ball at knees 3x10  Supine Hip Abduction 3x10 red band  Piriformis Stretch [id number]    NEUROMUSCULAR RE-EDUCATION ACTIVITIES to improve Balance, Coordination, Kinesthetic, Sense, Proprioception, and Posture for (24)  minutes.  The following were included:  Heel Raises 3x10  Toe Raises 3x10  Single Leg Balance [id number]  Step Ups Forward 2x10 6inch  Step Ups Side 10x each 6inch  Side Steps 3x10  Sit to Stand 3x10  Standing Hip Extension 3x10    PATIENT EDUCATION AND HOME EXERCISES     Home Exercises Provided and Patient Education Provided     Education provided: (during session ) minutes  PURPOSE: Patient educated on the impairments noted above and the effects of physical therapy intervention to improve overall condition and QOL.   EXERCISE: Patient was educated on all the above exercise prior/during/after for proper posture, positioning, and execution for safe performance with home exercise program. Educated on the benefit of exercises in weight bearing to assist with managing osteopenia.   STRENGTH: Patient educated on the importance of improved core and extremity strength in order to improve alignment of the spine and extremities with static positions and dynamic movement.     Written Home Exercises Provided: yes.  Exercises were reviewed and Maricarmen was able to demonstrate them prior to the end of the session.  Maricarmen demonstrated good  understanding of the education provided. See EMR under Patient Instructions for exercises provided during therapy sessions.    ASSESSMENT     Patient was advised to work on exercises daily in order to progress muscle control and help with imbalances more. Hip internal rotation was added today to work on more hip strengthening and control. Overall, she tolerated today's session well.     Maricarmen is progressing well towards her goals.   Pt prognosis is .     Pt will continue to benefit from skilled outpatient physical therapy to address the deficits listed in the problem list box on initial evaluation, provide pt/family education and to maximize pt's level of independence in the home and community environment.     Pt's spiritual, cultural and educational needs considered and pt agreeable to plan of  care and goals.     Anticipated Barriers for therapy: co-morbidities     Goals:  Short Term Goals: 4 weeks   1. Recent signs and systems trend is improving in order to progress towards LTG's.  2. Patient will be independent with HEP in order to further progress and return to maximal function.  3. Pain rating at Worst: 2/10 in order to progress towards increased independence with activity.     Long Term Goals: 8 weeks   1. Patient will improve glute med strength to 4/5 in order to go up and down stairs.  2. Patient will improve psoas strength to 4+/5 in order to walk long distances for exercise.  3. Patient will demonstrate normal gait mechanics in order to minimize any compensation and return to PLOF.   4. Patient will self report improvement to 40% limitation on the FOTO Hip Survey.      PLAN     Continue Plan of Care (POC) and progress per patient tolerance. See treatment section for details on planned progressions next session.      Isaiah Cerrato, PT , DPT

## 2022-11-28 ENCOUNTER — CLINICAL SUPPORT (OUTPATIENT)
Dept: REHABILITATION | Facility: HOSPITAL | Age: 67
End: 2022-11-28
Payer: MEDICARE

## 2022-11-28 DIAGNOSIS — M25.551 BILATERAL HIP PAIN: Primary | ICD-10-CM

## 2022-11-28 DIAGNOSIS — M25.552 BILATERAL HIP PAIN: Primary | ICD-10-CM

## 2022-11-28 PROCEDURE — 97112 NEUROMUSCULAR REEDUCATION: CPT | Mod: PN

## 2022-11-28 PROCEDURE — 97110 THERAPEUTIC EXERCISES: CPT | Mod: PN

## 2022-11-28 PROCEDURE — 97140 MANUAL THERAPY 1/> REGIONS: CPT | Mod: PN

## 2022-11-28 NOTE — PROGRESS NOTES
[facility]   Physical Therapy Treatment Note     Name: Maricarmen Becerril  Clinic Number: [id number]    Therapy Diagnosis:   Encounter Diagnosis   Name Primary?    Bilateral hip pain Yes     Physician: Donis Monroy,*    Visit Date: 11/28/2022    Physician Orders: PT Eval and Treat   Medical Diagnosis from Referral: M70.61,M70.62 (ICD-10-CM) - Trochanteric bursitis of both hips  Evaluation Date: 10/11/2022  Authorization Period Expiration: 12/31/2022  Plan of Care Expiration: 12/11/2022  Progress Note Due: 11/11/2022  Visit # / Visits authorized: 10/20 (+ evaluation)  FOTO: 1/3      PTA Visit #: 0/5     Precautions: Standard    Time In: 1:00pm  Time Out: 1:50pm  Total Billable Time: 48 minutes     SUBJECTIVE     Patient reports: She was having discomfort this weekend. She reports she thinks it may just be arthritis because her shoulder was bothering her too. She states she hasn't been taking her medication like she was but will start again and see if that helps.   She was compliant with home exercise program.  Response to previous treatment: felt okay after evaluation  Functional change: decreased standing and sitting tolerance, decreased sleep in her side left worse than right, decreased carrying laundry    Pain: 3/10     Location: left peroneals     OBJECTIVE     Objective Measures updated at progress report only unless specified.     FOTO: 51% limitation    TREATMENT     Maricarmen received the treatments listed below:     MANUAL THERAPY TECHNIQUES were applied for (8) minutes, including:  Thera Roller to lateral hip  Long [place] Hip Distraction on left    THERAPEUTIC EXERCISES to develop strength, endurance, ROM, flexibility, posture, and core stabilization for (15) minutes including:  Bike 5 minutes   Bridges 3x10  Sidelying Clams with ball at feet 3x10  Hip Internal Rotation with ball at knees 3x10  Supine Hip Abduction 3x10 red band  Piriformis Stretch  [id number]    NEUROMUSCULAR RE-EDUCATION ACTIVITIES to improve Balance, Coordination, Kinesthetic, Sense, Proprioception, and Posture for (25) minutes.  The following were included:  Heel Raises 3x10  Toe Raises 3x10  Single Leg Balance [id number]  Step Ups Forward 2x10 6inch  Step Ups Side 10x each 6inch  Side Steps 3x10  Sit to Stand 3x10  Standing Hip Extension 3x10    PATIENT EDUCATION AND HOME EXERCISES     Home Exercises Provided and Patient Education Provided     Education provided: (during session ) minutes  PURPOSE: Patient educated on the impairments noted above and the effects of physical therapy intervention to improve overall condition and QOL.   EXERCISE: Patient was educated on all the above exercise prior/during/after for proper posture, positioning, and execution for safe performance with home exercise program. Educated on the benefit of exercises in weight bearing to assist with managing osteopenia.   STRENGTH: Patient educated on the importance of improved core and extremity strength in order to improve alignment of the spine and extremities with static positions and dynamic movement.     Written Home Exercises Provided: yes.  Exercises were reviewed and Maricarmen was able to demonstrate them prior to the end of the session.  Maricarmen demonstrated good  understanding of the education provided. See EMR under Patient Instructions for exercises provided during therapy sessions.    ASSESSMENT     Ms Hernadez tolerated her session very well. Her soreness did not limit ability to perform all movements but she did report fatigue. She was advised to monitor closely how she feels now that she is taking all of her medications again.     Maricarmen is progressing well towards her goals.   Pt prognosis is .     Pt will continue to benefit from skilled outpatient physical therapy to address the deficits listed in the problem list box on initial evaluation, provide pt/family education and to maximize pt's level of  independence in the home and community environment.     Pt's spiritual, cultural and educational needs considered and pt agreeable to plan of care and goals.     Anticipated Barriers for therapy: co-morbidities     Goals:  Short Term Goals: 4 weeks   1. Recent signs and systems trend is improving in order to progress towards LTG's.  2. Patient will be independent with HEP in order to further progress and return to maximal function.  3. Pain rating at Worst: 2/10 in order to progress towards increased independence with activity.     Long Term Goals: 8 weeks   1. Patient will improve glute med strength to 4/5 in order to go up and down stairs.  2. Patient will improve psoas strength to 4+/5 in order to walk long distances for exercise.  3. Patient will demonstrate normal gait mechanics in order to minimize any compensation and return to PLOF.   4. Patient will self report improvement to 40% limitation on the FOTO Hip Survey.      PLAN     Continue Plan of Care (POC) and progress per patient tolerance. See treatment section for details on planned progressions next session.      Isaiah Cerrato, PT , DPT

## 2022-12-02 ENCOUNTER — CLINICAL SUPPORT (OUTPATIENT)
Dept: REHABILITATION | Facility: HOSPITAL | Age: 67
End: 2022-12-02
Payer: COMMERCIAL

## 2022-12-02 DIAGNOSIS — M25.552 BILATERAL HIP PAIN: Primary | ICD-10-CM

## 2022-12-02 DIAGNOSIS — M25.551 BILATERAL HIP PAIN: Primary | ICD-10-CM

## 2022-12-02 PROCEDURE — 97110 THERAPEUTIC EXERCISES: CPT | Mod: PN

## 2022-12-02 PROCEDURE — 97140 MANUAL THERAPY 1/> REGIONS: CPT | Mod: PN

## 2022-12-02 PROCEDURE — 97112 NEUROMUSCULAR REEDUCATION: CPT | Mod: PN

## 2022-12-02 NOTE — PROGRESS NOTES
[facility]   Physical Therapy Treatment Note     Name: Maricarmen Christie [facility]  Clinic Number: [id number]    Therapy Diagnosis:   Encounter Diagnosis   Name Primary?    Bilateral hip pain Yes     Physician: Donis Monroy,*    Visit Date: 12/2/2022    Physician Orders: PT Eval and Treat   Medical Diagnosis from Referral: M70.61,M70.62 (ICD-10-CM) - Trochanteric bursitis of both hips  Evaluation Date: 10/11/2022  Authorization Period Expiration: 12/31/2022  Plan of Care Expiration: 12/11/2022  Progress Note Due: 11/11/2022  Visit # / Visits authorized: 11/20 (+ evaluation)  FOTO: 1/3      PTA Visit #: 0/5     Precautions: Standard    Time In: 11:15am  Time Out: 12:00pm  Total Billable Time: 45 minutes     SUBJECTIVE     Patient reports: Her hips have been feeling good yesterday and today.   She was compliant with home exercise program.  Response to previous treatment: felt okay after evaluation  Functional change: decreased standing and sitting tolerance, decreased sleep in her side left worse than right, decreased carrying laundry    Pain: 3/10     Location: bilateral hips    OBJECTIVE     Objective Measures updated at progress report only unless specified.     TREATMENT     Maricarmen received the treatments listed below:     MANUAL THERAPY TECHNIQUES were applied for (8) minutes, including:  Thera Roller to lateral hip  Long [place] Hip Distraction on left    THERAPEUTIC EXERCISES to develop strength, endurance, ROM, flexibility, posture, and core stabilization for (17) minutes including:  Bike 5 minutes   Bridges with ball at feet 3x10  Sidelying Clams with ball at feet 3x10  Hip Internal Rotation with ball at knees 3x10  Supine Hip Abduction 3x10 red band  Piriformis Stretch [id number]  Prone Hip Extension 10x    NEUROMUSCULAR RE-EDUCATION ACTIVITIES to improve Balance, Coordination, Kinesthetic, Sense, Proprioception, and Posture for (20) minutes.  The following were included:  Heel  Raises 3x10  Toe Raises 3x10  Single Leg Balance [id number]  Step Ups Forward 2x10 6inch  Step Ups Side 20x each 6inch  Side Steps 3x10  Sit to Stand 3x10  Standing Hip Extension 3x10    PATIENT EDUCATION AND HOME EXERCISES     Home Exercises Provided and Patient Education Provided     Education provided: (during session ) minutes  PURPOSE: Patient educated on the impairments noted above and the effects of physical therapy intervention to improve overall condition and QOL.   EXERCISE: Patient was educated on all the above exercise prior/during/after for proper posture, positioning, and execution for safe performance with home exercise program. Educated on the benefit of exercises in weight bearing to assist with managing osteopenia.   STRENGTH: Patient educated on the importance of improved core and extremity strength in order to improve alignment of the spine and extremities with static positions and dynamic movement.     Written Home Exercises Provided: yes.  Exercises were reviewed and Maricarmen was able to demonstrate them prior to the end of the session.  Maricarmen demonstrated good  understanding of the education provided. See EMR under Patient Instructions for exercises provided during therapy sessions.    ASSESSMENT     Repetitions were increased with side step ups to challenge patient more. Patient was able to perform prone hip extensions today and she was previously unable to perform these due to pain and weakness. Overall, she tolerated session well. She was educated on the importance of performing her HEP daily in order to keep progressing.     Maricarmen is progressing well towards her goals.   Pt prognosis is .     Pt will continue to benefit from skilled outpatient physical therapy to address the deficits listed in the problem list box on initial evaluation, provide pt/family education and to maximize pt's level of independence in the home and community environment.     Pt's spiritual, cultural and educational  needs considered and pt agreeable to plan of care and goals.     Anticipated Barriers for therapy: co-morbidities     Goals:  Short Term Goals: 4 weeks   1. Recent signs and systems trend is improving in order to progress towards LTG's.  2. Patient will be independent with HEP in order to further progress and return to maximal function.  3. Pain rating at Worst: 2/10 in order to progress towards increased independence with activity.     Long Term Goals: 8 weeks   1. Patient will improve glute med strength to 4/5 in order to go up and down stairs.  2. Patient will improve psoas strength to 4+/5 in order to walk long distances for exercise.  3. Patient will demonstrate normal gait mechanics in order to minimize any compensation and return to PLOF.   4. Patient will self report improvement to 40% limitation on the FOTO Hip Survey.      PLAN     Continue Plan of Care (POC) and progress per patient tolerance. See treatment section for details on planned progressions next session.      Isaiah Cerrato, PT , DPT

## 2022-12-07 ENCOUNTER — CLINICAL SUPPORT (OUTPATIENT)
Dept: REHABILITATION | Facility: HOSPITAL | Age: 67
End: 2022-12-07
Payer: COMMERCIAL

## 2022-12-07 DIAGNOSIS — M25.552 BILATERAL HIP PAIN: Primary | ICD-10-CM

## 2022-12-07 DIAGNOSIS — M25.551 BILATERAL HIP PAIN: Primary | ICD-10-CM

## 2022-12-07 PROCEDURE — 97140 MANUAL THERAPY 1/> REGIONS: CPT | Mod: PN

## 2022-12-07 PROCEDURE — 97110 THERAPEUTIC EXERCISES: CPT | Mod: PN

## 2022-12-07 PROCEDURE — 97112 NEUROMUSCULAR REEDUCATION: CPT | Mod: PN

## 2022-12-07 NOTE — PROGRESS NOTES
[facility]   Physical Therapy Treatment Note     Name: Maricarmen Christie [facility]  Clinic Number: [id number]    Therapy Diagnosis:   Encounter Diagnosis   Name Primary?    Bilateral hip pain Yes     Physician: Donis Monroy,*    Visit Date: 12/7/2022    Physician Orders: PT Eval and Treat   Medical Diagnosis from Referral: M70.61,M70.62 (ICD-10-CM) - Trochanteric bursitis of both hips  Evaluation Date: 10/11/2022  Authorization Period Expiration: 12/31/2022  Plan of Care Expiration: 12/11/2022  Progress Note Due: 11/11/2022  Visit # / Visits authorized: 12/20 (+ evaluation)  FOTO: 1/3      PTA Visit #: 0/5     Precautions: Standard    Time In: 11:30am  Time Out: 12:15pm  Total Billable Time: 45 minutes     SUBJECTIVE     Patient reports: She was hurting yesterday but feels better today.   She was compliant with home exercise program.  Response to previous treatment: felt okay after evaluation  Functional change: decreased standing and sitting tolerance, decreased sleep in her side left worse than right, decreased carrying laundry    Pain: 3/10     Location: bilateral hips    OBJECTIVE     Objective Measures updated at progress report only unless specified.     TREATMENT     Maricarmen received the treatments listed below:     MANUAL THERAPY TECHNIQUES were applied for (8) minutes, including:  Thera Roller to lateral hip  Long [place] Hip Distraction on left    THERAPEUTIC EXERCISES to develop strength, endurance, ROM, flexibility, posture, and core stabilization for (17) minutes including:  Bike 5 minutes   Bridges with ball at feet 3x10  Sidelying Clams with ball at feet 3x10  Hip Internal Rotation with ball at knees 3x10  Supine Hip Abduction 3x10 red band  Piriformis Stretch [id number]  Prone Hip Extension 10x    NEUROMUSCULAR RE-EDUCATION ACTIVITIES to improve Balance, Coordination, Kinesthetic, Sense, Proprioception, and Posture for (20) minutes.  The following were included:  Heel  Raises 3x10  Toe Raises 3x10  Single Leg Balance [id number]  Step Ups Forward 2x10 6inch  Step Ups Side 20x each 6inch  Side Steps 3x10  Sit to Stand 3x10  Standing Hip Extension 3x10    PATIENT EDUCATION AND HOME EXERCISES     Home Exercises Provided and Patient Education Provided     Education provided: (during session ) minutes  PURPOSE: Patient educated on the impairments noted above and the effects of physical therapy intervention to improve overall condition and QOL.   EXERCISE: Patient was educated on all the above exercise prior/during/after for proper posture, positioning, and execution for safe performance with home exercise program. Educated on the benefit of exercises in weight bearing to assist with managing osteopenia.   STRENGTH: Patient educated on the importance of improved core and extremity strength in order to improve alignment of the spine and extremities with static positions and dynamic movement.     Written Home Exercises Provided: yes.  Exercises were reviewed and Maricarmen was able to demonstrate them prior to the end of the session.  Maricarmen demonstrated good  understanding of the education provided. See EMR under Patient Instructions for exercises provided during therapy sessions.    ASSESSMENT     Patient had increased fatigue and discomfort in the left hip today. Patient was advised to work on her exercises more at home. Patient still requires cueing on correct form of each movement.     Maricarmen is progressing well towards her goals.   Pt prognosis is .     Pt will continue to benefit from skilled outpatient physical therapy to address the deficits listed in the problem list box on initial evaluation, provide pt/family education and to maximize pt's level of independence in the home and community environment.     Pt's spiritual, cultural and educational needs considered and pt agreeable to plan of care and goals.     Anticipated Barriers for therapy: co-morbidities     Goals:  Short Term  Goals: 4 weeks   1. Recent signs and systems trend is improving in order to progress towards LTG's.  2. Patient will be independent with HEP in order to further progress and return to maximal function.  3. Pain rating at Worst: 2/10 in order to progress towards increased independence with activity.     Long Term Goals: 8 weeks   1. Patient will improve glute med strength to 4/5 in order to go up and down stairs.  2. Patient will improve psoas strength to 4+/5 in order to walk long distances for exercise.  3. Patient will demonstrate normal gait mechanics in order to minimize any compensation and return to PLOF.   4. Patient will self report improvement to 40% limitation on the FOTO Hip Survey.      PLAN     Continue Plan of Care (POC) and progress per patient tolerance. See treatment section for details on planned progressions next session.      Isaiah Cerrato, PT , DPT

## 2022-12-09 ENCOUNTER — CLINICAL SUPPORT (OUTPATIENT)
Dept: REHABILITATION | Facility: HOSPITAL | Age: 67
End: 2022-12-09
Payer: MEDICARE

## 2022-12-09 DIAGNOSIS — M25.552 BILATERAL HIP PAIN: Primary | ICD-10-CM

## 2022-12-09 DIAGNOSIS — M25.551 BILATERAL HIP PAIN: Primary | ICD-10-CM

## 2022-12-09 PROCEDURE — 97110 THERAPEUTIC EXERCISES: CPT | Mod: PN

## 2022-12-09 PROCEDURE — 97112 NEUROMUSCULAR REEDUCATION: CPT | Mod: PN

## 2022-12-09 PROCEDURE — 97140 MANUAL THERAPY 1/> REGIONS: CPT | Mod: PN

## 2022-12-09 NOTE — PROGRESS NOTES
[facility]   Physical Therapy Treatment Note     Name: Maricarmen Christie [facility]  Clinic Number: [id number]    Therapy Diagnosis:   Encounter Diagnosis   Name Primary?    Bilateral hip pain Yes     Physician: Donis Monroy,*    Visit Date: 12/9/2022    Physician Orders: PT Eval and Treat   Medical Diagnosis from Referral: M70.61,M70.62 (ICD-10-CM) - Trochanteric bursitis of both hips  Evaluation Date: 10/11/2022  Authorization Period Expiration: 12/31/2022  Plan of Care Expiration: 12/11/2022  Progress Note Due: 11/11/2022  Visit # / Visits authorized: 13/20 (+ evaluation)  FOTO: 1/3      PTA Visit #: 0/5     Precautions: Standard    Time In: 9:50am  Time Out: 10:35am  Total Billable Time: 43 minutes     SUBJECTIVE     Patient reports: Her hip is bothering her some today. She states she did have a long car ride yesterday.   She was compliant with home exercise program.  Response to previous treatment: felt okay after evaluation  Functional change: decreased standing and sitting tolerance, decreased sleep in her side left worse than right, decreased carrying laundry    Pain: 3/10     Location: bilateral hips    OBJECTIVE     Objective Measures updated at progress report only unless specified.     TREATMENT     Maricarmen received the treatments listed below:     MANUAL THERAPY TECHNIQUES were applied for (8) minutes, including:  Thera Roller to lateral hip  Long [place] Hip Distraction on left    THERAPEUTIC EXERCISES to develop strength, endurance, ROM, flexibility, posture, and core stabilization for (15) minutes including:  Bike 5 minutes   Bridges with ball 3x10  Sidelying Clams with ball at feet 3x10  Hip Internal Rotation with ball at knees 3x10  Supine Hip Abduction 3x10 red band  HS Curls on swiss ball 3x10  Piriformis Stretch [id number]  Prone Hip Extension 10x    NEUROMUSCULAR RE-EDUCATION ACTIVITIES to improve Balance, Coordination, Kinesthetic, Sense, Proprioception, and Posture  for (20) minutes.  The following were included:  Heel Raises 3x10  Toe Raises 3x10  Single Leg Balance [id number]  Step Ups Forward 2x10 6inch  Step Ups Side 20x each 6inch  Side Steps 3x10  Sit to Stand 3x10  Standing Hip Extension 3x10    PATIENT EDUCATION AND HOME EXERCISES     Home Exercises Provided and Patient Education Provided     Education provided: (during session ) minutes  PURPOSE: Patient educated on the impairments noted above and the effects of physical therapy intervention to improve overall condition and QOL.   EXERCISE: Patient was educated on all the above exercise prior/during/after for proper posture, positioning, and execution for safe performance with home exercise program. Educated on the benefit of exercises in weight bearing to assist with managing osteopenia.   STRENGTH: Patient educated on the importance of improved core and extremity strength in order to improve alignment of the spine and extremities with static positions and dynamic movement.     Written Home Exercises Provided: yes.  Exercises were reviewed and Maricarmen was able to demonstrate them prior to the end of the session.  Maricarmen demonstrated good  understanding of the education provided. See EMR under Patient Instructions for exercises provided during therapy sessions.    ASSESSMENT     Patient was unable to do prone hip extension today due to hip and leg pain. Patient was advised to be more consistent with her HEP daily and with walking more. At this time endurance and pain limit her the most with little improvements due to inconsistent time with Hep outside of clinic.     Maricarmen is progressing well towards her goals.   Pt prognosis is .     Pt will continue to benefit from skilled outpatient physical therapy to address the deficits listed in the problem list box on initial evaluation, provide pt/family education and to maximize pt's level of independence in the home and community environment.     Pt's spiritual, cultural and  educational needs considered and pt agreeable to plan of care and goals.     Anticipated Barriers for therapy: co-morbidities     Goals:  Short Term Goals: 4 weeks   1. Recent signs and systems trend is improving in order to progress towards LTG's.  2. Patient will be independent with HEP in order to further progress and return to maximal function.  3. Pain rating at Worst: 2/10 in order to progress towards increased independence with activity.     Long Term Goals: 8 weeks   1. Patient will improve glute med strength to 4/5 in order to go up and down stairs.  2. Patient will improve psoas strength to 4+/5 in order to walk long distances for exercise.  3. Patient will demonstrate normal gait mechanics in order to minimize any compensation and return to PLOF.   4. Patient will self report improvement to 40% limitation on the FOTO Hip Survey.      PLAN     Continue Plan of Care (POC) and progress per patient tolerance. See treatment section for details on planned progressions next session.      Isaiah Cerrato, PT , DPT

## 2022-12-14 ENCOUNTER — CLINICAL SUPPORT (OUTPATIENT)
Dept: REHABILITATION | Facility: HOSPITAL | Age: 67
End: 2022-12-14
Payer: MEDICARE

## 2022-12-14 DIAGNOSIS — M25.551 BILATERAL HIP PAIN: Primary | ICD-10-CM

## 2022-12-14 DIAGNOSIS — M25.552 BILATERAL HIP PAIN: Primary | ICD-10-CM

## 2022-12-14 PROCEDURE — 97112 NEUROMUSCULAR REEDUCATION: CPT | Mod: PN

## 2022-12-14 PROCEDURE — 97110 THERAPEUTIC EXERCISES: CPT | Mod: PN

## 2022-12-14 PROCEDURE — 97140 MANUAL THERAPY 1/> REGIONS: CPT | Mod: PN

## 2022-12-14 NOTE — PROGRESS NOTES
[facility]   Physical Therapy Treatment Note     Name: Maricarmen Christie [facility]  Clinic Number: [id number]    Therapy Diagnosis:   Encounter Diagnosis   Name Primary?    Bilateral hip pain Yes     Physician: Donis Monroy,*    Visit Date: 12/14/2022    Physician Orders: PT Eval and Treat   Medical Diagnosis from Referral: M70.61,M70.62 (ICD-10-CM) - Trochanteric bursitis of both hips  Evaluation Date: 10/11/2022  Authorization Period Expiration: 12/31/2022  Plan of Care Expiration: 12/11/2022  Progress Note Due: 11/11/2022  Visit # / Visits authorized: 14/20 (+ evaluation)  FOTO: 1/3      PTA Visit #: 0/5     Precautions: Standard    Time In: 11:20am  Time Out: 12:10am  Total Billable Time: 45 minutes     SUBJECTIVE     Patient reports: She was feeling both hips yesterday after a long car ride but she feels alright today.   She was compliant with home exercise program.  Response to previous treatment: felt okay after evaluation  Functional change: decreased standing and sitting tolerance, decreased sleep in her side left worse than right, decreased carrying laundry    Pain: 3/10     Location: bilateral hips    OBJECTIVE     Objective Measures updated at progress report only unless specified.     TREATMENT     Mraicarmen received the treatments listed below:     MANUAL THERAPY TECHNIQUES were applied for (10) minutes, including:  Thera Roller to lateral hip  Long [place] Hip Distraction on left    THERAPEUTIC EXERCISES to develop strength, endurance, ROM, flexibility, posture, and core stabilization for (15) minutes including:  Bike 5 minutes   Bridges with ball 3x10  Sidelying Clams with ball at feet 3x10  Hip Internal Rotation with ball at knees 3x10  Supine Hip Abduction 3x10 red band  HS Curls on swiss ball 3x10  Piriformis Stretch [id number]  Prone Hip Extension 10x    NEUROMUSCULAR RE-EDUCATION ACTIVITIES to improve Balance, Coordination, Kinesthetic, Sense, Proprioception, and  Posture for (20) minutes.  The following were included:  Heel Raises 3x10  Toe Raises 3x10  Single Leg Balance [id number]  Step Ups Forward 2x10 6inch  Step Ups Side 20x each 6inch  Side Steps 3x10  Sit to Stand 3x10  Standing Hip Extension 3x10    PATIENT EDUCATION AND HOME EXERCISES     Home Exercises Provided and Patient Education Provided     Education provided: (during session ) minutes  PURPOSE: Patient educated on the impairments noted above and the effects of physical therapy intervention to improve overall condition and QOL.   EXERCISE: Patient was educated on all the above exercise prior/during/after for proper posture, positioning, and execution for safe performance with home exercise program. Educated on the benefit of exercises in weight bearing to assist with managing osteopenia.   STRENGTH: Patient educated on the importance of improved core and extremity strength in order to improve alignment of the spine and extremities with static positions and dynamic movement.     Written Home Exercises Provided: yes.  Exercises were reviewed and Maricarmen was able to demonstrate them prior to the end of the session.  Maricarmen demonstrated good  understanding of the education provided. See EMR under Patient Instructions for exercises provided during therapy sessions.    ASSESSMENT     Patient tolerated today's session well. She was advised to keep working on her HEP daily to help with tension and stiffness.     Maricarmen is progressing well towards her goals.   Pt prognosis is .     Pt will continue to benefit from skilled outpatient physical therapy to address the deficits listed in the problem list box on initial evaluation, provide pt/family education and to maximize pt's level of independence in the home and community environment.     Pt's spiritual, cultural and educational needs considered and pt agreeable to plan of care and goals.     Anticipated Barriers for therapy: co-morbidities     Goals:  Short Term Goals: 4  weeks   1. Recent signs and systems trend is improving in order to progress towards LTG's.  2. Patient will be independent with HEP in order to further progress and return to maximal function.  3. Pain rating at Worst: 2/10 in order to progress towards increased independence with activity.     Long Term Goals: 8 weeks   1. Patient will improve glute med strength to 4/5 in order to go up and down stairs.  2. Patient will improve psoas strength to 4+/5 in order to walk long distances for exercise.  3. Patient will demonstrate normal gait mechanics in order to minimize any compensation and return to PLOF.   4. Patient will self report improvement to 40% limitation on the FOTO Hip Survey.      PLAN     Continue Plan of Care (POC) and progress per patient tolerance. See treatment section for details on planned progressions next session.      Isaiah Cerrato, PT , DPT

## 2023-01-12 ENCOUNTER — PATIENT MESSAGE (OUTPATIENT)
Dept: RHEUMATOLOGY | Facility: CLINIC | Age: 68
End: 2023-01-12
Payer: MEDICARE

## 2023-01-16 ENCOUNTER — PATIENT MESSAGE (OUTPATIENT)
Dept: RHEUMATOLOGY | Facility: CLINIC | Age: 68
End: 2023-01-16
Payer: MEDICARE

## 2023-01-20 ENCOUNTER — PATIENT OUTREACH (OUTPATIENT)
Dept: ADMINISTRATIVE | Facility: HOSPITAL | Age: 68
End: 2023-01-20
Payer: MEDICARE

## 2023-01-20 NOTE — PROGRESS NOTES
Working Mammogram Report:     Pt overdue for mammogram. Called to offer scheduling.  Pt will schedule externally  at [facility]  
Less than Monthly

## 2023-01-24 DIAGNOSIS — L40.9 PSORIASIS: ICD-10-CM

## 2023-01-24 DIAGNOSIS — L40.50 PSORIATIC ARTHRITIS: ICD-10-CM

## 2023-01-24 RX ORDER — ADALIMUMAB 40MG/0.4ML
KIT SUBCUTANEOUS
Qty: 2 PEN | Refills: 12 | Status: SHIPPED | OUTPATIENT
Start: 2023-01-24 | End: 2023-08-29

## 2023-01-24 NOTE — TELEPHONE ENCOUNTER
----- Message from Kierra Queen sent at 1/24/2023 12:15 PM CST -----  Contact: Cheryl([facility])-[phone]  Type:  RX Refill Request    Who Called: Cheryl  Refill or New Rx:Refill  RX Name and Strength:adalimumab (HUMIRA,CF, PEN) 40 mg/0.4 mL PnKt  How is the patient currently taking it? (ex. 1XDay):1x14 days  Is this a 30 day or 90 day RX:2 pens  Preferred Pharmacy with phone number:  [facility] - [place], [place] - [address]  [address]  [address]  [facility] [address]  Phone: [phone] Fax: [phone]  Local or Mail Order:Mail order  Ordering Provider:Dr. Monroy  Would the patient rather a call back or a response via [facility]? Call back  Best Call Back Number:[phone]  Additional Information:

## 2023-01-30 ENCOUNTER — TELEPHONE (OUTPATIENT)
Dept: RHEUMATOLOGY | Facility: CLINIC | Age: 68
End: 2023-01-30
Payer: MEDICARE

## 2023-01-30 DIAGNOSIS — D84.9 IMMUNOCOMPROMISED: Primary | ICD-10-CM

## 2023-01-30 NOTE — TELEPHONE ENCOUNTER
Last TB gold 1/20/2020, pharmacy requesting updated test. Do you want to add to upcoming lab appt in February?

## 2023-01-30 NOTE — TELEPHONE ENCOUNTER
----- Message from Gina Julio sent at 1/30/2023  9:36 AM CST -----  Type:  Pharmacy Calling to Clarify an RX    Name of Caller:Kim Lopez  Pharmacy Name:[facility]  Prescription Name:  What do they need to clarify?:Trying to find out about the results from TB test  Best Call Back Number:[phone]  Additional Information:

## 2023-02-22 ENCOUNTER — LAB VISIT (OUTPATIENT)
Dept: LAB | Facility: HOSPITAL | Age: 68
End: 2023-02-22
Attending: INTERNAL MEDICINE
Payer: MEDICARE

## 2023-02-22 ENCOUNTER — OFFICE VISIT (OUTPATIENT)
Dept: RHEUMATOLOGY | Facility: CLINIC | Age: 68
End: 2023-02-22
Payer: MEDICARE

## 2023-02-22 VITALS
SYSTOLIC BLOOD PRESSURE: 131 MMHG | WEIGHT: 148.81 LBS | DIASTOLIC BLOOD PRESSURE: 92 MMHG | HEART RATE: 67 BPM | BODY MASS INDEX: 23.36 KG/M2 | HEIGHT: 67 IN

## 2023-02-22 DIAGNOSIS — Z79.899 ENCOUNTER FOR LONG-TERM (CURRENT) USE OF HIGH-RISK MEDICATION: ICD-10-CM

## 2023-02-22 DIAGNOSIS — M47.817 LUMBAR AND SACRAL OSTEOARTHRITIS: ICD-10-CM

## 2023-02-22 DIAGNOSIS — L40.9 PSORIASIS: ICD-10-CM

## 2023-02-22 DIAGNOSIS — L40.50 PSORIATIC ARTHRITIS: Primary | ICD-10-CM

## 2023-02-22 DIAGNOSIS — D84.9 IMMUNOCOMPROMISED: ICD-10-CM

## 2023-02-22 DIAGNOSIS — G62.9 NEUROPATHY: ICD-10-CM

## 2023-02-22 PROCEDURE — 99999 PR PBB SHADOW E&M-EST. PATIENT-LVL III: ICD-10-PCS | Mod: PBBFAC,,, | Performed by: INTERNAL MEDICINE

## 2023-02-22 PROCEDURE — 99215 OFFICE O/P EST HI 40 MIN: CPT | Mod: S$PBB,,, | Performed by: INTERNAL MEDICINE

## 2023-02-22 PROCEDURE — 99213 OFFICE O/P EST LOW 20 MIN: CPT | Mod: PBBFAC | Performed by: INTERNAL MEDICINE

## 2023-02-22 PROCEDURE — 86480 TB TEST CELL IMMUN MEASURE: CPT | Performed by: INTERNAL MEDICINE

## 2023-02-22 PROCEDURE — 99215 PR OFFICE/OUTPT VISIT, EST, LEVL V, 40-54 MIN: ICD-10-PCS | Mod: S$PBB,,, | Performed by: INTERNAL MEDICINE

## 2023-02-22 PROCEDURE — 99999 PR PBB SHADOW E&M-EST. PATIENT-LVL III: CPT | Mod: PBBFAC,,, | Performed by: INTERNAL MEDICINE

## 2023-02-22 RX ORDER — CELECOXIB 200 MG/1
200 CAPSULE ORAL DAILY
COMMUNITY
End: 2023-02-22 | Stop reason: SDUPTHER

## 2023-02-22 RX ORDER — DULOXETIN HYDROCHLORIDE 30 MG/1
30 CAPSULE, DELAYED RELEASE ORAL DAILY
Qty: 90 CAPSULE | Refills: 3 | Status: SHIPPED | OUTPATIENT
Start: 2023-02-22 | End: 2024-02-06

## 2023-02-22 RX ORDER — CELECOXIB 200 MG/1
200 CAPSULE ORAL 2 TIMES DAILY
Qty: 180 CAPSULE | Refills: 3 | Status: SHIPPED | OUTPATIENT
Start: 2023-02-22 | End: 2023-08-01

## 2023-02-22 RX ORDER — CLOBETASOL PROPIONATE 0.5 MG/G
CREAM TOPICAL 2 TIMES DAILY
Qty: 30 G | Refills: 0 | Status: SHIPPED | OUTPATIENT
Start: 2023-02-22 | End: 2024-02-06

## 2023-02-22 NOTE — PROGRESS NOTES
RHEUMATOLOGY CLINIC FOLLOW UP VISIT  Chief complaints, HPI, ROS, EXAM, Assessment & Plans:-  Maricarmen Ho a 67 y.o. pleasant female comes in for follow-up for plaque psoriasis and psoriatic arthritis.  She complains of worsening left-sided sciatica and diffuse body pain.  No inflammatory back pain.  Mild plaque psoriasis in ears . No flare of psoriatic arthritis on Humira and leflunomide. Rheumatological review of system negative otherwise.  Exam shows no synovitis, dactylitis, enthesitis or effusion. Lower    1. Psoriatic arthritis    2. Psoriasis    3. Immunocompromised    4. Encounter for long-term (current) use of high-risk medication      Problem List Items Addressed This Visit       Psoriatic arthritis - Primary    Psoriasis    Encounter for long-term (current) use of high-risk medication    Immunocompromised      Latest Reference Range & Units 11/18/22 09:44   WBC 3.90 - 12.70 K/uL 5.10   RBC 4.00 - 5.40 M/uL 4.63   Hemoglobin 12.0 - 16.0 g/dL 13.6   Hematocrit 37.0 - 48.5 % 43.1   MCV 82 - 98 fL 93   MCH 27.0 - 31.0 pg 29.4   MCHC 32.0 - 36.0 g/dL 31.6 (L)   RDW 11.5 - 14.5 % 13.9   Platelets 150 - 450 K/uL 223   MPV 9.2 - 12.9 fL 9.9   Gran % 38.0 - 73.0 % 40.6   Lymph % 18.0 - 48.0 % 41.0   Mono % 4.0 - 15.0 % 12.5   Eosinophil % 0.0 - 8.0 % 4.7   Basophil % 0.0 - 1.9 % 1.2   Immature Granulocytes 0.0 - 0.5 % 0.0   Gran # (ANC) 1.8 - 7.7 K/uL 2.1   Lymph # 1.0 - 4.8 K/uL 2.1   Mono # 0.3 - 1.0 K/uL 0.6   Eos # 0.0 - 0.5 K/uL 0.2   Baso # 0.00 - 0.20 K/uL 0.06   Immature Grans (Abs) 0.00 - 0.04 K/uL 0.00   nRBC 0 /100 WBC 0   Differential Method  Automated   Sed Rate 0 - 36 mm/Hr 14   Sodium 136 - 145 mmol/L 141   Potassium 3.5 - 5.1 mmol/L 5.4 (H)   Chloride 95 - 110 mmol/L 105   CO2 23 - 29 mmol/L 28   Anion Gap 8 - 16 mmol/L 8   BUN 8 - 23 mg/dL 17   Creatinine 0.5 - 1.4 mg/dL 0.7   eGFR >60 mL/min/1.73 m^2 >60   Glucose 70 - 110 mg/dL 92   Calcium 8.7 - 10.5 mg/dL 10.2    Alkaline Phosphatase 55 - 135 U/L 68   PROTEIN TOTAL 6.0 - 8.4 g/dL 7.8   Albumin 3.5 - 5.2 g/dL 4.0   BILIRUBIN TOTAL 0.1 - 1.0 mg/dL 0.5   AST 10 - 40 U/L 24   ALT 10 - 44 U/L 27   CRP 0.0 - 8.2 mg/L 0.9   (L): Data is abnormally low  (H): Data is abnormally high    Worsening back pain. Consult back and spine clinic.    Mild recurrence of plaque psoriasis in ears. Try topical steroids.  Stable PsA.  Continue Humira.  Discontinue Arava and restart Celebrex for osteoarthritis pain.   Restart cymbalta for OA pain.   Hold Humira if any infection.  Safety labs normal from today.  Repeat labs in 3 months and before next visit in 6 months.      Total time spent 45 minutes including time needed to review the records, the   patient evaluation, documentation, face-to-face discussion with the patient,   more than 50% of the time was spent on coordination of care and counseling.    Level V visit.

## 2023-02-24 LAB
GAMMA INTERFERON BACKGROUND BLD IA-ACNC: 0.06 IU/ML
M TB IFN-G CD4+ BCKGRND COR BLD-ACNC: -0.01 IU/ML
MITOGEN IGNF BCKGRD COR BLD-ACNC: 9.94 IU/ML
TB GOLD PLUS: NEGATIVE
TB2 - NIL: 0.01 IU/ML

## 2023-02-28 ENCOUNTER — PATIENT MESSAGE (OUTPATIENT)
Dept: INTERNAL MEDICINE | Facility: CLINIC | Age: 68
End: 2023-02-28
Payer: MEDICARE

## 2023-04-28 ENCOUNTER — LAB VISIT (OUTPATIENT)
Dept: LAB | Facility: HOSPITAL | Age: 68
End: 2023-04-28
Attending: FAMILY MEDICINE
Payer: MEDICARE

## 2023-04-28 DIAGNOSIS — E03.9 HYPOTHYROIDISM, UNSPECIFIED TYPE: ICD-10-CM

## 2023-04-28 DIAGNOSIS — E78.00 HYPERCHOLESTEREMIA: ICD-10-CM

## 2023-04-28 LAB
ALBUMIN SERPL BCP-MCNC: 3.9 G/DL (ref 3.5–5.2)
ALP SERPL-CCNC: 85 U/L (ref 55–135)
ALT SERPL W/O P-5'-P-CCNC: 18 U/L (ref 10–44)
ANION GAP SERPL CALC-SCNC: 11 MMOL/L (ref 8–16)
AST SERPL-CCNC: 19 U/L (ref 10–40)
BILIRUB SERPL-MCNC: 0.6 MG/DL (ref 0.1–1)
BUN SERPL-MCNC: 22 MG/DL (ref 8–23)
CALCIUM SERPL-MCNC: 10.6 MG/DL (ref 8.7–10.5)
CHLORIDE SERPL-SCNC: 104 MMOL/L (ref 95–110)
CHOLEST SERPL-MCNC: 269 MG/DL (ref 120–199)
CHOLEST SERPL-MCNC: 269 MG/DL (ref 120–199)
CHOLEST/HDLC SERPL: 3.6 {RATIO} (ref 2–5)
CHOLEST/HDLC SERPL: 3.6 {RATIO} (ref 2–5)
CO2 SERPL-SCNC: 25 MMOL/L (ref 23–29)
CREAT SERPL-MCNC: 0.8 MG/DL (ref 0.5–1.4)
EST. GFR  (NO RACE VARIABLE): >60 ML/MIN/1.73 M^2
GLUCOSE SERPL-MCNC: 88 MG/DL (ref 70–110)
HDLC SERPL-MCNC: 74 MG/DL (ref 40–75)
HDLC SERPL-MCNC: 74 MG/DL (ref 40–75)
HDLC SERPL: 27.5 % (ref 20–50)
HDLC SERPL: 27.5 % (ref 20–50)
LDLC SERPL CALC-MCNC: 181.8 MG/DL (ref 63–159)
LDLC SERPL CALC-MCNC: 181.8 MG/DL (ref 63–159)
NONHDLC SERPL-MCNC: 195 MG/DL
NONHDLC SERPL-MCNC: 195 MG/DL
POTASSIUM SERPL-SCNC: 4.7 MMOL/L (ref 3.5–5.1)
PROT SERPL-MCNC: 7.9 G/DL (ref 6–8.4)
SODIUM SERPL-SCNC: 140 MMOL/L (ref 136–145)
T4 FREE SERPL-MCNC: 1.5 NG/DL (ref 0.71–1.51)
TRIGL SERPL-MCNC: 66 MG/DL (ref 30–150)
TRIGL SERPL-MCNC: 66 MG/DL (ref 30–150)
TSH SERPL DL<=0.005 MIU/L-ACNC: 0.12 UIU/ML (ref 0.4–4)

## 2023-04-28 PROCEDURE — 36415 COLL VENOUS BLD VENIPUNCTURE: CPT | Performed by: FAMILY MEDICINE

## 2023-04-28 PROCEDURE — 84443 ASSAY THYROID STIM HORMONE: CPT | Performed by: FAMILY MEDICINE

## 2023-04-28 PROCEDURE — 80061 LIPID PANEL: CPT | Performed by: FAMILY MEDICINE

## 2023-04-28 PROCEDURE — 84439 ASSAY OF FREE THYROXINE: CPT | Performed by: FAMILY MEDICINE

## 2023-04-28 PROCEDURE — 80053 COMPREHEN METABOLIC PANEL: CPT | Performed by: FAMILY MEDICINE

## 2023-05-01 DIAGNOSIS — E03.9 HYPOTHYROIDISM, UNSPECIFIED TYPE: Primary | ICD-10-CM

## 2023-05-01 RX ORDER — LEVOTHYROXINE SODIUM 88 UG/1
88 TABLET ORAL
Qty: 30 TABLET | Refills: 11 | Status: SHIPPED | OUTPATIENT
Start: 2023-05-01 | End: 2024-04-30

## 2023-06-27 ENCOUNTER — LAB VISIT (OUTPATIENT)
Dept: LAB | Facility: HOSPITAL | Age: 68
End: 2023-06-27
Attending: FAMILY MEDICINE
Payer: MEDICARE

## 2023-06-27 DIAGNOSIS — E03.9 HYPOTHYROIDISM, UNSPECIFIED TYPE: ICD-10-CM

## 2023-06-27 LAB
CHOLEST SERPL-MCNC: 273 MG/DL (ref 120–199)
CHOLEST/HDLC SERPL: 4 {RATIO} (ref 2–5)
HDLC SERPL-MCNC: 68 MG/DL (ref 40–75)
HDLC SERPL: 24.9 % (ref 20–50)
LDLC SERPL CALC-MCNC: 192.2 MG/DL (ref 63–159)
NONHDLC SERPL-MCNC: 205 MG/DL
TRIGL SERPL-MCNC: 64 MG/DL (ref 30–150)
TSH SERPL DL<=0.005 MIU/L-ACNC: 0.5 UIU/ML (ref 0.4–4)

## 2023-06-27 PROCEDURE — 84443 ASSAY THYROID STIM HORMONE: CPT | Performed by: FAMILY MEDICINE

## 2023-06-27 PROCEDURE — 80061 LIPID PANEL: CPT | Performed by: FAMILY MEDICINE

## 2023-06-27 PROCEDURE — 36415 COLL VENOUS BLD VENIPUNCTURE: CPT | Performed by: FAMILY MEDICINE

## 2023-07-11 ENCOUNTER — OFFICE VISIT (OUTPATIENT)
Dept: INTERNAL MEDICINE | Facility: CLINIC | Age: 68
End: 2023-07-11
Payer: MEDICARE

## 2023-07-11 VITALS
DIASTOLIC BLOOD PRESSURE: 90 MMHG | SYSTOLIC BLOOD PRESSURE: 136 MMHG | HEIGHT: 67 IN | HEART RATE: 72 BPM | OXYGEN SATURATION: 96 % | WEIGHT: 151.44 LBS | TEMPERATURE: 97 F | BODY MASS INDEX: 23.77 KG/M2

## 2023-07-11 DIAGNOSIS — M85.80 OSTEOPENIA WITH HIGH RISK OF FRACTURE: ICD-10-CM

## 2023-07-11 DIAGNOSIS — R03.0 ELEVATED BLOOD PRESSURE READING: ICD-10-CM

## 2023-07-11 DIAGNOSIS — E78.00 HYPERCHOLESTEREMIA: ICD-10-CM

## 2023-07-11 DIAGNOSIS — E55.9 VITAMIN D DEFICIENCY: ICD-10-CM

## 2023-07-11 DIAGNOSIS — D84.9 IMMUNOCOMPROMISED: ICD-10-CM

## 2023-07-11 DIAGNOSIS — L40.50 PSORIATIC ARTHRITIS: ICD-10-CM

## 2023-07-11 DIAGNOSIS — E03.9 ACQUIRED HYPOTHYROIDISM: Primary | ICD-10-CM

## 2023-07-11 PROCEDURE — 99213 PR OFFICE/OUTPT VISIT, EST, LEVL III, 20-29 MIN: ICD-10-PCS | Mod: S$PBB,,, | Performed by: PHYSICIAN ASSISTANT

## 2023-07-11 PROCEDURE — 99213 OFFICE O/P EST LOW 20 MIN: CPT | Mod: S$PBB,,, | Performed by: PHYSICIAN ASSISTANT

## 2023-07-11 PROCEDURE — 99999 PR PBB SHADOW E&M-EST. PATIENT-LVL IV: ICD-10-PCS | Mod: PBBFAC,,, | Performed by: PHYSICIAN ASSISTANT

## 2023-07-11 PROCEDURE — 99214 OFFICE O/P EST MOD 30 MIN: CPT | Mod: PBBFAC | Performed by: PHYSICIAN ASSISTANT

## 2023-07-11 PROCEDURE — 99999 PR PBB SHADOW E&M-EST. PATIENT-LVL IV: CPT | Mod: PBBFAC,,, | Performed by: PHYSICIAN ASSISTANT

## 2023-07-11 NOTE — PROGRESS NOTES
Subjective:      Patient ID: Maricarmen Becerril is a 68 y.o. female.    Chief Complaint: No chief complaint on file.    HPI  Here today for a follow up for her medical problems. Adjusted her levothyroxine 2 months ago, reduced dose from 100 to 88. Recent labs will be reviewed today.   Does not want to start on cholesterol medication.   Pt/ot done int he past for her pain. Going to chiropractor which is helping.   Mammogram at [facility]. Pt needs to schedule.       BP Readings from Last 3 Encounters:   07/11/23 (!) 136/90   02/22/23 (!) 131/92   08/17/22 132/84      Patient Active Problem List   Diagnosis    Psoriatic arthritis    Hypothyroidism    Psoriasis    Back pain    Lumbar and sacral osteoarthritis    Encounter for long-term (current) use of high-risk medication    Immunocompromised    Shoulder pain, left    Allergic rhinitis    Vitamin D deficiency    Osteopenia with high risk of fracture    Colon cancer screening    Sciatica of left side    Hypercholesteremia    Trochanteric bursitis of both hips    Bilateral hip pain    Neuropathy         Current Outpatient Medications:     adalimumab (HUMIRA,CF, PEN) 40 mg/0.4 mL PnKt, INJECT 40 MG (0.4 ML) UNDER THE SKIN EVERY 14 DAYS, Disp: 2 pen, Rfl: 12    celecoxib (CELEBREX) 200 MG capsule, Take 1 capsule (200 mg total) by mouth 2 (two) times daily., Disp: 180 capsule, Rfl: 3    cholecalciferol, vitamin D3, (VITAMIN D3 ORAL), Take by mouth once daily., Disp: , Rfl:     clobetasoL (TEMOVATE) 0.05 % cream, Apply topically 2 (two) times daily., Disp: 30 g, Rfl: 0    DULoxetine (CYMBALTA) 30 MG capsule, Take 1 capsule (30 mg total) by mouth once daily., Disp: 90 capsule, Rfl: 3    levothyroxine (SYNTHROID) 88 MCG tablet, Take 1 tablet (88 mcg total) by mouth before breakfast., Disp: 30 tablet, Rfl: 11    TURMERIC ORAL, , Disp: , Rfl:     Review of Systems   Constitutional:  Negative for activity change, appetite change, chills, diaphoresis, fatigue, fever and  "unexpected weight change.   HENT: Negative.  Negative for congestion, hearing loss, postnasal drip, rhinorrhea, sore throat, trouble swallowing and voice change.    Eyes: Negative.  Negative for visual disturbance.   Respiratory: Negative.  Negative for cough, choking, chest tightness and shortness of breath.    Cardiovascular:  Negative for chest pain, palpitations and leg swelling.   Gastrointestinal:  Negative for abdominal distention, abdominal pain, blood in stool, constipation, diarrhea, nausea and vomiting.   Endocrine: Negative for cold intolerance, heat intolerance, polydipsia and polyuria.   Genitourinary: Negative.  Negative for difficulty urinating and frequency.   Musculoskeletal:  Negative for arthralgias, back pain, gait problem, joint swelling and myalgias.   Skin:  Negative for color change, pallor, rash and wound.   Neurological:  Negative for dizziness, tremors, weakness, light-headedness, numbness and headaches.   Hematological:  Negative for adenopathy.   Psychiatric/Behavioral:  Negative for behavioral problems, confusion, self-injury, sleep disturbance and suicidal ideas. The patient is not nervous/anxious.    Objective:   BP (!) 136/90 (BP Location: Right arm, Patient Position: Sitting, BP Method: Large (Manual))   Pulse 72   Temp 97.1 °F (36.2 °C) (Tympanic)   Ht 5' 7" (1.702 m)   Wt 68.7 kg (151 lb 7.3 oz)   SpO2 96%   BMI 23.72 kg/m²     Physical Exam  Vitals reviewed.   Constitutional:       General: She is not in acute distress.     Appearance: Normal appearance. She is well-developed. She is not ill-appearing, toxic-appearing or diaphoretic.   HENT:      Head: Normocephalic and atraumatic.      Right Ear: External ear normal.      Left Ear: External ear normal.      Nose: Nose normal.   Eyes:      Conjunctiva/sclera: Conjunctivae normal.      Pupils: Pupils are equal, round, and reactive to light.   Cardiovascular:      Rate and Rhythm: Normal rate and regular rhythm.      Heart " sounds: Normal heart sounds. No murmur heard.    No friction rub. No gallop.   Pulmonary:      Effort: Pulmonary effort is normal. No respiratory distress.      Breath sounds: Normal breath sounds. No wheezing or rales.   Chest:      Chest wall: No tenderness.   Abdominal:      General: There is no distension.      Palpations: Abdomen is soft.      Tenderness: There is no abdominal tenderness.   Musculoskeletal:         General: Normal range of motion.      Cervical back: Normal range of motion and neck supple.   Lymphadenopathy:      Cervical: No cervical adenopathy.   Skin:     General: Skin is warm and dry.      Capillary Refill: Capillary refill takes less than 2 seconds.      Findings: No rash.   Neurological:      Mental Status: She is alert and oriented to person, place, and time.      Motor: No weakness.      Coordination: Coordination normal.      Gait: Gait normal.   Psychiatric:         Mood and Affect: Mood normal.         Behavior: Behavior normal.         Thought Content: Thought content normal.         Judgment: Judgment normal.     Lab Visit on 06/27/2023   Component Date Value Ref Range Status    TSH 06/27/2023 0.502  0.400 - 4.000 uIU/mL Final    Cholesterol 06/27/2023 273 (H)  120 - 199 mg/dL Final    Comment: The National Cholesterol Education Program (NCEP) has set the  following guidelines (reference ranges) for Cholesterol:  Optimal.....................<200 mg/dL  Borderline High.............200-239 mg/dL  High........................> or = 240 mg/dL      Triglycerides 06/27/2023 64  30 - 150 mg/dL Final    Comment: The National Cholesterol Education Program (NCEP) has set the  following guidelines (reference values) for triglycerides:  Normal......................<150 mg/dL  Borderline High.............150-199 mg/dL  High........................200-499 mg/dL      HDL 06/27/2023 68  40 - 75 mg/dL Final    Comment: The National Cholesterol Education Program (NCEP) has set the  following  guidelines (reference values) for HDL Cholesterol:  Low...............<40 mg/dL  Optimal...........>60 mg/dL      LDL Cholesterol 06/27/2023 192.2 (H)  63.0 - 159.0 mg/dL Final    Comment: The National Cholesterol Education Program (NCEP) has set the  following guidelines (reference values) for LDL Cholesterol:  Optimal.......................<130 mg/dL  Borderline High...............130-159 mg/dL  High..........................160-189 mg/dL  Very High.....................>190 mg/dL      HDL/Cholesterol Ratio 06/27/2023 24.9  20.0 - 50.0 % Final    Total Cholesterol/HDL Ratio 06/27/2023 4.0  2.0 - 5.0 Final    Non-HDL Cholesterol 06/27/2023 205  mg/dL Final    Comment: Risk category and Non-HDL cholesterol goals:  Coronary heart disease (CHD)or equivalent (10-year risk of CHD >20%):  Non-HDL cholesterol goal     <130 mg/dL  Two or more CHD risk factors and 10-year risk of CHD <= 20%:  Non-HDL cholesterol goal     <160 mg/dL  0 to 1 CHD risk factor:  Non-HDL cholesterol goal     <190 mg/dL         Assessment:     1. Acquired hypothyroidism    2. Hypercholesteremia    3. Osteopenia with high risk of fracture    4. Vitamin D deficiency    5. Psoriatic arthritis    6. Immunocompromised    7. Elevated blood pressure reading      Plan:   Acquired hypothyroidism  -     TSH; Future; Expected date: 01/11/2024  -doing well on new dose of levothyroxine.   -recheck in 6 months    Hypercholesteremia  -     Lipid Panel; Future; Expected date: 01/11/2024  -declines to start on medication at this time. Risks discussed. Would like to work on lifestyle changes first.   -recheck lipid in 6 months    Osteopenia with high risk of fracture  -up to date with rheum    Vitamin D deficiency  -on vitamin D supplement    Psoriatic arthritis  Immunocompromised  -up to date with rheum    The 10-year ASCVD risk score (Michael LONGO, et al., 2019) is: 9.1%    Values used to calculate the score:      Age: 68 years      Sex: Female      Is Non-  : No      Diabetic: No      Tobacco smoker: No      Systolic Blood Pressure: 136 mmHg      Is BP treated: No      HDL Cholesterol: 68 mg/dL      Total Cholesterol: 273 mg/dL     -advised to get mammogram done and send records to us    Elevated blood pressure reading  -log bp for 1 week and follow up for review  -bp follow up in 1 week.     Follow up in about 1 week (around 7/18/2023), or if symptoms worsen or fail to improve.

## 2023-07-19 ENCOUNTER — PATIENT MESSAGE (OUTPATIENT)
Dept: INTERNAL MEDICINE | Facility: CLINIC | Age: 68
End: 2023-07-19
Payer: MEDICARE

## 2023-07-19 DIAGNOSIS — Z12.31 ENCOUNTER FOR SCREENING MAMMOGRAM FOR MALIGNANT NEOPLASM OF BREAST: Primary | ICD-10-CM

## 2023-08-01 ENCOUNTER — PATIENT MESSAGE (OUTPATIENT)
Dept: RHEUMATOLOGY | Facility: CLINIC | Age: 68
End: 2023-08-01
Payer: MEDICARE

## 2023-08-01 DIAGNOSIS — L40.50 PSORIATIC ARTHRITIS: ICD-10-CM

## 2023-08-01 RX ORDER — LEFLUNOMIDE 20 MG/1
TABLET ORAL
Qty: 16 TABLET | Refills: 2 | Status: SHIPPED | OUTPATIENT
Start: 2023-08-01 | End: 2023-08-29

## 2023-08-01 RX ORDER — CELECOXIB 200 MG/1
200 CAPSULE ORAL DAILY
Qty: 90 CAPSULE | Refills: 3 | Status: SHIPPED | OUTPATIENT
Start: 2023-08-01 | End: 2024-02-06

## 2023-08-01 NOTE — TELEPHONE ENCOUNTER
Restart Arava and reduce Celebrex to once daily.  Please schedule all 4 labs as soon as possible and repeat every 3 months.  Thanks.

## 2023-08-04 ENCOUNTER — PATIENT MESSAGE (OUTPATIENT)
Dept: INTERNAL MEDICINE | Facility: CLINIC | Age: 68
End: 2023-08-04
Payer: MEDICARE

## 2023-08-04 ENCOUNTER — LAB VISIT (OUTPATIENT)
Dept: LAB | Facility: HOSPITAL | Age: 68
End: 2023-08-04
Attending: INTERNAL MEDICINE
Payer: MEDICARE

## 2023-08-04 DIAGNOSIS — L40.50 PSORIATIC ARTHRITIS: ICD-10-CM

## 2023-08-04 LAB
ALBUMIN SERPL BCP-MCNC: 3.7 G/DL (ref 3.5–5.2)
ALP SERPL-CCNC: 74 U/L (ref 55–135)
ALT SERPL W/O P-5'-P-CCNC: 15 U/L (ref 10–44)
ANION GAP SERPL CALC-SCNC: 10 MMOL/L (ref 8–16)
AST SERPL-CCNC: 19 U/L (ref 10–40)
BASOPHILS # BLD AUTO: 0.04 K/UL (ref 0–0.2)
BASOPHILS NFR BLD: 0.7 % (ref 0–1.9)
BILIRUB SERPL-MCNC: 0.4 MG/DL (ref 0.1–1)
BUN SERPL-MCNC: 22 MG/DL (ref 8–23)
CALCIUM SERPL-MCNC: 10.4 MG/DL (ref 8.7–10.5)
CHLORIDE SERPL-SCNC: 105 MMOL/L (ref 95–110)
CO2 SERPL-SCNC: 26 MMOL/L (ref 23–29)
CREAT SERPL-MCNC: 0.7 MG/DL (ref 0.5–1.4)
CRP SERPL-MCNC: 2 MG/L (ref 0–8.2)
DIFFERENTIAL METHOD: NORMAL
EOSINOPHIL # BLD AUTO: 0.3 K/UL (ref 0–0.5)
EOSINOPHIL NFR BLD: 5.6 % (ref 0–8)
ERYTHROCYTE [DISTWIDTH] IN BLOOD BY AUTOMATED COUNT: 14 % (ref 11.5–14.5)
ERYTHROCYTE [SEDIMENTATION RATE] IN BLOOD BY PHOTOMETRIC METHOD: 12 MM/HR (ref 0–36)
EST. GFR  (NO RACE VARIABLE): >60 ML/MIN/1.73 M^2
GLUCOSE SERPL-MCNC: 84 MG/DL (ref 70–110)
HCT VFR BLD AUTO: 41.9 % (ref 37–48.5)
HGB BLD-MCNC: 13.5 G/DL (ref 12–16)
IMM GRANULOCYTES # BLD AUTO: 0.01 K/UL (ref 0–0.04)
IMM GRANULOCYTES NFR BLD AUTO: 0.2 % (ref 0–0.5)
LYMPHOCYTES # BLD AUTO: 1.9 K/UL (ref 1–4.8)
LYMPHOCYTES NFR BLD: 30.5 % (ref 18–48)
MCH RBC QN AUTO: 29.7 PG (ref 27–31)
MCHC RBC AUTO-ENTMCNC: 32.2 G/DL (ref 32–36)
MCV RBC AUTO: 92 FL (ref 82–98)
MONOCYTES # BLD AUTO: 0.6 K/UL (ref 0.3–1)
MONOCYTES NFR BLD: 10.4 % (ref 4–15)
NEUTROPHILS # BLD AUTO: 3.2 K/UL (ref 1.8–7.7)
NEUTROPHILS NFR BLD: 52.6 % (ref 38–73)
NRBC BLD-RTO: 0 /100 WBC
PLATELET # BLD AUTO: 259 K/UL (ref 150–450)
PMV BLD AUTO: 9.7 FL (ref 9.2–12.9)
POTASSIUM SERPL-SCNC: 5.3 MMOL/L (ref 3.5–5.1)
PROT SERPL-MCNC: 7.6 G/DL (ref 6–8.4)
RBC # BLD AUTO: 4.55 M/UL (ref 4–5.4)
SODIUM SERPL-SCNC: 141 MMOL/L (ref 136–145)
WBC # BLD AUTO: 6.06 K/UL (ref 3.9–12.7)

## 2023-08-04 PROCEDURE — 36415 COLL VENOUS BLD VENIPUNCTURE: CPT | Performed by: INTERNAL MEDICINE

## 2023-08-04 PROCEDURE — 80053 COMPREHEN METABOLIC PANEL: CPT | Performed by: INTERNAL MEDICINE

## 2023-08-04 PROCEDURE — 86140 C-REACTIVE PROTEIN: CPT | Performed by: INTERNAL MEDICINE

## 2023-08-04 PROCEDURE — 85652 RBC SED RATE AUTOMATED: CPT | Performed by: INTERNAL MEDICINE

## 2023-08-04 PROCEDURE — 85025 COMPLETE CBC W/AUTO DIFF WBC: CPT | Performed by: INTERNAL MEDICINE

## 2023-08-29 ENCOUNTER — LAB VISIT (OUTPATIENT)
Dept: LAB | Facility: HOSPITAL | Age: 68
End: 2023-08-29
Attending: INTERNAL MEDICINE
Payer: MEDICARE

## 2023-08-29 ENCOUNTER — OFFICE VISIT (OUTPATIENT)
Dept: RHEUMATOLOGY | Facility: CLINIC | Age: 68
End: 2023-08-29
Payer: MEDICARE

## 2023-08-29 VITALS
HEIGHT: 67 IN | RESPIRATION RATE: 18 BRPM | SYSTOLIC BLOOD PRESSURE: 127 MMHG | BODY MASS INDEX: 23.91 KG/M2 | DIASTOLIC BLOOD PRESSURE: 81 MMHG | WEIGHT: 152.31 LBS | HEART RATE: 74 BPM

## 2023-08-29 DIAGNOSIS — L40.50 PSORIATIC ARTHRITIS: Primary | ICD-10-CM

## 2023-08-29 DIAGNOSIS — L40.50 PSORIATIC ARTHRITIS: ICD-10-CM

## 2023-08-29 DIAGNOSIS — L40.9 PSORIASIS: ICD-10-CM

## 2023-08-29 DIAGNOSIS — M47.817 LUMBAR AND SACRAL OSTEOARTHRITIS: ICD-10-CM

## 2023-08-29 DIAGNOSIS — Z79.899 DRUG-INDUCED IMMUNODEFICIENCY: ICD-10-CM

## 2023-08-29 DIAGNOSIS — D84.9 IMMUNOCOMPROMISED: ICD-10-CM

## 2023-08-29 DIAGNOSIS — G62.9 NEUROPATHY: ICD-10-CM

## 2023-08-29 DIAGNOSIS — D84.821 DRUG-INDUCED IMMUNODEFICIENCY: ICD-10-CM

## 2023-08-29 LAB
ALBUMIN SERPL BCP-MCNC: 4.1 G/DL (ref 3.5–5.2)
ALP SERPL-CCNC: 82 U/L (ref 55–135)
ALT SERPL W/O P-5'-P-CCNC: 20 U/L (ref 10–44)
ANION GAP SERPL CALC-SCNC: 11 MMOL/L (ref 8–16)
AST SERPL-CCNC: 23 U/L (ref 10–40)
BASOPHILS # BLD AUTO: 0.05 K/UL (ref 0–0.2)
BASOPHILS NFR BLD: 0.9 % (ref 0–1.9)
BILIRUB SERPL-MCNC: 0.5 MG/DL (ref 0.1–1)
BUN SERPL-MCNC: 20 MG/DL (ref 8–23)
CALCIUM SERPL-MCNC: 10.5 MG/DL (ref 8.7–10.5)
CHLORIDE SERPL-SCNC: 103 MMOL/L (ref 95–110)
CO2 SERPL-SCNC: 27 MMOL/L (ref 23–29)
CREAT SERPL-MCNC: 0.7 MG/DL (ref 0.5–1.4)
CRP SERPL-MCNC: 1.7 MG/L (ref 0–8.2)
DIFFERENTIAL METHOD: ABNORMAL
EOSINOPHIL # BLD AUTO: 0.3 K/UL (ref 0–0.5)
EOSINOPHIL NFR BLD: 5.6 % (ref 0–8)
ERYTHROCYTE [DISTWIDTH] IN BLOOD BY AUTOMATED COUNT: 13.8 % (ref 11.5–14.5)
ERYTHROCYTE [SEDIMENTATION RATE] IN BLOOD BY PHOTOMETRIC METHOD: 37 MM/HR (ref 0–36)
EST. GFR  (NO RACE VARIABLE): >60 ML/MIN/1.73 M^2
GLUCOSE SERPL-MCNC: 92 MG/DL (ref 70–110)
HCT VFR BLD AUTO: 43.3 % (ref 37–48.5)
HGB BLD-MCNC: 13.8 G/DL (ref 12–16)
IMM GRANULOCYTES # BLD AUTO: 0.01 K/UL (ref 0–0.04)
IMM GRANULOCYTES NFR BLD AUTO: 0.2 % (ref 0–0.5)
LYMPHOCYTES # BLD AUTO: 2.1 K/UL (ref 1–4.8)
LYMPHOCYTES NFR BLD: 36.8 % (ref 18–48)
MCH RBC QN AUTO: 29.4 PG (ref 27–31)
MCHC RBC AUTO-ENTMCNC: 31.9 G/DL (ref 32–36)
MCV RBC AUTO: 92 FL (ref 82–98)
MONOCYTES # BLD AUTO: 0.5 K/UL (ref 0.3–1)
MONOCYTES NFR BLD: 9.3 % (ref 4–15)
NEUTROPHILS # BLD AUTO: 2.6 K/UL (ref 1.8–7.7)
NEUTROPHILS NFR BLD: 47.2 % (ref 38–73)
NRBC BLD-RTO: 0 /100 WBC
PLATELET # BLD AUTO: 287 K/UL (ref 150–450)
PMV BLD AUTO: 9.6 FL (ref 9.2–12.9)
POTASSIUM SERPL-SCNC: 5.3 MMOL/L (ref 3.5–5.1)
PROT SERPL-MCNC: 8.3 G/DL (ref 6–8.4)
RBC # BLD AUTO: 4.7 M/UL (ref 4–5.4)
SODIUM SERPL-SCNC: 141 MMOL/L (ref 136–145)
WBC # BLD AUTO: 5.57 K/UL (ref 3.9–12.7)

## 2023-08-29 PROCEDURE — 85025 COMPLETE CBC W/AUTO DIFF WBC: CPT | Performed by: INTERNAL MEDICINE

## 2023-08-29 PROCEDURE — 80053 COMPREHEN METABOLIC PANEL: CPT | Performed by: INTERNAL MEDICINE

## 2023-08-29 PROCEDURE — 99214 OFFICE O/P EST MOD 30 MIN: CPT | Mod: S$PBB,,, | Performed by: INTERNAL MEDICINE

## 2023-08-29 PROCEDURE — 99214 OFFICE O/P EST MOD 30 MIN: CPT | Mod: PBBFAC | Performed by: INTERNAL MEDICINE

## 2023-08-29 PROCEDURE — 85652 RBC SED RATE AUTOMATED: CPT | Performed by: INTERNAL MEDICINE

## 2023-08-29 PROCEDURE — 36415 COLL VENOUS BLD VENIPUNCTURE: CPT | Performed by: INTERNAL MEDICINE

## 2023-08-29 PROCEDURE — 99999 PR PBB SHADOW E&M-EST. PATIENT-LVL IV: CPT | Mod: PBBFAC,,, | Performed by: INTERNAL MEDICINE

## 2023-08-29 PROCEDURE — 99999 PR PBB SHADOW E&M-EST. PATIENT-LVL IV: ICD-10-PCS | Mod: PBBFAC,,, | Performed by: INTERNAL MEDICINE

## 2023-08-29 PROCEDURE — 86140 C-REACTIVE PROTEIN: CPT | Performed by: INTERNAL MEDICINE

## 2023-08-29 PROCEDURE — 99214 PR OFFICE/OUTPT VISIT, EST, LEVL IV, 30-39 MIN: ICD-10-PCS | Mod: S$PBB,,, | Performed by: INTERNAL MEDICINE

## 2023-08-29 RX ORDER — UPADACITINIB 15 MG/1
15 TABLET, EXTENDED RELEASE ORAL DAILY
Qty: 30 TABLET | Refills: 11 | Status: ACTIVE | OUTPATIENT
Start: 2023-08-29

## 2023-08-29 RX ORDER — AMOXICILLIN 500 MG
CAPSULE ORAL DAILY
COMMUNITY

## 2023-08-29 NOTE — PROGRESS NOTES
RHEUMATOLOGY CLINIC FOLLOW UP VISIT  Chief complaints, HPI, ROS, EXAM, Assessment & Plans:-  Maricarmen Ho a 68 y.o. pleasant female comes in for follow-up for plaque psoriasis and psoriatic arthritis.  She complains of worsening pain since last visit.  Worsening bilateral shoulder, hip joint pain associated with prolonged morning stiffness.  Worsening plaque psoriasis.  On Humira , Celebrex and leflunomide. Rheumatological review of system negative otherwise.  Exam shows tenderness of bilateral shoulder and hip joints.  Active plaque psoriasis behind the earlobe.    1. Psoriatic arthritis    2. Psoriasis    3. Drug-induced immunodeficiency    4. Neuropathy    5. Lumbar and sacral osteoarthritis      Problem List Items Addressed This Visit       Psoriatic arthritis - Primary    Psoriasis    Lumbar and sacral osteoarthritis    Neuropathy    Drug-induced immunodeficiency      Latest Reference Range & Units 08/29/23 09:43   WBC 3.90 - 12.70 K/uL 5.57   RBC 4.00 - 5.40 M/uL 4.70   Hemoglobin 12.0 - 16.0 g/dL 13.8   Hematocrit 37.0 - 48.5 % 43.3   MCV 82 - 98 fL 92   MCH 27.0 - 31.0 pg 29.4   MCHC 32.0 - 36.0 g/dL 31.9 (L)   RDW 11.5 - 14.5 % 13.8   Platelets 150 - 450 K/uL 287   MPV 9.2 - 12.9 fL 9.6   Gran % 38.0 - 73.0 % 47.2   Lymph % 18.0 - 48.0 % 36.8   Mono % 4.0 - 15.0 % 9.3   Eosinophil % 0.0 - 8.0 % 5.6   Basophil % 0.0 - 1.9 % 0.9   Immature Granulocytes 0.0 - 0.5 % 0.2   Gran # (ANC) 1.8 - 7.7 K/uL 2.6   Lymph # 1.0 - 4.8 K/uL 2.1   Mono # 0.3 - 1.0 K/uL 0.5   Eos # 0.0 - 0.5 K/uL 0.3   Baso # 0.00 - 0.20 K/uL 0.05   Immature Grans (Abs) 0.00 - 0.04 K/uL 0.01   nRBC 0 /100 WBC 0   Differential Method  Automated   Sodium 136 - 145 mmol/L 141   Potassium 3.5 - 5.1 mmol/L 5.3 (H)   Chloride 95 - 110 mmol/L 103   CO2 23 - 29 mmol/L 27   Anion Gap 8 - 16 mmol/L 11   BUN 8 - 23 mg/dL 20   Creatinine 0.5 - 1.4 mg/dL 0.7   eGFR >60 mL/min/1.73 m^2 >60   Glucose 70 - 110 mg/dL 92   Calcium 8.7 - 10.5  mg/dL 10.5   Alkaline Phosphatase 55 - 135 U/L 82   PROTEIN TOTAL 6.0 - 8.4 g/dL 8.3   Albumin 3.5 - 5.2 g/dL 4.1   BILIRUBIN TOTAL 0.1 - 1.0 mg/dL 0.5   AST 10 - 40 U/L 23   ALT 10 - 44 U/L 20   CRP 0.0 - 8.2 mg/L 1.7   (L): Data is abnormally low  (H): Data is abnormally high     Latest Reference Range & Units 11/18/22 09:44   WBC 3.90 - 12.70 K/uL 5.10   RBC 4.00 - 5.40 M/uL 4.63   Hemoglobin 12.0 - 16.0 g/dL 13.6   Hematocrit 37.0 - 48.5 % 43.1   MCV 82 - 98 fL 93   MCH 27.0 - 31.0 pg 29.4   MCHC 32.0 - 36.0 g/dL 31.6 (L)   RDW 11.5 - 14.5 % 13.9   Platelets 150 - 450 K/uL 223   MPV 9.2 - 12.9 fL 9.9   Gran % 38.0 - 73.0 % 40.6   Lymph % 18.0 - 48.0 % 41.0   Mono % 4.0 - 15.0 % 12.5   Eosinophil % 0.0 - 8.0 % 4.7   Basophil % 0.0 - 1.9 % 1.2   Immature Granulocytes 0.0 - 0.5 % 0.0   Gran # (ANC) 1.8 - 7.7 K/uL 2.1   Lymph # 1.0 - 4.8 K/uL 2.1   Mono # 0.3 - 1.0 K/uL 0.6   Eos # 0.0 - 0.5 K/uL 0.2   Baso # 0.00 - 0.20 K/uL 0.06   Immature Grans (Abs) 0.00 - 0.04 K/uL 0.00   nRBC 0 /100 WBC 0   Differential Method  Automated   Sed Rate 0 - 36 mm/Hr 14   Sodium 136 - 145 mmol/L 141   Potassium 3.5 - 5.1 mmol/L 5.4 (H)   Chloride 95 - 110 mmol/L 105   CO2 23 - 29 mmol/L 28   Anion Gap 8 - 16 mmol/L 8   BUN 8 - 23 mg/dL 17   Creatinine 0.5 - 1.4 mg/dL 0.7   eGFR >60 mL/min/1.73 m^2 >60   Glucose 70 - 110 mg/dL 92   Calcium 8.7 - 10.5 mg/dL 10.2   Alkaline Phosphatase 55 - 135 U/L 68   PROTEIN TOTAL 6.0 - 8.4 g/dL 7.8   Albumin 3.5 - 5.2 g/dL 4.0   BILIRUBIN TOTAL 0.1 - 1.0 mg/dL 0.5   AST 10 - 40 U/L 24   ALT 10 - 44 U/L 27   CRP 0.0 - 8.2 mg/L 0.9   (L): Data is abnormally low  (H): Data is abnormally high       Latest Reference Range & Units 08/29/23 09:43   WBC 3.90 - 12.70 K/uL 5.57   RBC 4.00 - 5.40 M/uL 4.70   Hemoglobin 12.0 - 16.0 g/dL 13.8   Hematocrit 37.0 - 48.5 % 43.3   MCV 82 - 98 fL 92   MCH 27.0 - 31.0 pg 29.4   MCHC 32.0 - 36.0 g/dL 31.9 (L)   RDW 11.5 - 14.5 % 13.8   Platelets 150 - 450 K/uL 287    MPV 9.2 - 12.9 fL 9.6   Gran % 38.0 - 73.0 % 47.2   Lymph % 18.0 - 48.0 % 36.8   Mono % 4.0 - 15.0 % 9.3   Eosinophil % 0.0 - 8.0 % 5.6   Basophil % 0.0 - 1.9 % 0.9   Immature Granulocytes 0.0 - 0.5 % 0.2   Gran # (ANC) 1.8 - 7.7 K/uL 2.6   Lymph # 1.0 - 4.8 K/uL 2.1   Mono # 0.3 - 1.0 K/uL 0.5   Eos # 0.0 - 0.5 K/uL 0.3   Baso # 0.00 - 0.20 K/uL 0.05   Immature Grans (Abs) 0.00 - 0.04 K/uL 0.01   nRBC 0 /100 WBC 0   Differential Method  Automated   (L): Data is abnormally low    Worsening plaque psoriasis and psoriatic arthritis.  Discontinue Humira, Arava and start Rinvoq.  Drug induced immunodeficiency due to use of immunosuppressive drugs. Monitor carefully for infections. Advised to get immediate medical care if any infection. Also advised strict adherence to age appropriate vaccinations and cancer screenings with PCP.  Hepatitis labs ordered for today.

## 2023-08-30 NOTE — PROGRESS NOTES
Stable values with elevated inflammation as expected. Please continue plan as discussed during the visit.

## 2024-02-06 ENCOUNTER — OFFICE VISIT (OUTPATIENT)
Dept: INTERNAL MEDICINE | Facility: CLINIC | Age: 69
End: 2024-02-06
Payer: MEDICARE

## 2024-02-06 ENCOUNTER — LAB VISIT (OUTPATIENT)
Dept: LAB | Facility: HOSPITAL | Age: 69
End: 2024-02-06
Attending: FAMILY MEDICINE
Payer: MEDICARE

## 2024-02-06 VITALS
WEIGHT: 159.38 LBS | BODY MASS INDEX: 25.01 KG/M2 | DIASTOLIC BLOOD PRESSURE: 80 MMHG | HEART RATE: 64 BPM | TEMPERATURE: 97 F | OXYGEN SATURATION: 100 % | SYSTOLIC BLOOD PRESSURE: 130 MMHG | HEIGHT: 67 IN

## 2024-02-06 DIAGNOSIS — M85.80 OSTEOPENIA WITH HIGH RISK OF FRACTURE: ICD-10-CM

## 2024-02-06 DIAGNOSIS — E03.9 ACQUIRED HYPOTHYROIDISM: ICD-10-CM

## 2024-02-06 DIAGNOSIS — D84.821 DRUG-INDUCED IMMUNODEFICIENCY: ICD-10-CM

## 2024-02-06 DIAGNOSIS — Z53.20 STATIN DECLINED: ICD-10-CM

## 2024-02-06 DIAGNOSIS — Z79.899 DRUG-INDUCED IMMUNODEFICIENCY: ICD-10-CM

## 2024-02-06 DIAGNOSIS — D84.9 IMMUNOCOMPROMISED: ICD-10-CM

## 2024-02-06 DIAGNOSIS — E78.00 HYPERCHOLESTEREMIA: ICD-10-CM

## 2024-02-06 DIAGNOSIS — Z00.00 VISIT FOR PREVENTIVE HEALTH EXAMINATION: Primary | ICD-10-CM

## 2024-02-06 DIAGNOSIS — L40.50 PSORIATIC ARTHRITIS: ICD-10-CM

## 2024-02-06 LAB
CHOLEST SERPL-MCNC: 283 MG/DL (ref 120–199)
CHOLEST/HDLC SERPL: 3.7 {RATIO} (ref 2–5)
HBV CORE AB SERPL QL IA: NORMAL
HBV SURFACE AB SER-ACNC: <3 MIU/ML
HBV SURFACE AB SER-ACNC: NORMAL M[IU]/ML
HBV SURFACE AG SERPL QL IA: NORMAL
HDLC SERPL-MCNC: 77 MG/DL (ref 40–75)
HDLC SERPL: 27.2 % (ref 20–50)
LDLC SERPL CALC-MCNC: 193.4 MG/DL (ref 63–159)
NONHDLC SERPL-MCNC: 206 MG/DL
TRIGL SERPL-MCNC: 63 MG/DL (ref 30–150)
TSH SERPL DL<=0.005 MIU/L-ACNC: 1.84 UIU/ML (ref 0.4–4)

## 2024-02-06 PROCEDURE — 36415 COLL VENOUS BLD VENIPUNCTURE: CPT | Performed by: INTERNAL MEDICINE

## 2024-02-06 PROCEDURE — 99397 PER PM REEVAL EST PAT 65+ YR: CPT | Mod: S$PBB,GZ,, | Performed by: PHYSICIAN ASSISTANT

## 2024-02-06 PROCEDURE — 99999 PR PBB SHADOW E&M-EST. PATIENT-LVL III: CPT | Mod: PBBFAC,,, | Performed by: PHYSICIAN ASSISTANT

## 2024-02-06 PROCEDURE — 87340 HEPATITIS B SURFACE AG IA: CPT | Performed by: INTERNAL MEDICINE

## 2024-02-06 PROCEDURE — 99213 OFFICE O/P EST LOW 20 MIN: CPT | Mod: PBBFAC | Performed by: PHYSICIAN ASSISTANT

## 2024-02-06 PROCEDURE — 80061 LIPID PANEL: CPT | Performed by: PHYSICIAN ASSISTANT

## 2024-02-06 PROCEDURE — 86706 HEP B SURFACE ANTIBODY: CPT | Performed by: INTERNAL MEDICINE

## 2024-02-06 PROCEDURE — 86704 HEP B CORE ANTIBODY TOTAL: CPT | Performed by: INTERNAL MEDICINE

## 2024-02-06 PROCEDURE — 84443 ASSAY THYROID STIM HORMONE: CPT | Performed by: PHYSICIAN ASSISTANT

## 2024-02-06 NOTE — PROGRESS NOTES
Subjective:      Patient ID: Maricarmen Becerril is a 68 y.o. female.    Chief Complaint: Follow-up    HPI  Here today for her annual exam.   Psoriasis: up to date with rheumJacqui Yin.   Lipids: declines starting on statin. Off fish oil for now but will restart.   TSH and lipid done today. Awaiting on results. ON 88mcg.   Vaccines: declines anymore covid vaccines or RSV. MIssed flu shot this year. Got the flu in December. Would like to get the shingles vaccine.   Mammogram due in October.   Dexa due next year.   Patient Active Problem List   Diagnosis    Psoriatic arthritis    Hypothyroidism    Psoriasis    Back pain    Lumbar and sacral osteoarthritis    Encounter for long-term (current) use of high-risk medication    Immunocompromised    Shoulder pain, left    Allergic rhinitis    Vitamin D deficiency    Osteopenia with high risk of fracture    Colon cancer screening    Sciatica of left side    Hypercholesteremia    Trochanteric bursitis of both hips    Bilateral hip pain    Neuropathy    Drug-induced immunodeficiency    Statin declined         Current Outpatient Medications:     levothyroxine (SYNTHROID) 88 MCG tablet, Take 1 tablet (88 mcg total) by mouth before breakfast., Disp: 30 tablet, Rfl: 11    upadacitinib (RINVOQ) 15 mg 24 hr tablet, Take 1 tablet (15 mg total) by mouth once daily., Disp: 30 tablet, Rfl: 11    omega-3 fatty acids/fish oil (FISH OIL-OMEGA-3 FATTY ACIDS) 300-1,000 mg capsule, Take by mouth once daily., Disp: , Rfl:     Review of Systems   Constitutional:  Negative for activity change, appetite change, chills, diaphoresis, fatigue, fever and unexpected weight change.   HENT: Negative.  Negative for congestion, hearing loss, postnasal drip, rhinorrhea, sore throat, trouble swallowing and voice change.    Eyes: Negative.  Negative for visual disturbance.   Respiratory: Negative.  Negative for cough, choking, chest tightness and shortness of breath.    Cardiovascular:  Negative for  "chest pain, palpitations and leg swelling.   Gastrointestinal:  Negative for abdominal distention, abdominal pain, blood in stool, constipation, diarrhea, nausea and vomiting.   Endocrine: Negative for cold intolerance, heat intolerance, polydipsia and polyuria.   Genitourinary: Negative.  Negative for difficulty urinating and frequency.   Musculoskeletal:  Negative for arthralgias, back pain, gait problem, joint swelling and myalgias.   Skin:  Negative for color change, pallor, rash and wound.   Neurological:  Negative for dizziness, tremors, weakness, light-headedness, numbness and headaches.   Hematological:  Negative for adenopathy.   Psychiatric/Behavioral:  Negative for behavioral problems, confusion, self-injury, sleep disturbance and suicidal ideas. The patient is not nervous/anxious.      Objective:   /80 (BP Location: Right arm, Patient Position: Sitting, BP Method: Large (Manual))   Pulse 64   Temp 97.3 °F (36.3 °C) (Tympanic)   Ht 5' 7" (1.702 m)   Wt 72.3 kg (159 lb 6.3 oz)   SpO2 100%   BMI 24.96 kg/m²     Physical Exam  Vitals and nursing note reviewed.   Constitutional:       General: She is not in acute distress.     Appearance: Normal appearance. She is well-developed. She is not ill-appearing, toxic-appearing or diaphoretic.   HENT:      Head: Normocephalic and atraumatic.   Cardiovascular:      Rate and Rhythm: Normal rate and regular rhythm.      Heart sounds: Normal heart sounds. No murmur heard.     No friction rub. No gallop.   Pulmonary:      Effort: Pulmonary effort is normal. No respiratory distress.      Breath sounds: Normal breath sounds. No wheezing or rales.   Musculoskeletal:         General: Normal range of motion.   Skin:     General: Skin is warm.      Capillary Refill: Capillary refill takes less than 2 seconds.      Findings: No rash.   Neurological:      Mental Status: She is alert and oriented to person, place, and time.      Motor: No weakness.      Gait: Gait " normal.   Psychiatric:         Mood and Affect: Mood normal.         Behavior: Behavior normal.         Thought Content: Thought content normal.         Judgment: Judgment normal.       Lab Results   Component Value Date    TSH 1.839 02/06/2024     Lab Visit on 02/06/2024   Component Date Value Ref Range Status    Cholesterol 02/06/2024 283 (H)  120 - 199 mg/dL Final    Comment: The National Cholesterol Education Program (NCEP) has set the  following guidelines (reference ranges) for Cholesterol:  Optimal.....................<200 mg/dL  Borderline High.............200-239 mg/dL  High........................> or = 240 mg/dL      Triglycerides 02/06/2024 63  30 - 150 mg/dL Final    Comment: The National Cholesterol Education Program (NCEP) has set the  following guidelines (reference values) for triglycerides:  Normal......................<150 mg/dL  Borderline High.............150-199 mg/dL  High........................200-499 mg/dL      HDL 02/06/2024 77 (H)  40 - 75 mg/dL Final    Comment: The National Cholesterol Education Program (NCEP) has set the  following guidelines (reference values) for HDL Cholesterol:  Low...............<40 mg/dL  Optimal...........>60 mg/dL      LDL Cholesterol 02/06/2024 193.4 (H)  63.0 - 159.0 mg/dL Final    Comment: The National Cholesterol Education Program (NCEP) has set the  following guidelines (reference values) for LDL Cholesterol:  Optimal.......................<130 mg/dL  Borderline High...............130-159 mg/dL  High..........................160-189 mg/dL  Very High.....................>190 mg/dL      HDL/Cholesterol Ratio 02/06/2024 27.2  20.0 - 50.0 % Final    Total Cholesterol/HDL Ratio 02/06/2024 3.7  2.0 - 5.0 Final    Non-HDL Cholesterol 02/06/2024 206  mg/dL Final    Comment: Risk category and Non-HDL cholesterol goals:  Coronary heart disease (CHD)or equivalent (10-year risk of CHD >20%):  Non-HDL cholesterol goal     <130 mg/dL  Two or more CHD risk factors and  10-year risk of CHD <= 20%:  Non-HDL cholesterol goal     <160 mg/dL  0 to 1 CHD risk factor:  Non-HDL cholesterol goal     <190 mg/dL      TSH 02/06/2024 1.839  0.400 - 4.000 uIU/mL Final    Hep B Core Total Ab 02/06/2024 Non-reactive  Non-reactive Final    Hep B S Ab 02/06/2024 <3.00  mIU/mL Final    Hep B S Ab 02/06/2024 Non-reactive   Final    Individual is considered not immune to HBV infection.    Hepatitis B Surface Ag 02/06/2024 Non-reactive  Non-reactive Final       Assessment:     1. Visit for preventive health examination    2. Psoriatic arthritis    3. Acquired hypothyroidism    4. Immunocompromised    5. Drug-induced immunodeficiency    6. Osteopenia with high risk of fracture    7. Statin declined    8. Hypercholesteremia      Plan:   Visit for preventive health examination    Psoriatic arthritis    Acquired hypothyroidism    Immunocompromised    Drug-induced immunodeficiency    Osteopenia with high risk of fracture    Statin declined    Hypercholesteremia    -will call/mychart patient with results of her labs completed today.   -shingles vaccine at [facility]   -cont follow up with rheum  -mammogram in October  -dexa next year.   -recommend statin. Pt still declines.    Follow up in about 7 months (around 9/6/2024), or if symptoms worsen or fail to improve.

## 2024-02-08 ENCOUNTER — PATIENT MESSAGE (OUTPATIENT)
Dept: INTERNAL MEDICINE | Facility: CLINIC | Age: 69
End: 2024-02-08
Payer: MEDICARE

## 2024-03-04 ENCOUNTER — OFFICE VISIT (OUTPATIENT)
Dept: INTERNAL MEDICINE | Facility: CLINIC | Age: 69
End: 2024-03-04
Payer: MEDICARE

## 2024-03-04 ENCOUNTER — TELEPHONE (OUTPATIENT)
Dept: INTERNAL MEDICINE | Facility: CLINIC | Age: 69
End: 2024-03-04
Payer: MEDICARE

## 2024-03-04 ENCOUNTER — TELEPHONE (OUTPATIENT)
Dept: INTERNAL MEDICINE | Facility: CLINIC | Age: 69
End: 2024-03-04

## 2024-03-04 ENCOUNTER — DOCUMENTATION ONLY (OUTPATIENT)
Dept: INTERNAL MEDICINE | Facility: CLINIC | Age: 69
End: 2024-03-04
Payer: MEDICARE

## 2024-03-04 VITALS
SYSTOLIC BLOOD PRESSURE: 126 MMHG | HEART RATE: 94 BPM | TEMPERATURE: 101 F | DIASTOLIC BLOOD PRESSURE: 80 MMHG | OXYGEN SATURATION: 95 % | BODY MASS INDEX: 24.22 KG/M2 | WEIGHT: 154.31 LBS | HEIGHT: 67 IN | RESPIRATION RATE: 16 BRPM

## 2024-03-04 DIAGNOSIS — D84.821 DRUG-INDUCED IMMUNODEFICIENCY: ICD-10-CM

## 2024-03-04 DIAGNOSIS — L40.50 PSORIATIC ARTHRITIS: ICD-10-CM

## 2024-03-04 DIAGNOSIS — J06.9 UPPER RESPIRATORY TRACT INFECTION, UNSPECIFIED TYPE: Primary | ICD-10-CM

## 2024-03-04 DIAGNOSIS — E03.9 ACQUIRED HYPOTHYROIDISM: ICD-10-CM

## 2024-03-04 DIAGNOSIS — Z79.899 DRUG-INDUCED IMMUNODEFICIENCY: ICD-10-CM

## 2024-03-04 LAB
CTP QC/QA: YES
CTP QC/QA: YES
POC MOLECULAR INFLUENZA A AGN: NEGATIVE
POC MOLECULAR INFLUENZA B AGN: NEGATIVE
SARS-COV-2 RDRP RESP QL NAA+PROBE: NEGATIVE

## 2024-03-04 PROCEDURE — 87502 INFLUENZA DNA AMP PROBE: CPT | Mod: PBBFAC | Performed by: PEDIATRICS

## 2024-03-04 PROCEDURE — 99999PBSHW: Mod: PBBFAC,,,

## 2024-03-04 PROCEDURE — 99214 OFFICE O/P EST MOD 30 MIN: CPT | Mod: S$PBB,,, | Performed by: PEDIATRICS

## 2024-03-04 PROCEDURE — 87635 SARS-COV-2 COVID-19 AMP PRB: CPT | Mod: PBBFAC | Performed by: PEDIATRICS

## 2024-03-04 PROCEDURE — 99999 PR PBB SHADOW E&M-EST. PATIENT-LVL III: CPT | Mod: PBBFAC,,, | Performed by: PEDIATRICS

## 2024-03-04 PROCEDURE — 99999PBSHW POCT INFLUENZA A/B MOLECULAR: Mod: PBBFAC,,,

## 2024-03-04 PROCEDURE — 99213 OFFICE O/P EST LOW 20 MIN: CPT | Mod: PBBFAC | Performed by: PEDIATRICS

## 2024-03-04 RX ORDER — METHYLPREDNISOLONE 4 MG/1
TABLET ORAL
Qty: 1 EACH | Refills: 0 | Status: SHIPPED | OUTPATIENT
Start: 2024-03-04 | End: 2024-04-24

## 2024-03-04 RX ORDER — PROMETHAZINE HYDROCHLORIDE AND DEXTROMETHORPHAN HYDROBROMIDE 6.25; 15 MG/5ML; MG/5ML
5 SYRUP ORAL EVERY 4 HOURS PRN
Qty: 240 ML | Refills: 0 | Status: SHIPPED | OUTPATIENT
Start: 2024-03-04 | End: 2024-03-14

## 2024-03-04 NOTE — TELEPHONE ENCOUNTER
----- Message from Maricruz Sharpe sent at 3/4/2024  4:54 PM CST -----  Type:  Same Day Appointment Request    Caller is requesting a same day appointment.  Caller declined first available appointment listed below.    Name of Caller:pt  When is the first available appointment?n/a  Symptoms:Upper respiratory  Best Call Back Number: [phone]  Additional Information:

## 2024-03-04 NOTE — TELEPHONE ENCOUNTER
----- Message from Kira Santos MA sent at 3/4/2024 10:25 AM CST -----  Contact: Patient  Hello, No just incase we need to test her. Thanks   ----- Message -----  From: Anderson Francisco LPN  Sent: 3/4/2024   9:46 AM CST  To: Bj Esteves LPN; Kira Santos MA    Patient wants to know if her appointment today can be virtual?  ----- Message -----  From: Adrian Borden  Sent: 3/4/2024   9:39 AM CST  To: Pato Martínez Jr Staff    Patient is calling requesting that her appt today at 1040 be made virtual. Reports that she is very weak and the coughing is very bad. Please call patient at .[phone].

## 2024-03-04 NOTE — TELEPHONE ENCOUNTER
Spoke with patient and informed her that Dr. Whyte would need to see her in the office in case she needed to be tested for anything. Patient verbalized understanding and scheduled a later appointment at 2:00 pm today.

## 2024-03-04 NOTE — PROGRESS NOTES
Subjective     Patient ID: Maricarmen Becerril is a 68 y.o. female.    Chief Complaint: Cough and Nasal Congestion    Maricarmen Becerril is a 68 y.o. female who presents today with sxs of cough starting last Tuesday.  had similar sxs prior. She is not flu vaccinated or up to date on covid vaccines. Pt states she does not feel that bad only coughing giving her trouble sleeping. Pt is immunosuppressed because psoriasis treatment.      Review of Systems   Constitutional:  Negative for chills and fever.   HENT:  Negative for nasal congestion and rhinorrhea.    Respiratory:  Positive for cough. Negative for shortness of breath.    Cardiovascular:  Negative for chest pain.   Gastrointestinal:  Negative for abdominal pain, blood in stool, change in bowel habit, constipation, diarrhea and nausea.   Endocrine: Negative for cold intolerance, heat intolerance, polydipsia, polyphagia and polyuria.   Genitourinary:  Negative for dysuria.   Neurological:  Negative for weakness.          Objective     Physical Exam  Constitutional:       General: She is not in acute distress.     Appearance: Normal appearance. She is normal weight. She is not ill-appearing or toxic-appearing.   Cardiovascular:      Rate and Rhythm: Normal rate and regular rhythm.      Pulses: Normal pulses.      Heart sounds: Normal heart sounds. No murmur heard.     No gallop.   Pulmonary:      Effort: Pulmonary effort is normal. No respiratory distress.      Breath sounds: Normal breath sounds. No stridor. No wheezing, rhonchi or rales.   Chest:      Chest wall: No tenderness.   Abdominal:      General: There is no distension.      Palpations: There is no mass.      Tenderness: There is no abdominal tenderness.      Hernia: No hernia is present.   Neurological:      General: No focal deficit present.      Mental Status: She is alert and oriented to person, place, and time. Mental status is at baseline.      Cranial Nerves: No cranial nerve  deficit.      Motor: No weakness.      Gait: Gait normal.   Psychiatric:         Mood and Affect: Mood normal.         Behavior: Behavior normal.         Thought Content: Thought content normal.         Judgment: Judgment normal.            Assessment and Plan     1. Upper respiratory tract infection, unspecified type  -     POCT COVID-19 Rapid Screening  -     POCT Influenza A/B Molecular    2. Drug-induced immunodeficiency    3. Psoriatic arthritis    4. Acquired hypothyroidism  Overview:  SECONDARY      Other orders  -     promethazine-dextromethorphan (PROMETHAZINE-DM) 6.25-15 mg/5 mL Syrp; Take 5 mLs by mouth every 4 (four) hours as needed.  Dispense: 240 mL; Refill: 0  -     methylPREDNISolone (MEDROL DOSEPACK) 4 mg tablet; use as directed  Dispense: 1 each; Refill: 0      Pt is flu/covid negative at time of exam. Suspect other viral upper respiratory infection. Discussed symptomatic treatment and infection control. Prednisone dose pack and phenergan and DM given. Vaccines recommended.          No follow-ups on file.

## 2024-04-24 ENCOUNTER — OFFICE VISIT (OUTPATIENT)
Dept: RHEUMATOLOGY | Facility: CLINIC | Age: 69
End: 2024-04-24
Payer: COMMERCIAL

## 2024-04-24 ENCOUNTER — LAB VISIT (OUTPATIENT)
Dept: LAB | Facility: HOSPITAL | Age: 69
End: 2024-04-24
Attending: INTERNAL MEDICINE
Payer: MEDICARE

## 2024-04-24 VITALS
HEART RATE: 65 BPM | HEIGHT: 67 IN | DIASTOLIC BLOOD PRESSURE: 76 MMHG | SYSTOLIC BLOOD PRESSURE: 111 MMHG | BODY MASS INDEX: 24.67 KG/M2 | WEIGHT: 157.19 LBS

## 2024-04-24 DIAGNOSIS — L40.50 PSORIATIC ARTHRITIS: Primary | ICD-10-CM

## 2024-04-24 DIAGNOSIS — D84.821 DRUG-INDUCED IMMUNODEFICIENCY: ICD-10-CM

## 2024-04-24 DIAGNOSIS — D84.9 IMMUNOCOMPROMISED: ICD-10-CM

## 2024-04-24 DIAGNOSIS — Z79.899 DRUG-INDUCED IMMUNODEFICIENCY: ICD-10-CM

## 2024-04-24 DIAGNOSIS — M47.817 LUMBAR AND SACRAL OSTEOARTHRITIS: ICD-10-CM

## 2024-04-24 DIAGNOSIS — L40.50 PSORIATIC ARTHRITIS: ICD-10-CM

## 2024-04-24 DIAGNOSIS — G62.9 NEUROPATHY: ICD-10-CM

## 2024-04-24 LAB
ALBUMIN SERPL BCP-MCNC: 4.1 G/DL (ref 3.5–5.2)
ALP SERPL-CCNC: 62 U/L (ref 55–135)
ALT SERPL W/O P-5'-P-CCNC: 19 U/L (ref 10–44)
ANION GAP SERPL CALC-SCNC: 11 MMOL/L (ref 8–16)
AST SERPL-CCNC: 22 U/L (ref 10–40)
BASOPHILS # BLD AUTO: 0.03 K/UL (ref 0–0.2)
BASOPHILS NFR BLD: 0.5 % (ref 0–1.9)
BILIRUB SERPL-MCNC: 0.6 MG/DL (ref 0.1–1)
BUN SERPL-MCNC: 14 MG/DL (ref 8–23)
CALCIUM SERPL-MCNC: 10.3 MG/DL (ref 8.7–10.5)
CHLORIDE SERPL-SCNC: 102 MMOL/L (ref 95–110)
CO2 SERPL-SCNC: 26 MMOL/L (ref 23–29)
CREAT SERPL-MCNC: 0.8 MG/DL (ref 0.5–1.4)
CRP SERPL-MCNC: 2.6 MG/L (ref 0–8.2)
DIFFERENTIAL METHOD BLD: ABNORMAL
EOSINOPHIL # BLD AUTO: 0.1 K/UL (ref 0–0.5)
EOSINOPHIL NFR BLD: 0.9 % (ref 0–8)
ERYTHROCYTE [DISTWIDTH] IN BLOOD BY AUTOMATED COUNT: 13.5 % (ref 11.5–14.5)
ERYTHROCYTE [SEDIMENTATION RATE] IN BLOOD BY PHOTOMETRIC METHOD: 14 MM/HR (ref 0–36)
EST. GFR  (NO RACE VARIABLE): >60 ML/MIN/1.73 M^2
GLUCOSE SERPL-MCNC: 90 MG/DL (ref 70–110)
HCT VFR BLD AUTO: 39.6 % (ref 37–48.5)
HGB BLD-MCNC: 13.1 G/DL (ref 12–16)
IMM GRANULOCYTES # BLD AUTO: 0.01 K/UL (ref 0–0.04)
IMM GRANULOCYTES NFR BLD AUTO: 0.2 % (ref 0–0.5)
LYMPHOCYTES # BLD AUTO: 1.4 K/UL (ref 1–4.8)
LYMPHOCYTES NFR BLD: 25.3 % (ref 18–48)
MCH RBC QN AUTO: 31.2 PG (ref 27–31)
MCHC RBC AUTO-ENTMCNC: 33.1 G/DL (ref 32–36)
MCV RBC AUTO: 94 FL (ref 82–98)
MONOCYTES # BLD AUTO: 0.6 K/UL (ref 0.3–1)
MONOCYTES NFR BLD: 10.8 % (ref 4–15)
NEUTROPHILS # BLD AUTO: 3.5 K/UL (ref 1.8–7.7)
NEUTROPHILS NFR BLD: 62.3 % (ref 38–73)
NRBC BLD-RTO: 0 /100 WBC
PLATELET # BLD AUTO: 300 K/UL (ref 150–450)
PMV BLD AUTO: 9.3 FL (ref 9.2–12.9)
POTASSIUM SERPL-SCNC: 4.9 MMOL/L (ref 3.5–5.1)
PROT SERPL-MCNC: 8.3 G/DL (ref 6–8.4)
RBC # BLD AUTO: 4.2 M/UL (ref 4–5.4)
SODIUM SERPL-SCNC: 139 MMOL/L (ref 136–145)
WBC # BLD AUTO: 5.57 K/UL (ref 3.9–12.7)

## 2024-04-24 PROCEDURE — 86140 C-REACTIVE PROTEIN: CPT | Performed by: INTERNAL MEDICINE

## 2024-04-24 PROCEDURE — 85025 COMPLETE CBC W/AUTO DIFF WBC: CPT | Performed by: INTERNAL MEDICINE

## 2024-04-24 PROCEDURE — 3008F BODY MASS INDEX DOCD: CPT | Mod: CPTII,S$GLB,, | Performed by: INTERNAL MEDICINE

## 2024-04-24 PROCEDURE — 36415 COLL VENOUS BLD VENIPUNCTURE: CPT | Performed by: INTERNAL MEDICINE

## 2024-04-24 PROCEDURE — 1126F AMNT PAIN NOTED NONE PRSNT: CPT | Mod: CPTII,S$GLB,, | Performed by: INTERNAL MEDICINE

## 2024-04-24 PROCEDURE — 80053 COMPREHEN METABOLIC PANEL: CPT | Performed by: INTERNAL MEDICINE

## 2024-04-24 PROCEDURE — 85652 RBC SED RATE AUTOMATED: CPT | Performed by: INTERNAL MEDICINE

## 2024-04-24 PROCEDURE — 1160F RVW MEDS BY RX/DR IN RCRD: CPT | Mod: CPTII,S$GLB,, | Performed by: INTERNAL MEDICINE

## 2024-04-24 PROCEDURE — 1159F MED LIST DOCD IN RCRD: CPT | Mod: CPTII,S$GLB,, | Performed by: INTERNAL MEDICINE

## 2024-04-24 PROCEDURE — 99999 PR PBB SHADOW E&M-EST. PATIENT-LVL III: CPT | Mod: PBBFAC,,, | Performed by: INTERNAL MEDICINE

## 2024-04-24 PROCEDURE — 3074F SYST BP LT 130 MM HG: CPT | Mod: CPTII,S$GLB,, | Performed by: INTERNAL MEDICINE

## 2024-04-24 PROCEDURE — 3078F DIAST BP <80 MM HG: CPT | Mod: CPTII,S$GLB,, | Performed by: INTERNAL MEDICINE

## 2024-04-24 PROCEDURE — 99214 OFFICE O/P EST MOD 30 MIN: CPT | Mod: S$GLB,,, | Performed by: INTERNAL MEDICINE

## 2024-04-24 RX ORDER — FERROUS SULFATE, DRIED 160(50) MG
1 TABLET, EXTENDED RELEASE ORAL 2 TIMES DAILY WITH MEALS
COMMUNITY

## 2024-04-24 NOTE — PROGRESS NOTES
RHEUMATOLOGY CLINIC FOLLOW UP VISIT  Chief complaints, HPI, ROS, EXAM, Assessment & Plans:-  Maricarmen Ho a 68 y.o. pleasant female comes in for follow-up for plaque psoriasis and psoriatic arthritis.  She complains of mild flare of PsO but not PsA. Took medrol dose pack for RSV infection. Resolved. Rheumatological review of system negative otherwise.  Exam shows tenderness of bilateral shoulder and hip joints.  Mild plaque psoriasis behind the earlobe.    1. Psoriatic arthritis    2. Drug-induced immunodeficiency    3. Neuropathy    4. Lumbar and sacral osteoarthritis      Problem List Items Addressed This Visit       Drug-induced immunodeficiency    Lumbar and sacral osteoarthritis    Neuropathy    Psoriatic arthritis - Primary      Latest Reference Range & Units 08/29/23 09:43   WBC 3.90 - 12.70 K/uL 5.57   RBC 4.00 - 5.40 M/uL 4.70   Hemoglobin 12.0 - 16.0 g/dL 13.8   Hematocrit 37.0 - 48.5 % 43.3   MCV 82 - 98 fL 92   MCH 27.0 - 31.0 pg 29.4   MCHC 32.0 - 36.0 g/dL 31.9 (L)   RDW 11.5 - 14.5 % 13.8   Platelets 150 - 450 K/uL 287   MPV 9.2 - 12.9 fL 9.6   Gran % 38.0 - 73.0 % 47.2   Lymph % 18.0 - 48.0 % 36.8   Mono % 4.0 - 15.0 % 9.3   Eosinophil % 0.0 - 8.0 % 5.6   Basophil % 0.0 - 1.9 % 0.9   Immature Granulocytes 0.0 - 0.5 % 0.2   Gran # (ANC) 1.8 - 7.7 K/uL 2.6   Lymph # 1.0 - 4.8 K/uL 2.1   Mono # 0.3 - 1.0 K/uL 0.5   Eos # 0.0 - 0.5 K/uL 0.3   Baso # 0.00 - 0.20 K/uL 0.05   Immature Grans (Abs) 0.00 - 0.04 K/uL 0.01   nRBC 0 /100 WBC 0   Differential Method  Automated   Sodium 136 - 145 mmol/L 141   Potassium 3.5 - 5.1 mmol/L 5.3 (H)   Chloride 95 - 110 mmol/L 103   CO2 23 - 29 mmol/L 27   Anion Gap 8 - 16 mmol/L 11   BUN 8 - 23 mg/dL 20   Creatinine 0.5 - 1.4 mg/dL 0.7   eGFR >60 mL/min/1.73 m^2 >60   Glucose 70 - 110 mg/dL 92   Calcium 8.7 - 10.5 mg/dL 10.5   Alkaline Phosphatase 55 - 135 U/L 82   PROTEIN TOTAL 6.0 - 8.4 g/dL 8.3   Albumin 3.5 - 5.2 g/dL 4.1   BILIRUBIN TOTAL 0.1 - 1.0  mg/dL 0.5   AST 10 - 40 U/L 23   ALT 10 - 44 U/L 20   CRP 0.0 - 8.2 mg/L 1.7   (L): Data is abnormally low  (H): Data is abnormally high     Latest Reference Range & Units 11/18/22 09:44   WBC 3.90 - 12.70 K/uL 5.10   RBC 4.00 - 5.40 M/uL 4.63   Hemoglobin 12.0 - 16.0 g/dL 13.6   Hematocrit 37.0 - 48.5 % 43.1   MCV 82 - 98 fL 93   MCH 27.0 - 31.0 pg 29.4   MCHC 32.0 - 36.0 g/dL 31.6 (L)   RDW 11.5 - 14.5 % 13.9   Platelets 150 - 450 K/uL 223   MPV 9.2 - 12.9 fL 9.9   Gran % 38.0 - 73.0 % 40.6   Lymph % 18.0 - 48.0 % 41.0   Mono % 4.0 - 15.0 % 12.5   Eosinophil % 0.0 - 8.0 % 4.7   Basophil % 0.0 - 1.9 % 1.2   Immature Granulocytes 0.0 - 0.5 % 0.0   Gran # (ANC) 1.8 - 7.7 K/uL 2.1   Lymph # 1.0 - 4.8 K/uL 2.1   Mono # 0.3 - 1.0 K/uL 0.6   Eos # 0.0 - 0.5 K/uL 0.2   Baso # 0.00 - 0.20 K/uL 0.06   Immature Grans (Abs) 0.00 - 0.04 K/uL 0.00   nRBC 0 /100 WBC 0   Differential Method  Automated   Sed Rate 0 - 36 mm/Hr 14   Sodium 136 - 145 mmol/L 141   Potassium 3.5 - 5.1 mmol/L 5.4 (H)   Chloride 95 - 110 mmol/L 105   CO2 23 - 29 mmol/L 28   Anion Gap 8 - 16 mmol/L 8   BUN 8 - 23 mg/dL 17   Creatinine 0.5 - 1.4 mg/dL 0.7   eGFR >60 mL/min/1.73 m^2 >60   Glucose 70 - 110 mg/dL 92   Calcium 8.7 - 10.5 mg/dL 10.2   Alkaline Phosphatase 55 - 135 U/L 68   PROTEIN TOTAL 6.0 - 8.4 g/dL 7.8   Albumin 3.5 - 5.2 g/dL 4.0   BILIRUBIN TOTAL 0.1 - 1.0 mg/dL 0.5   AST 10 - 40 U/L 24   ALT 10 - 44 U/L 27   CRP 0.0 - 8.2 mg/L 0.9   (L): Data is abnormally low  (H): Data is abnormally high       Latest Reference Range & Units 08/29/23 09:43   WBC 3.90 - 12.70 K/uL 5.57   RBC 4.00 - 5.40 M/uL 4.70   Hemoglobin 12.0 - 16.0 g/dL 13.8   Hematocrit 37.0 - 48.5 % 43.3   MCV 82 - 98 fL 92   MCH 27.0 - 31.0 pg 29.4   MCHC 32.0 - 36.0 g/dL 31.9 (L)   RDW 11.5 - 14.5 % 13.8   Platelets 150 - 450 K/uL 287   MPV 9.2 - 12.9 fL 9.6   Gran % 38.0 - 73.0 % 47.2   Lymph % 18.0 - 48.0 % 36.8   Mono % 4.0 - 15.0 % 9.3   Eosinophil % 0.0 - 8.0 % 5.6    Basophil % 0.0 - 1.9 % 0.9   Immature Granulocytes 0.0 - 0.5 % 0.2   Gran # (ANC) 1.8 - 7.7 K/uL 2.6   Lymph # 1.0 - 4.8 K/uL 2.1   Mono # 0.3 - 1.0 K/uL 0.5   Eos # 0.0 - 0.5 K/uL 0.3   Baso # 0.00 - 0.20 K/uL 0.05   Immature Grans (Abs) 0.00 - 0.04 K/uL 0.01   nRBC 0 /100 WBC 0   Differential Method  Automated   (L): Data is abnormally low    Mild flare of plaque psoriasis after discontinuation of medrol dose pack given for the recent RSV infection. Stable psoriatic arthritis.  Continue rinvoq.  Drug induced immunodeficiency due to use of immunosuppressive drugs. Monitor carefully for infections. Advised to get immediate medical care if any infection. Also advised strict adherence to age appropriate vaccinations and cancer screenings with PCP.

## 2024-07-11 NOTE — PROGRESS NOTES
Your lab results show no significant abnormalities.  Please continue plan as discussed during the visit. 
Your lab results show no significant abnormalities.  Please continue plan as discussed during the visit. 
  Constitutional:  Negative for chills and fever.   HENT: Negative.     Eyes:  Negative for visual disturbance.   Respiratory:  Negative for shortness of breath and wheezing.    Cardiovascular:  Negative for chest pain and palpitations.   Gastrointestinal: Negative.    Skin:         Rash around mouth and by upper right eyelid   Neurological:  Positive for numbness (and tingling in arms and legs).   Hematological: Negative.    Psychiatric/Behavioral: Negative.  Negative for decreased concentration.          Physical Exam  Vitals and nursing note reviewed.   Constitutional:       Appearance: Normal appearance. She is well-developed.   HENT:      Head: Normocephalic and atraumatic.      Right Ear: Tympanic membrane and external ear normal.      Left Ear: Tympanic membrane and external ear normal.      Nose: Nose normal.      Mouth/Throat:      Pharynx: No oropharyngeal exudate or posterior oropharyngeal erythema.   Eyes:      Conjunctiva/sclera: Conjunctivae normal.   Cardiovascular:      Rate and Rhythm: Normal rate and regular rhythm.      Heart sounds: Normal heart sounds. No murmur heard.  Pulmonary:      Effort: Pulmonary effort is normal. No respiratory distress.      Breath sounds: Normal breath sounds. No wheezing or rales.   Abdominal:      General: Bowel sounds are normal. There is no distension.      Palpations: Abdomen is soft.      Tenderness: There is no abdominal tenderness. There is no rebound.   Musculoskeletal:         General: Normal range of motion.      Cervical back: Normal range of motion and neck supple.   Lymphadenopathy:      Cervical: No cervical adenopathy.   Skin:     General: Skin is warm and dry.      Findings: Rash (small little red spots by corner of mouth on the right side) present.   Neurological:      General: No focal deficit present.      Mental Status: She is alert and oriented to person, place, and time.      Deep Tendon Reflexes: Reflexes are normal and symmetric.   Psychiatric:

## 2024-07-16 ENCOUNTER — TELEPHONE (OUTPATIENT)
Dept: INTERNAL MEDICINE | Facility: CLINIC | Age: 69
End: 2024-07-16
Payer: MEDICARE

## 2024-07-16 DIAGNOSIS — E03.9 HYPOTHYROIDISM, UNSPECIFIED TYPE: ICD-10-CM

## 2024-07-16 RX ORDER — LEVOTHYROXINE SODIUM 88 UG/1
88 TABLET ORAL
Qty: 90 TABLET | Refills: 3 | Status: SHIPPED | OUTPATIENT
Start: 2024-07-16 | End: 2025-07-16

## 2024-07-16 NOTE — TELEPHONE ENCOUNTER
----- Message from Minoo Lu MA sent at 2024  1:32 PM CDT -----  Contact: Mobile  [phone]    ----- Message -----  From: Alicia Estrella  Sent: 2024   9:23 AM CDT  To: Wander GOMEZ Staff    Requesting an RX refill or new RX.    Is this a refill or new RX: Refill    RX name and strength levothyroxine (SYNTHROID) 88 MCG tablet (    Is this a 30 day or 90 day RX: 90    Pharmacy name and phone #   [facility] - [place], [place] - [address]  [facility] [address]  Phone: [phone] Fax: [phone]      The doctors have asked that we provide their patients with the following 2 reminders -- prescription refills can take up to 72 hours, and a friendly reminder that in the future you can use your [facility] account to request refills:

## 2024-08-15 DIAGNOSIS — L40.9 PSORIASIS: ICD-10-CM

## 2024-08-15 DIAGNOSIS — L40.50 PSORIATIC ARTHRITIS: ICD-10-CM

## 2024-08-15 RX ORDER — UPADACITINIB 15 MG/1
15 TABLET, EXTENDED RELEASE ORAL
Qty: 30 TABLET | Refills: 11 | Status: SHIPPED | OUTPATIENT
Start: 2024-08-15

## 2024-09-09 ENCOUNTER — OFFICE VISIT (OUTPATIENT)
Dept: INTERNAL MEDICINE | Facility: CLINIC | Age: 69
End: 2024-09-09
Payer: COMMERCIAL

## 2024-09-09 VITALS
HEIGHT: 67 IN | OXYGEN SATURATION: 97 % | DIASTOLIC BLOOD PRESSURE: 80 MMHG | HEART RATE: 57 BPM | WEIGHT: 164.25 LBS | BODY MASS INDEX: 25.78 KG/M2 | SYSTOLIC BLOOD PRESSURE: 130 MMHG

## 2024-09-09 DIAGNOSIS — Z13.1 SCREENING FOR DIABETES MELLITUS: ICD-10-CM

## 2024-09-09 DIAGNOSIS — E78.00 HYPERCHOLESTEREMIA: ICD-10-CM

## 2024-09-09 DIAGNOSIS — E03.9 ACQUIRED HYPOTHYROIDISM: Primary | ICD-10-CM

## 2024-09-09 DIAGNOSIS — D84.9 IMMUNOCOMPROMISED: ICD-10-CM

## 2024-09-09 PROCEDURE — 3079F DIAST BP 80-89 MM HG: CPT | Mod: CPTII,S$GLB,, | Performed by: FAMILY MEDICINE

## 2024-09-09 PROCEDURE — 3075F SYST BP GE 130 - 139MM HG: CPT | Mod: CPTII,S$GLB,, | Performed by: FAMILY MEDICINE

## 2024-09-09 PROCEDURE — 3008F BODY MASS INDEX DOCD: CPT | Mod: CPTII,S$GLB,, | Performed by: FAMILY MEDICINE

## 2024-09-09 PROCEDURE — 99999 PR PBB SHADOW E&M-EST. PATIENT-LVL III: CPT | Mod: PBBFAC,,, | Performed by: FAMILY MEDICINE

## 2024-09-09 PROCEDURE — 99397 PER PM REEVAL EST PAT 65+ YR: CPT | Mod: S$GLB,,, | Performed by: FAMILY MEDICINE

## 2024-09-09 PROCEDURE — 1159F MED LIST DOCD IN RCRD: CPT | Mod: CPTII,S$GLB,, | Performed by: FAMILY MEDICINE

## 2024-09-09 PROCEDURE — 1126F AMNT PAIN NOTED NONE PRSNT: CPT | Mod: CPTII,S$GLB,, | Performed by: FAMILY MEDICINE

## 2024-09-09 PROCEDURE — 3288F FALL RISK ASSESSMENT DOCD: CPT | Mod: CPTII,S$GLB,, | Performed by: FAMILY MEDICINE

## 2024-09-09 PROCEDURE — 1101F PT FALLS ASSESS-DOCD LE1/YR: CPT | Mod: CPTII,S$GLB,, | Performed by: FAMILY MEDICINE

## 2024-09-09 NOTE — PATIENT INSTRUCTIONS
.Shingrix new shingles vaccine  via a pharmacy  Please obtain the RSV vaccine via your pharmacy  Prevnar 20 vaccine  via a pharmacy (new pneumonia vaccine)

## 2024-09-12 ENCOUNTER — TELEPHONE (OUTPATIENT)
Dept: RHEUMATOLOGY | Facility: CLINIC | Age: 69
End: 2024-09-12
Payer: MEDICARE

## 2024-09-30 ENCOUNTER — PATIENT MESSAGE (OUTPATIENT)
Dept: RHEUMATOLOGY | Facility: CLINIC | Age: 69
End: 2024-09-30
Payer: MEDICARE

## 2024-10-31 ENCOUNTER — LAB VISIT (OUTPATIENT)
Dept: LAB | Facility: HOSPITAL | Age: 69
End: 2024-10-31
Attending: INTERNAL MEDICINE
Payer: COMMERCIAL

## 2024-10-31 ENCOUNTER — OFFICE VISIT (OUTPATIENT)
Dept: RHEUMATOLOGY | Facility: CLINIC | Age: 69
End: 2024-10-31
Payer: COMMERCIAL

## 2024-10-31 VITALS
SYSTOLIC BLOOD PRESSURE: 148 MMHG | WEIGHT: 163.38 LBS | DIASTOLIC BLOOD PRESSURE: 84 MMHG | HEIGHT: 67 IN | BODY MASS INDEX: 25.64 KG/M2 | HEART RATE: 61 BPM

## 2024-10-31 DIAGNOSIS — L40.50 PSORIATIC ARTHRITIS: ICD-10-CM

## 2024-10-31 DIAGNOSIS — D84.821 DRUG-INDUCED IMMUNODEFICIENCY: ICD-10-CM

## 2024-10-31 DIAGNOSIS — Z79.899 DRUG-INDUCED IMMUNODEFICIENCY: ICD-10-CM

## 2024-10-31 DIAGNOSIS — L40.0 PLAQUE PSORIASIS: ICD-10-CM

## 2024-10-31 DIAGNOSIS — L40.0 PLAQUE PSORIASIS: Primary | ICD-10-CM

## 2024-10-31 DIAGNOSIS — L40.9 PSORIASIS: ICD-10-CM

## 2024-10-31 LAB
ALBUMIN SERPL BCP-MCNC: 4.1 G/DL (ref 3.5–5.2)
ALP SERPL-CCNC: 70 U/L (ref 40–150)
ALT SERPL W/O P-5'-P-CCNC: 19 U/L (ref 10–44)
ANION GAP SERPL CALC-SCNC: 9 MMOL/L (ref 8–16)
AST SERPL-CCNC: 26 U/L (ref 10–40)
BASOPHILS # BLD AUTO: 0.02 K/UL (ref 0–0.2)
BASOPHILS NFR BLD: 0.5 % (ref 0–1.9)
BILIRUB SERPL-MCNC: 0.5 MG/DL (ref 0.1–1)
BUN SERPL-MCNC: 13 MG/DL (ref 8–23)
CALCIUM SERPL-MCNC: 10.9 MG/DL (ref 8.7–10.5)
CHLORIDE SERPL-SCNC: 105 MMOL/L (ref 95–110)
CO2 SERPL-SCNC: 27 MMOL/L (ref 23–29)
CREAT SERPL-MCNC: 0.9 MG/DL (ref 0.5–1.4)
CRP SERPL-MCNC: 0.2 MG/L (ref 0–8.2)
DIFFERENTIAL METHOD BLD: ABNORMAL
EOSINOPHIL # BLD AUTO: 0.1 K/UL (ref 0–0.5)
EOSINOPHIL NFR BLD: 1.9 % (ref 0–8)
ERYTHROCYTE [DISTWIDTH] IN BLOOD BY AUTOMATED COUNT: 14.6 % (ref 11.5–14.5)
ERYTHROCYTE [SEDIMENTATION RATE] IN BLOOD BY WESTERGREN METHOD: 26 MM/HR (ref 0–20)
EST. GFR  (NO RACE VARIABLE): >60 ML/MIN/1.73 M^2
GLUCOSE SERPL-MCNC: 92 MG/DL (ref 70–110)
HBV CORE AB SERPL QL IA: NORMAL
HBV SURFACE AB SER-ACNC: <3 MIU/ML
HBV SURFACE AB SER-ACNC: NORMAL M[IU]/ML
HBV SURFACE AG SERPL QL IA: NORMAL
HCT VFR BLD AUTO: 41.9 % (ref 37–48.5)
HGB BLD-MCNC: 13.4 G/DL (ref 12–16)
IMM GRANULOCYTES # BLD AUTO: 0.02 K/UL (ref 0–0.04)
IMM GRANULOCYTES NFR BLD AUTO: 0.5 % (ref 0–0.5)
LYMPHOCYTES # BLD AUTO: 1.3 K/UL (ref 1–4.8)
LYMPHOCYTES NFR BLD: 34.3 % (ref 18–48)
MCH RBC QN AUTO: 30.2 PG (ref 27–31)
MCHC RBC AUTO-ENTMCNC: 32 G/DL (ref 32–36)
MCV RBC AUTO: 94 FL (ref 82–98)
MONOCYTES # BLD AUTO: 0.4 K/UL (ref 0.3–1)
MONOCYTES NFR BLD: 9.5 % (ref 4–15)
NEUTROPHILS # BLD AUTO: 2 K/UL (ref 1.8–7.7)
NEUTROPHILS NFR BLD: 53.3 % (ref 38–73)
NRBC BLD-RTO: 0 /100 WBC
PLATELET # BLD AUTO: 319 K/UL (ref 150–450)
PMV BLD AUTO: 9.2 FL (ref 9.2–12.9)
POTASSIUM SERPL-SCNC: 5.5 MMOL/L (ref 3.5–5.1)
PROT SERPL-MCNC: 8.1 G/DL (ref 6–8.4)
RBC # BLD AUTO: 4.44 M/UL (ref 4–5.4)
SODIUM SERPL-SCNC: 141 MMOL/L (ref 136–145)
WBC # BLD AUTO: 3.7 K/UL (ref 3.9–12.7)

## 2024-10-31 PROCEDURE — 3077F SYST BP >= 140 MM HG: CPT | Mod: CPTII,S$GLB,, | Performed by: INTERNAL MEDICINE

## 2024-10-31 PROCEDURE — 1126F AMNT PAIN NOTED NONE PRSNT: CPT | Mod: CPTII,S$GLB,, | Performed by: INTERNAL MEDICINE

## 2024-10-31 PROCEDURE — 1160F RVW MEDS BY RX/DR IN RCRD: CPT | Mod: CPTII,S$GLB,, | Performed by: INTERNAL MEDICINE

## 2024-10-31 PROCEDURE — 3288F FALL RISK ASSESSMENT DOCD: CPT | Mod: CPTII,S$GLB,, | Performed by: INTERNAL MEDICINE

## 2024-10-31 PROCEDURE — 80053 COMPREHEN METABOLIC PANEL: CPT | Performed by: INTERNAL MEDICINE

## 2024-10-31 PROCEDURE — G2211 COMPLEX E/M VISIT ADD ON: HCPCS | Mod: S$GLB,,, | Performed by: INTERNAL MEDICINE

## 2024-10-31 PROCEDURE — 99215 OFFICE O/P EST HI 40 MIN: CPT | Mod: S$GLB,,, | Performed by: INTERNAL MEDICINE

## 2024-10-31 PROCEDURE — 1101F PT FALLS ASSESS-DOCD LE1/YR: CPT | Mod: CPTII,S$GLB,, | Performed by: INTERNAL MEDICINE

## 2024-10-31 PROCEDURE — 1159F MED LIST DOCD IN RCRD: CPT | Mod: CPTII,S$GLB,, | Performed by: INTERNAL MEDICINE

## 2024-10-31 PROCEDURE — 86140 C-REACTIVE PROTEIN: CPT | Performed by: INTERNAL MEDICINE

## 2024-10-31 PROCEDURE — 86706 HEP B SURFACE ANTIBODY: CPT | Performed by: INTERNAL MEDICINE

## 2024-10-31 PROCEDURE — 86704 HEP B CORE ANTIBODY TOTAL: CPT | Performed by: INTERNAL MEDICINE

## 2024-10-31 PROCEDURE — 99999 PR PBB SHADOW E&M-EST. PATIENT-LVL III: CPT | Mod: PBBFAC,,, | Performed by: INTERNAL MEDICINE

## 2024-10-31 PROCEDURE — 86480 TB TEST CELL IMMUN MEASURE: CPT | Performed by: INTERNAL MEDICINE

## 2024-10-31 PROCEDURE — 85025 COMPLETE CBC W/AUTO DIFF WBC: CPT | Performed by: INTERNAL MEDICINE

## 2024-10-31 PROCEDURE — 36415 COLL VENOUS BLD VENIPUNCTURE: CPT | Performed by: INTERNAL MEDICINE

## 2024-10-31 PROCEDURE — 87340 HEPATITIS B SURFACE AG IA: CPT | Performed by: INTERNAL MEDICINE

## 2024-10-31 PROCEDURE — 3079F DIAST BP 80-89 MM HG: CPT | Mod: CPTII,S$GLB,, | Performed by: INTERNAL MEDICINE

## 2024-10-31 PROCEDURE — 85651 RBC SED RATE NONAUTOMATED: CPT | Performed by: INTERNAL MEDICINE

## 2024-10-31 PROCEDURE — 3008F BODY MASS INDEX DOCD: CPT | Mod: CPTII,S$GLB,, | Performed by: INTERNAL MEDICINE

## 2024-10-31 RX ORDER — SECUKINUMAB 150 MG/ML
INJECTION SUBCUTANEOUS
Qty: 1 ML | Refills: 11 | Status: SHIPPED | OUTPATIENT
Start: 2024-10-31

## 2024-10-31 RX ORDER — SECUKINUMAB 150 MG/ML
INJECTION SUBCUTANEOUS
Qty: 5 EACH | Refills: 0 | Status: SHIPPED | OUTPATIENT
Start: 2024-10-31

## 2024-11-01 LAB
GAMMA INTERFERON BACKGROUND BLD IA-ACNC: 0.04 IU/ML
M TB IFN-G CD4+ BCKGRND COR BLD-ACNC: 0.02 IU/ML
M TB IFN-G CD4+ BCKGRND COR BLD-ACNC: 0.03 IU/ML
MITOGEN IGNF BCKGRD COR BLD-ACNC: 7.26 IU/ML
TB GOLD PLUS: NEGATIVE

## 2024-11-14 ENCOUNTER — TELEPHONE (OUTPATIENT)
Dept: RHEUMATOLOGY | Facility: CLINIC | Age: 69
End: 2024-11-14
Payer: MEDICARE

## 2024-11-14 DIAGNOSIS — L40.50 PSORIATIC ARTHRITIS: ICD-10-CM

## 2024-11-14 DIAGNOSIS — L40.0 PLAQUE PSORIASIS: Primary | ICD-10-CM

## 2024-11-14 RX ORDER — IXEKIZUMAB 80 MG/ML
INJECTION, SOLUTION SUBCUTANEOUS
Qty: 2 ML | Refills: 1 | Status: ACTIVE | OUTPATIENT
Start: 2024-11-14

## 2024-11-14 RX ORDER — IXEKIZUMAB 80 MG/ML
80 INJECTION, SOLUTION SUBCUTANEOUS
Qty: 1 ML | Refills: 10 | Status: ACTIVE | OUTPATIENT
Start: 2024-11-14

## 2024-11-14 RX ORDER — IXEKIZUMAB 80 MG/ML
INJECTION, SOLUTION SUBCUTANEOUS
Qty: 3 ML | Refills: 0 | Status: ACTIVE | OUTPATIENT
Start: 2024-11-14

## 2024-11-14 NOTE — TELEPHONE ENCOUNTER
----- Message from Med Assistant Arrieta sent at 11/14/2024  7:42 AM CST -----  Regarding: FW: Cosentyx    ----- Message -----  From: Maira Melo, Monica  Sent: 11/13/2024   4:33 PM CST  To: Eliana Dykes Staff  Subject: Cosentyx                                         Hi!    [facility] received patient's prescription for Cosentyx. The PA was denied, however, I can appeal this.  To appeal insurance requires reasoning why the following medications cannot be used as alternative therapy: Enbrel, Xeljanz, Taltz, Renflexis, Stelara, Avsola, or Skyrizi. Please let me know if you want to try one of these alternatives or the reason why they are not acceptable.    Thanks,  Maira Melo, PharmD  Clinical Pharmacist  [facility] [address], [place] [address]  [phone]

## 2024-11-14 NOTE — TELEPHONE ENCOUNTER
----- Message from Pharmacist Barbara sent at 11/14/2024  3:17 PM CST -----  Contact: Maricarmen    ----- Message -----  From: Maira Mleo PharmD  Sent: 11/14/2024  11:59 AM CST  To: Barbara Ravi PharmD    I tried to discuss with her, However, she wanted to speak to the prescriber.  ----- Message -----  From: Barbara Ravi PharmD  Sent: 11/14/2024  11:36 AM CST  To: Maira Melo PharmD    Have you already spoke to the patient about the medication concerns? If not would you please follow up with her?  ----- Message -----  From: Meenakshi Valdez MA  Sent: 11/14/2024  10:31 AM CST  To: Barbara Ravi PharmD      ----- Message -----  From: Jacquie David  Sent: 11/14/2024  10:14 AM CST  To: Eliana Dykes Staff    .Type:  Needs Medical Advice    Who Called:  Maricarmen     Would the patient rather a call back or a response via [facility]?  Call back   Best Call Back Number:   [phone]  Additional Information:  Pt is calling in regard to getting a call back to discuss questions and concerns pertaining to medication       Thanks

## 2024-11-27 ENCOUNTER — OFFICE VISIT (OUTPATIENT)
Dept: INTERNAL MEDICINE | Facility: CLINIC | Age: 69
End: 2024-11-27
Payer: COMMERCIAL

## 2024-11-27 VITALS
OXYGEN SATURATION: 97 % | BODY MASS INDEX: 25.47 KG/M2 | TEMPERATURE: 98 F | DIASTOLIC BLOOD PRESSURE: 86 MMHG | WEIGHT: 162.25 LBS | HEART RATE: 73 BPM | SYSTOLIC BLOOD PRESSURE: 130 MMHG | HEIGHT: 67 IN

## 2024-11-27 DIAGNOSIS — B02.9 HERPES ZOSTER WITHOUT COMPLICATION: Primary | ICD-10-CM

## 2024-11-27 DIAGNOSIS — Z79.899 DRUG-INDUCED IMMUNODEFICIENCY: ICD-10-CM

## 2024-11-27 DIAGNOSIS — G62.9 NEUROPATHY: ICD-10-CM

## 2024-11-27 DIAGNOSIS — D84.821 DRUG-INDUCED IMMUNODEFICIENCY: ICD-10-CM

## 2024-11-27 DIAGNOSIS — Z79.899 ENCOUNTER FOR LONG-TERM (CURRENT) USE OF HIGH-RISK MEDICATION: ICD-10-CM

## 2024-11-27 DIAGNOSIS — D84.9 IMMUNOCOMPROMISED: ICD-10-CM

## 2024-11-27 PROCEDURE — 3079F DIAST BP 80-89 MM HG: CPT | Mod: CPTII,S$GLB,, | Performed by: PHYSICIAN ASSISTANT

## 2024-11-27 PROCEDURE — 3008F BODY MASS INDEX DOCD: CPT | Mod: CPTII,S$GLB,, | Performed by: PHYSICIAN ASSISTANT

## 2024-11-27 PROCEDURE — 99214 OFFICE O/P EST MOD 30 MIN: CPT | Mod: S$GLB,,, | Performed by: PHYSICIAN ASSISTANT

## 2024-11-27 PROCEDURE — 99999 PR PBB SHADOW E&M-EST. PATIENT-LVL IV: CPT | Mod: PBBFAC,,, | Performed by: PHYSICIAN ASSISTANT

## 2024-11-27 PROCEDURE — 1101F PT FALLS ASSESS-DOCD LE1/YR: CPT | Mod: CPTII,S$GLB,, | Performed by: PHYSICIAN ASSISTANT

## 2024-11-27 PROCEDURE — 1160F RVW MEDS BY RX/DR IN RCRD: CPT | Mod: CPTII,S$GLB,, | Performed by: PHYSICIAN ASSISTANT

## 2024-11-27 PROCEDURE — 1159F MED LIST DOCD IN RCRD: CPT | Mod: CPTII,S$GLB,, | Performed by: PHYSICIAN ASSISTANT

## 2024-11-27 PROCEDURE — 3075F SYST BP GE 130 - 139MM HG: CPT | Mod: CPTII,S$GLB,, | Performed by: PHYSICIAN ASSISTANT

## 2024-11-27 PROCEDURE — 1125F AMNT PAIN NOTED PAIN PRSNT: CPT | Mod: CPTII,S$GLB,, | Performed by: PHYSICIAN ASSISTANT

## 2024-11-27 PROCEDURE — 3288F FALL RISK ASSESSMENT DOCD: CPT | Mod: CPTII,S$GLB,, | Performed by: PHYSICIAN ASSISTANT

## 2024-11-27 RX ORDER — VALACYCLOVIR HYDROCHLORIDE 1 G/1
1000 TABLET, FILM COATED ORAL 3 TIMES DAILY
Qty: 42 TABLET | Refills: 0 | Status: SHIPPED | OUTPATIENT
Start: 2024-11-27 | End: 2024-12-11

## 2024-11-27 NOTE — PROGRESS NOTES
"  Subjective:      Patient ID: Maricarmen Becerril is a 69 y.o. female.    Chief Complaint: Herpes Zoster (shingles)    HPI  History of Present Illness    CHIEF COMPLAINT:  Ms. Becerril presents today for evaluation of shingles.    SHINGLES SYMPTOMS:  She reports onset of shingles symptoms over the weekend, initially attributing them to a pre-existing herniated disc. She experienced sharp pains and used a heating pad, which she believes may have exacerbated the condition. She discovered a rash when changing clothes. The pain is worse when leaning against the sofa or while driving. She denies severe pain, describing it as "not that bad" and primarily noticeable when leaning or applying pressure to the affected area.    MEDICAL HISTORY:  She has a history of a herniated disc in her back and has been seeing a chiropractor for 9 months. She recently switched from Rinvoq to Taltz for an unspecified condition. She takes thyroid medication and Vitamin D3. She reports a previous negative experience with gabapentin, stating it affected her ability to function.    MEDICATION CONCERNS:  She expresses concerns about medication interactions. She reports stopping Rinvoq as instructed by her rheumatologist. She is unsure about starting Taltz while having shingles and seeks guidance on whether it is safe to begin the new medication.    Medications were reviewed    Patient Active Problem List   Diagnosis    Psoriatic arthritis    Hypothyroidism    Psoriasis    Back pain    Lumbar and sacral osteoarthritis    Encounter for long-term (current) use of high-risk medication    Immunocompromised    Shoulder pain, left    Allergic rhinitis    Vitamin D deficiency    Osteopenia with high risk of fracture    Colon cancer screening    Sciatica of left side    Hypercholesteremia    Trochanteric bursitis of both hips    Bilateral hip pain    Neuropathy    Drug-induced immunodeficiency    Statin declined         Current Outpatient " Medications:     calcium-vitamin D3 (OS-VONDA 500 + D3) 500 mg-5 mcg (200 unit) per tablet, Take 1 tablet by mouth 2 (two) times daily with meals., Disp: , Rfl:     ixekizumab (TALTZ AUTOINJECTOR) 80 mg/mL AtIn, Inject 80 mg into the skin every 28 days., Disp: 1 mL, Rfl: 10    ixekizumab (TALTZ AUTOINJECTOR, 2 PACK,) 80 mg/mL AtIn, Inject 80 mg SQ every other week on weeks 4, 6, 8, 10, 12. Then inject 80 mg SQ every 4 weeks thereafter., Disp: 2 mL, Rfl: 1    ixekizumab (TALTZ AUTOINJECTOR, 3 PACK,) 80 mg/mL AtIn, Inject 160 mg (2 pens) into the skin once, then inject 80 mg (1 pen) into the skin at week 2, Disp: 3 mL, Rfl: 0    levothyroxine (SYNTHROID) 88 MCG tablet, Take 1 tablet (88 mcg total) by mouth before breakfast., Disp: 90 tablet, Rfl: 3    omega-3 fatty acids/fish oil (FISH OIL-OMEGA-3 FATTY ACIDS) 300-1,000 mg capsule, Take by mouth once daily., Disp: , Rfl:     valACYclovir (VALTREX) 1000 MG tablet, Take 1 tablet (1,000 mg total) by mouth 3 (three) times daily. for 14 days, Disp: 42 tablet, Rfl: 0    Review of Systems   Constitutional:  Negative for activity change, appetite change, chills, diaphoresis, fatigue, fever and unexpected weight change.   HENT: Negative.  Negative for congestion, hearing loss, postnasal drip, rhinorrhea, sore throat, trouble swallowing and voice change.    Eyes: Negative.  Negative for visual disturbance.   Respiratory: Negative.  Negative for cough, choking, chest tightness and shortness of breath.    Cardiovascular:  Negative for chest pain, palpitations and leg swelling.   Gastrointestinal:  Negative for abdominal distention, abdominal pain, blood in stool, constipation, diarrhea, nausea and vomiting.   Endocrine: Negative for cold intolerance, heat intolerance, polydipsia and polyuria.   Genitourinary: Negative.  Negative for difficulty urinating and frequency.   Musculoskeletal:  Positive for back pain. Negative for arthralgias, gait problem, joint swelling and myalgias.  "  Skin:  Positive for rash. Negative for color change, pallor and wound.   Neurological:  Negative for dizziness, tremors, weakness, light-headedness, numbness and headaches.   Hematological:  Negative for adenopathy.   Psychiatric/Behavioral:  Negative for behavioral problems, confusion, self-injury, sleep disturbance and suicidal ideas. The patient is not nervous/anxious.      Objective:   /86 (BP Location: Left arm, Patient Position: Sitting)   Pulse 73   Temp 97.6 °F (36.4 °C) (Tympanic)   Ht 5' 7" (1.702 m)   Wt 73.6 kg (162 lb 4.1 oz)   SpO2 97%   BMI 25.41 kg/m²     Physical Exam  Vitals and nursing note reviewed.   Constitutional:       General: She is not in acute distress.     Appearance: Normal appearance. She is well-developed. She is not ill-appearing, toxic-appearing or diaphoretic.   HENT:      Head: Normocephalic and atraumatic.   Cardiovascular:      Rate and Rhythm: Normal rate and regular rhythm.      Heart sounds: Normal heart sounds. No murmur heard.     No friction rub. No gallop.   Pulmonary:      Effort: Pulmonary effort is normal. No respiratory distress.      Breath sounds: Normal breath sounds. No wheezing or rales.   Musculoskeletal:         General: Normal range of motion.   Skin:     General: Skin is warm.      Capillary Refill: Capillary refill takes less than 2 seconds.      Findings: Erythema and rash present. Rash is vesicular.          Neurological:      Mental Status: She is alert and oriented to person, place, and time.      Motor: No weakness.      Gait: Gait normal.   Psychiatric:         Mood and Affect: Mood normal.         Behavior: Behavior normal.         Thought Content: Thought content normal.         Judgment: Judgment normal.         Assessment:     1. Herpes zoster without complication    2. Encounter for long-term (current) use of high-risk medication    3. Drug-induced immunodeficiency    4. Neuropathy    5. Immunocompromised      Plan:   Herpes zoster " without complication  -     valACYclovir (VALTREX) 1000 MG tablet; Take 1 tablet (1,000 mg total) by mouth 3 (three) times daily. for 14 days  Dispense: 42 tablet; Refill: 0    Encounter for long-term (current) use of high-risk medication    Drug-induced immunodeficiency    Neuropathy    Immunocompromised      Educational handout on over-the-counter medications and at-home conservative care, pertinent to the patients diagnosis today, was handed to the patient and discussed in detail.    Assessment & Plan    Diagnosed patient with shingles based on clinical presentation  Initiated antiviral treatment with Valtrex (valacyclovir) to reduce duration and severity of shingles outbreak  Decided against starting Taltz (immunosuppressant) due to active shingles infection  Considered but declined prescribing gabapentin or prednisone for pain management as patient reported mild discomfort    SHINGLES (HERPES ZOSTER):  - Educated patient on shingles transmission: not directly contagious, but can potentially cause chickenpox in unvaccinated individuals, particularly infants.  - Explained that shingles resolves when vesicles crust over.  - Ms. Becerril to apply cool compresses to affected area for comfort.  - Ms. Becerril to avoid picking or popping blisters to reduce infection risk.  - Recommend ensuring family members, particularly young children, are up-to-date on chickenpox vaccinations.  - Started Valtrex (valacyclovir) 3 times daily for 14 days.  - Contact the office if pain becomes severe or condition worsens.    IMMUNOSUPPRESSIVE THERAPY MANAGEMENT:  - Discussed that starting immunosuppressive medication (Taltz) could compromise the body's ability to fight the shingles virus.  - Delayed starting Taltz until shingles resolves and clearance is obtained from rheumatologist.  - Send a message to the rheumatology department regarding timing of Taltz initiation.       Follow up if symptoms worsen or fail to improve.

## 2024-11-28 ENCOUNTER — PATIENT MESSAGE (OUTPATIENT)
Dept: RHEUMATOLOGY | Facility: CLINIC | Age: 69
End: 2024-11-28
Payer: MEDICARE

## 2024-12-03 ENCOUNTER — PATIENT MESSAGE (OUTPATIENT)
Dept: INTERNAL MEDICINE | Facility: CLINIC | Age: 69
End: 2024-12-03
Payer: MEDICARE

## 2024-12-06 ENCOUNTER — TELEPHONE (OUTPATIENT)
Dept: INTERNAL MEDICINE | Facility: CLINIC | Age: 69
End: 2024-12-06
Payer: MEDICARE

## 2024-12-06 ENCOUNTER — PATIENT MESSAGE (OUTPATIENT)
Dept: INTERNAL MEDICINE | Facility: CLINIC | Age: 69
End: 2024-12-06
Payer: MEDICARE

## 2024-12-06 DIAGNOSIS — B02.29 POST HERPETIC NEURALGIA: Primary | ICD-10-CM

## 2024-12-06 NOTE — TELEPHONE ENCOUNTER
Spoke with pt concerning same-day appointment request with . Stated to pt  doesn't have anything today, she could see another provider for issue she's having. Pt denied seeing another provider & any soon appointment with .NICK

## 2024-12-06 NOTE — TELEPHONE ENCOUNTER
----- Message from Any sent at 12/6/2024  8:04 AM CST -----  Contact: Maricarmen  Type:  Patient Returning Call    Who Called:Maricarmen  Who Left Message for Patient:Venice  Does the patient know what this is regarding?:Appointment  Would the patient rather a call back or a response via [facility]? Call back  Best Call Back Number:[phone] (home)   Additional Information:        Thanks  Дмитрий

## 2024-12-09 ENCOUNTER — TELEPHONE (OUTPATIENT)
Dept: INTERNAL MEDICINE | Facility: CLINIC | Age: 69
End: 2024-12-09
Payer: MEDICARE

## 2024-12-09 RX ORDER — GABAPENTIN 300 MG/1
300 CAPSULE ORAL 3 TIMES DAILY
Qty: 30 CAPSULE | Refills: 1 | Status: SHIPPED | OUTPATIENT
Start: 2024-12-09 | End: 2024-12-29

## 2024-12-09 NOTE — TELEPHONE ENCOUNTER
----- Message from Ottoniel sent at 12/9/2024  8:43 AM CST -----  Regarding: Patient Call Request - Update on Medication  Contact: Pt [phone]  .[email or web address]     Patient is calling for Medical Advice regarding: Patient wanted update on refill request she sent in on Friday. Please call patient to discuss.    How long has patient had these symptoms:    Pharmacy name and phone#:    Patient wants a call back or thru [facility]: [facility]    Comments:    Please advise patient replies from provider may take up to 48 hours.

## 2024-12-16 ENCOUNTER — PATIENT MESSAGE (OUTPATIENT)
Dept: RHEUMATOLOGY | Facility: CLINIC | Age: 69
End: 2024-12-16
Payer: MEDICARE

## 2024-12-17 ENCOUNTER — PATIENT MESSAGE (OUTPATIENT)
Dept: INTERNAL MEDICINE | Facility: CLINIC | Age: 69
End: 2024-12-17
Payer: MEDICARE

## 2024-12-17 DIAGNOSIS — B02.29 POSTHERPETIC NEURALGIA: Primary | ICD-10-CM

## 2024-12-18 RX ORDER — GABAPENTIN 600 MG/1
600 TABLET ORAL 3 TIMES DAILY
Qty: 30 TABLET | Refills: 0 | Status: SHIPPED | OUTPATIENT
Start: 2024-12-18 | End: 2024-12-28

## 2024-12-18 RX ORDER — LIDOCAINE 50 MG/G
1 PATCH TOPICAL DAILY
Qty: 30 PATCH | Refills: 0 | Status: SHIPPED | OUTPATIENT
Start: 2024-12-18 | End: 2025-01-17

## 2024-12-23 ENCOUNTER — PATIENT MESSAGE (OUTPATIENT)
Dept: INTERNAL MEDICINE | Facility: CLINIC | Age: 69
End: 2024-12-23
Payer: MEDICARE

## 2025-01-02 ENCOUNTER — PATIENT MESSAGE (OUTPATIENT)
Dept: INTERNAL MEDICINE | Facility: CLINIC | Age: 70
End: 2025-01-02
Payer: MEDICARE

## 2025-01-02 DIAGNOSIS — B02.29 POST HERPETIC NEURALGIA: ICD-10-CM

## 2025-01-02 RX ORDER — GABAPENTIN 300 MG/1
300 CAPSULE ORAL 3 TIMES DAILY
Qty: 30 CAPSULE | Refills: 1 | Status: SHIPPED | OUTPATIENT
Start: 2025-01-02 | End: 2025-01-22

## 2025-01-03 ENCOUNTER — PATIENT MESSAGE (OUTPATIENT)
Dept: INTERNAL MEDICINE | Facility: CLINIC | Age: 70
End: 2025-01-03
Payer: MEDICARE

## 2025-01-03 ENCOUNTER — PATIENT MESSAGE (OUTPATIENT)
Dept: RHEUMATOLOGY | Facility: CLINIC | Age: 70
End: 2025-01-03
Payer: MEDICARE

## 2025-01-05 ENCOUNTER — PATIENT MESSAGE (OUTPATIENT)
Dept: INTERNAL MEDICINE | Facility: CLINIC | Age: 70
End: 2025-01-05
Payer: MEDICARE

## 2025-01-06 DIAGNOSIS — B02.29 POSTHERPETIC NEURALGIA: ICD-10-CM

## 2025-01-07 RX ORDER — LIDOCAINE 50 MG/G
1 PATCH TOPICAL DAILY
Qty: 30 PATCH | Refills: 0 | Status: SHIPPED | OUTPATIENT
Start: 2025-01-07 | End: 2025-02-06

## 2025-01-31 ENCOUNTER — LAB VISIT (OUTPATIENT)
Dept: LAB | Facility: HOSPITAL | Age: 70
End: 2025-01-31
Attending: FAMILY MEDICINE
Payer: MEDICARE

## 2025-01-31 DIAGNOSIS — Z13.1 SCREENING FOR DIABETES MELLITUS: ICD-10-CM

## 2025-01-31 DIAGNOSIS — E03.9 ACQUIRED HYPOTHYROIDISM: ICD-10-CM

## 2025-01-31 DIAGNOSIS — E78.00 HYPERCHOLESTEREMIA: ICD-10-CM

## 2025-01-31 LAB
ALBUMIN SERPL BCP-MCNC: 3.7 G/DL (ref 3.5–5.2)
ALP SERPL-CCNC: 75 U/L (ref 40–150)
ALT SERPL W/O P-5'-P-CCNC: 22 U/L (ref 10–44)
ANION GAP SERPL CALC-SCNC: 10 MMOL/L (ref 8–16)
AST SERPL-CCNC: 18 U/L (ref 10–40)
BILIRUB SERPL-MCNC: 0.5 MG/DL (ref 0.1–1)
BUN SERPL-MCNC: 18 MG/DL (ref 8–23)
CALCIUM SERPL-MCNC: 10.4 MG/DL (ref 8.7–10.5)
CHLORIDE SERPL-SCNC: 104 MMOL/L (ref 95–110)
CHOLEST SERPL-MCNC: 252 MG/DL (ref 120–199)
CHOLEST/HDLC SERPL: 3.8 {RATIO} (ref 2–5)
CO2 SERPL-SCNC: 25 MMOL/L (ref 23–29)
CREAT SERPL-MCNC: 0.8 MG/DL (ref 0.5–1.4)
EST. GFR  (NO RACE VARIABLE): >60 ML/MIN/1.73 M^2
ESTIMATED AVG GLUCOSE: 111 MG/DL (ref 68–131)
GLUCOSE SERPL-MCNC: 79 MG/DL (ref 70–110)
HBA1C MFR BLD: 5.5 % (ref 4–5.6)
HDLC SERPL-MCNC: 66 MG/DL (ref 40–75)
HDLC SERPL: 26.2 % (ref 20–50)
LDLC SERPL CALC-MCNC: 172.4 MG/DL (ref 63–159)
NONHDLC SERPL-MCNC: 186 MG/DL
POTASSIUM SERPL-SCNC: 5.1 MMOL/L (ref 3.5–5.1)
PROT SERPL-MCNC: 8.6 G/DL (ref 6–8.4)
SODIUM SERPL-SCNC: 139 MMOL/L (ref 136–145)
TRIGL SERPL-MCNC: 68 MG/DL (ref 30–150)
TSH SERPL DL<=0.005 MIU/L-ACNC: 2.49 UIU/ML (ref 0.4–4)

## 2025-01-31 PROCEDURE — 84443 ASSAY THYROID STIM HORMONE: CPT | Performed by: FAMILY MEDICINE

## 2025-01-31 PROCEDURE — 80053 COMPREHEN METABOLIC PANEL: CPT | Performed by: FAMILY MEDICINE

## 2025-01-31 PROCEDURE — 83036 HEMOGLOBIN GLYCOSYLATED A1C: CPT | Performed by: FAMILY MEDICINE

## 2025-01-31 PROCEDURE — 36415 COLL VENOUS BLD VENIPUNCTURE: CPT | Performed by: FAMILY MEDICINE

## 2025-01-31 PROCEDURE — 80061 LIPID PANEL: CPT | Performed by: FAMILY MEDICINE

## 2025-02-20 NOTE — PATIENT INSTRUCTIONS
Get labwork done     Use steroid cream if rash doesn't improve will get derm to biopsy     leflunomide every other day, humira every other week    celebrex daily   
asu

## 2025-02-22 DIAGNOSIS — Z00.00 ENCOUNTER FOR MEDICARE ANNUAL WELLNESS EXAM: ICD-10-CM

## 2025-04-08 PROBLEM — E78.5 HYPERLIPIDEMIA LDL GOAL <130: Status: ACTIVE | Noted: 2022-04-28

## 2025-04-15 ENCOUNTER — PATIENT MESSAGE (OUTPATIENT)
Dept: RHEUMATOLOGY | Facility: CLINIC | Age: 70
End: 2025-04-15
Payer: COMMERCIAL

## 2025-05-01 ENCOUNTER — LAB VISIT (OUTPATIENT)
Dept: LAB | Facility: HOSPITAL | Age: 70
End: 2025-05-01
Attending: INTERNAL MEDICINE
Payer: MEDICARE

## 2025-05-01 DIAGNOSIS — L40.50 PSORIATIC ARTHRITIS: ICD-10-CM

## 2025-05-01 DIAGNOSIS — L40.0 PLAQUE PSORIASIS: ICD-10-CM

## 2025-05-01 DIAGNOSIS — Z79.899 DRUG-INDUCED IMMUNODEFICIENCY: ICD-10-CM

## 2025-05-01 DIAGNOSIS — D84.821 DRUG-INDUCED IMMUNODEFICIENCY: ICD-10-CM

## 2025-05-01 LAB
ABSOLUTE EOSINOPHIL (OHS): 0.17 K/UL
ABSOLUTE MONOCYTE (OHS): 0.37 K/UL (ref 0.3–1)
ABSOLUTE NEUTROPHIL COUNT (OHS): 2.05 K/UL (ref 1.8–7.7)
ALBUMIN SERPL BCP-MCNC: 4.1 G/DL (ref 3.5–5.2)
ALP SERPL-CCNC: 66 UNIT/L (ref 40–150)
ALT SERPL W/O P-5'-P-CCNC: 16 UNIT/L (ref 10–44)
ANION GAP (OHS): 9 MMOL/L (ref 8–16)
AST SERPL-CCNC: 18 UNIT/L (ref 11–45)
BASOPHILS # BLD AUTO: 0.05 K/UL
BASOPHILS NFR BLD AUTO: 1.3 %
BILIRUB SERPL-MCNC: 0.5 MG/DL (ref 0.1–1)
BUN SERPL-MCNC: 18 MG/DL (ref 8–23)
CALCIUM SERPL-MCNC: 10.3 MG/DL (ref 8.7–10.5)
CHLORIDE SERPL-SCNC: 105 MMOL/L (ref 95–110)
CO2 SERPL-SCNC: 25 MMOL/L (ref 23–29)
CREAT SERPL-MCNC: 0.9 MG/DL (ref 0.5–1.4)
CRP SERPL-MCNC: 5.9 MG/L
ERYTHROCYTE [DISTWIDTH] IN BLOOD BY AUTOMATED COUNT: 14.5 % (ref 11.5–14.5)
ERYTHROCYTE [SEDIMENTATION RATE] IN BLOOD: <2 MM/HR
GFR SERPLBLD CREATININE-BSD FMLA CKD-EPI: >60 ML/MIN/1.73/M2
GLUCOSE SERPL-MCNC: 121 MG/DL (ref 70–110)
HCT VFR BLD AUTO: 44.4 % (ref 37–48.5)
HGB BLD-MCNC: 14.1 GM/DL (ref 12–16)
IMM GRANULOCYTES # BLD AUTO: 0 K/UL (ref 0–0.04)
IMM GRANULOCYTES NFR BLD AUTO: 0 % (ref 0–0.5)
LYMPHOCYTES # BLD AUTO: 1.33 K/UL (ref 1–4.8)
MCH RBC QN AUTO: 29.1 PG (ref 27–31)
MCHC RBC AUTO-ENTMCNC: 31.8 G/DL (ref 32–36)
MCV RBC AUTO: 92 FL (ref 82–98)
NUCLEATED RBC (/100WBC) (OHS): 0 /100 WBC
PLATELET # BLD AUTO: 258 K/UL (ref 150–450)
PMV BLD AUTO: 9.2 FL (ref 9.2–12.9)
POTASSIUM SERPL-SCNC: 5.9 MMOL/L (ref 3.5–5.1)
PROT SERPL-MCNC: 8.5 GM/DL (ref 6–8.4)
RBC # BLD AUTO: 4.84 M/UL (ref 4–5.4)
RELATIVE EOSINOPHIL (OHS): 4.3 %
RELATIVE LYMPHOCYTE (OHS): 33.5 % (ref 18–48)
RELATIVE MONOCYTE (OHS): 9.3 % (ref 4–15)
RELATIVE NEUTROPHIL (OHS): 51.6 % (ref 38–73)
SODIUM SERPL-SCNC: 139 MMOL/L (ref 136–145)
WBC # BLD AUTO: 3.97 K/UL (ref 3.9–12.7)

## 2025-05-01 PROCEDURE — 36415 COLL VENOUS BLD VENIPUNCTURE: CPT

## 2025-05-01 PROCEDURE — 86140 C-REACTIVE PROTEIN: CPT

## 2025-05-01 PROCEDURE — 80053 COMPREHEN METABOLIC PANEL: CPT

## 2025-05-01 PROCEDURE — 85025 COMPLETE CBC W/AUTO DIFF WBC: CPT

## 2025-05-01 PROCEDURE — 85652 RBC SED RATE AUTOMATED: CPT

## 2025-05-02 ENCOUNTER — TELEPHONE (OUTPATIENT)
Dept: INTERNAL MEDICINE | Facility: CLINIC | Age: 70
End: 2025-05-02
Payer: COMMERCIAL

## 2025-05-02 DIAGNOSIS — R07.9 CHEST PAIN, UNSPECIFIED TYPE: Primary | ICD-10-CM

## 2025-05-02 NOTE — TELEPHONE ENCOUNTER
Spoke with the patient stated that she would like to see a cardiology provider. The patient stated that she been having some SOB and chest concerns and would like to see a heart doctor. The patient stated that she do not want to come in and see her PCP only need a referral. The patient was informed that a  message will be sent to Dr. Viramontes.

## 2025-05-02 NOTE — TELEPHONE ENCOUNTER
----- Message from Pj Mari sent at 5/1/2025  3:53 PM CDT -----  Contact: Maricarmen  Type:  Patient Requesting ReferralWho Called: DeniseDoes the patient already have the specialty appointment scheduled?: NoIf yes, what is the date of that appointment?:Referral to What Specialty: CardiologyReason for Referral:Heart rate concerns Does the patient want the referral with a specific physician?:Yes, Dr. Ryan Sarabia at [facility] the [facility]?:Non-[facility]Patient Requesting a Response?: YesWould the patient rather a call back or a response via [facility]?  [facility] Call Back Number: [phone] Information: The Fax number is [phone]

## 2025-05-02 NOTE — TELEPHONE ENCOUNTER
I have contacted the pt and she does not wish to [facility] [place] If you can fax the referral to [phone]

## 2025-05-05 ENCOUNTER — LAB VISIT (OUTPATIENT)
Dept: LAB | Facility: HOSPITAL | Age: 70
End: 2025-05-05
Attending: PHYSICIAN ASSISTANT
Payer: COMMERCIAL

## 2025-05-05 ENCOUNTER — OFFICE VISIT (OUTPATIENT)
Dept: INTERNAL MEDICINE | Facility: CLINIC | Age: 70
End: 2025-05-05
Payer: COMMERCIAL

## 2025-05-05 VITALS
HEIGHT: 67 IN | BODY MASS INDEX: 24.78 KG/M2 | OXYGEN SATURATION: 94 % | DIASTOLIC BLOOD PRESSURE: 82 MMHG | HEART RATE: 76 BPM | TEMPERATURE: 98 F | WEIGHT: 157.88 LBS | SYSTOLIC BLOOD PRESSURE: 106 MMHG

## 2025-05-05 DIAGNOSIS — E87.5 HYPERKALEMIA: ICD-10-CM

## 2025-05-05 DIAGNOSIS — R07.9 CHEST PAIN, UNSPECIFIED TYPE: ICD-10-CM

## 2025-05-05 DIAGNOSIS — R00.2 HEART PALPITATIONS: ICD-10-CM

## 2025-05-05 DIAGNOSIS — Z13.6 ENCOUNTER FOR SCREENING FOR CARDIOVASCULAR DISORDERS: ICD-10-CM

## 2025-05-05 DIAGNOSIS — E78.2 MIXED HYPERLIPIDEMIA: ICD-10-CM

## 2025-05-05 DIAGNOSIS — Z12.31 ENCOUNTER FOR SCREENING MAMMOGRAM FOR MALIGNANT NEOPLASM OF BREAST: ICD-10-CM

## 2025-05-05 DIAGNOSIS — R06.02 SHORTNESS OF BREATH: Primary | ICD-10-CM

## 2025-05-05 DIAGNOSIS — R06.02 SHORTNESS OF BREATH: ICD-10-CM

## 2025-05-05 LAB
ANION GAP (OHS): 12 MMOL/L (ref 8–16)
BUN SERPL-MCNC: 15 MG/DL (ref 8–23)
CALCIUM SERPL-MCNC: 10.5 MG/DL (ref 8.7–10.5)
CHLORIDE SERPL-SCNC: 107 MMOL/L (ref 95–110)
CO2 SERPL-SCNC: 25 MMOL/L (ref 23–29)
CREAT SERPL-MCNC: 0.8 MG/DL (ref 0.5–1.4)
D DIMER PPP IA.FEU-MCNC: 9.38 MG/L FEU
GFR SERPLBLD CREATININE-BSD FMLA CKD-EPI: >60 ML/MIN/1.73/M2
GLUCOSE SERPL-MCNC: 111 MG/DL (ref 70–110)
POTASSIUM SERPL-SCNC: 5 MMOL/L (ref 3.5–5.1)
SODIUM SERPL-SCNC: 144 MMOL/L (ref 136–145)
TROPONIN I SERPL DL<=0.01 NG/ML-MCNC: 0.01 NG/ML

## 2025-05-05 PROCEDURE — 36415 COLL VENOUS BLD VENIPUNCTURE: CPT

## 2025-05-05 PROCEDURE — 1101F PT FALLS ASSESS-DOCD LE1/YR: CPT | Mod: CPTII,S$GLB,, | Performed by: PHYSICIAN ASSISTANT

## 2025-05-05 PROCEDURE — 99214 OFFICE O/P EST MOD 30 MIN: CPT | Mod: S$GLB,,, | Performed by: PHYSICIAN ASSISTANT

## 2025-05-05 PROCEDURE — 1159F MED LIST DOCD IN RCRD: CPT | Mod: CPTII,S$GLB,, | Performed by: PHYSICIAN ASSISTANT

## 2025-05-05 PROCEDURE — 3288F FALL RISK ASSESSMENT DOCD: CPT | Mod: CPTII,S$GLB,, | Performed by: PHYSICIAN ASSISTANT

## 2025-05-05 PROCEDURE — 85379 FIBRIN DEGRADATION QUANT: CPT

## 2025-05-05 PROCEDURE — 1126F AMNT PAIN NOTED NONE PRSNT: CPT | Mod: CPTII,S$GLB,, | Performed by: PHYSICIAN ASSISTANT

## 2025-05-05 PROCEDURE — 84484 ASSAY OF TROPONIN QUANT: CPT

## 2025-05-05 PROCEDURE — 3079F DIAST BP 80-89 MM HG: CPT | Mod: CPTII,S$GLB,, | Performed by: PHYSICIAN ASSISTANT

## 2025-05-05 PROCEDURE — 3074F SYST BP LT 130 MM HG: CPT | Mod: CPTII,S$GLB,, | Performed by: PHYSICIAN ASSISTANT

## 2025-05-05 PROCEDURE — 3044F HG A1C LEVEL LT 7.0%: CPT | Mod: CPTII,S$GLB,, | Performed by: PHYSICIAN ASSISTANT

## 2025-05-05 PROCEDURE — 80048 BASIC METABOLIC PNL TOTAL CA: CPT

## 2025-05-05 PROCEDURE — 99999 PR PBB SHADOW E&M-EST. PATIENT-LVL V: CPT | Mod: PBBFAC,,, | Performed by: PHYSICIAN ASSISTANT

## 2025-05-05 PROCEDURE — 3008F BODY MASS INDEX DOCD: CPT | Mod: CPTII,S$GLB,, | Performed by: PHYSICIAN ASSISTANT

## 2025-05-05 PROCEDURE — 1160F RVW MEDS BY RX/DR IN RCRD: CPT | Mod: CPTII,S$GLB,, | Performed by: PHYSICIAN ASSISTANT

## 2025-05-05 PROCEDURE — G2211 COMPLEX E/M VISIT ADD ON: HCPCS | Mod: S$GLB,,, | Performed by: PHYSICIAN ASSISTANT

## 2025-05-05 NOTE — PROGRESS NOTES
Subjective:      Patient ID: Maricarmen Becerril is a 69 y.o. female.    Chief Complaint: Shortness of Breath    HPI  History of Present Illness    CHIEF COMPLAINT:  Ms. Becerril presents today for worsening shortness of breath and concerns about heart symptoms.    RESPIRATORY:  She reports experiencing shortness of breath both at rest and with exertion, including during minimal activity such as walking short distances. While this has been a longstanding issue, symptoms have recently worsened. She experienced a severe coughing episode last night lasting two hours with symptoms of choking. The cough worsens when lying down in bed.    CARDIOVASCULAR:  She reports experiencing a flutter or tickle sensation in her chest lasting a few seconds. Her heart rate was elevated at 113 while at rest. She also reports longstanding chest pressure.    POST-HERPETIC NEURALGIA:  She continues to experience post-shingles symptoms 5 months after initial outbreak, including shooting pains in stomach and back with associated burning and itching sensations. She describes constant skin sensitivity. She continues taking Gabapentin for symptom management.    LABS:  Laboratory results showed elevated potassium, glucose, and total protein levels.    Medications were reviewed        The 10-year ASCVD risk score (Michael LONGO, et al., 2019) is: 6.2%    Values used to calculate the score:      Age: 69 years      Sex: Female      Is Non- : No      Diabetic: No      Tobacco smoker: No      Systolic Blood Pressure: 106 mmHg      Is BP treated: No      HDL Cholesterol: 66 mg/dL      Total Cholesterol: 252 mg/dL   Problem List[1]    Current Medications[2]    Review of Systems   Constitutional:  Positive for fatigue. Negative for activity change, appetite change, chills, diaphoresis, fever and unexpected weight change.   HENT: Negative.  Negative for congestion, hearing loss, postnasal drip, rhinorrhea, sore throat,  "trouble swallowing and voice change.    Eyes: Negative.  Negative for visual disturbance.   Respiratory:  Positive for chest tightness and shortness of breath. Negative for cough and choking.    Cardiovascular:  Positive for chest pain and palpitations. Negative for leg swelling.   Gastrointestinal:  Negative for abdominal distention, abdominal pain, blood in stool, constipation, diarrhea, nausea and vomiting.   Endocrine: Negative for cold intolerance, heat intolerance, polydipsia and polyuria.   Genitourinary: Negative.  Negative for difficulty urinating and frequency.   Musculoskeletal:  Negative for arthralgias, back pain, gait problem, joint swelling and myalgias.   Skin:  Negative for color change, pallor, rash and wound.   Neurological:  Negative for dizziness, tremors, weakness, light-headedness, numbness and headaches.   Hematological:  Negative for adenopathy.   Psychiatric/Behavioral:  Negative for behavioral problems, confusion, self-injury, sleep disturbance and suicidal ideas. The patient is not nervous/anxious.      Objective:   /82 (BP Location: Right arm, Patient Position: Sitting)   Pulse 76   Temp 97.8 °F (36.6 °C) (Tympanic)   Ht 5' 7" (1.702 m)   Wt 71.6 kg (157 lb 13.6 oz)   SpO2 (!) 94%   BMI 24.72 kg/m²     Physical Exam  Vitals and nursing note reviewed.   Constitutional:       General: She is not in acute distress.     Appearance: Normal appearance. She is well-developed. She is not ill-appearing, toxic-appearing or diaphoretic.   HENT:      Head: Normocephalic and atraumatic.   Cardiovascular:      Rate and Rhythm: Normal rate and regular rhythm.      Heart sounds: Normal heart sounds. No murmur heard.     No friction rub. No gallop.   Pulmonary:      Effort: Pulmonary effort is normal. No respiratory distress.      Breath sounds: Normal breath sounds. No wheezing or rales.   Musculoskeletal:         General: Normal range of motion.   Skin:     General: Skin is warm.      " Capillary Refill: Capillary refill takes less than 2 seconds.      Findings: No rash.   Neurological:      Mental Status: She is alert and oriented to person, place, and time.      Motor: No weakness.      Gait: Gait normal.   Psychiatric:         Mood and Affect: Mood normal.         Behavior: Behavior normal.         Thought Content: Thought content normal.         Judgment: Judgment normal.       Lab Visit on 05/01/2025   Component Date Value Ref Range Status    Sodium 05/01/2025 139  136 - 145 mmol/L Final    Potassium 05/01/2025 5.9 (H)  3.5 - 5.1 mmol/L Final    Chloride 05/01/2025 105  95 - 110 mmol/L Final    CO2 05/01/2025 25  23 - 29 mmol/L Final    Glucose 05/01/2025 121 (H)  70 - 110 mg/dL Final    BUN 05/01/2025 18  8 - 23 mg/dL Final    Creatinine 05/01/2025 0.9  0.5 - 1.4 mg/dL Final    Calcium 05/01/2025 10.3  8.7 - 10.5 mg/dL Final    Protein Total 05/01/2025 8.5 (H)  6.0 - 8.4 gm/dL Final    Albumin 05/01/2025 4.1  3.5 - 5.2 g/dL Final    Bilirubin Total 05/01/2025 0.5  0.1 - 1.0 mg/dL Final    For infants and newborns, interpretation of results should be based   on gestational age, weight and in agreement with clinical   observations.    Premature Infant recommended reference ranges:   0-24 hours:  <8.0 mg/dL   24-48 hours: <12.0 mg/dL   3-5 days:    <15.0 mg/dL   6-29 days:   <15.0 mg/dL    ALP 05/01/2025 66  40 - 150 unit/L Final    AST 05/01/2025 18  11 - 45 unit/L Final    ALT 05/01/2025 16  10 - 44 unit/L Final    Anion Gap 05/01/2025 9  8 - 16 mmol/L Final    eGFR 05/01/2025 >60  >60 mL/min/1.73/m2 Final    Estimated GFR calculated using the CKD-EPI creatinine (2021) equation.    CRP 05/01/2025 5.9  <=8.2 mg/L Final    Sed Rate 05/01/2025 <2  <=36 mm/hr Final    WBC 05/01/2025 3.97  3.90 - 12.70 K/uL Final    RBC 05/01/2025 4.84  4.00 - 5.40 M/uL Final    HGB 05/01/2025 14.1  12.0 - 16.0 gm/dL Final    HCT 05/01/2025 44.4  37.0 - 48.5 % Final    MCV 05/01/2025 92  82 - 98 fL Final    MCH  05/01/2025 29.1  27.0 - 31.0 pg Final    MCHC 05/01/2025 31.8 (L)  32.0 - 36.0 g/dL Final    RDW 05/01/2025 14.5  11.5 - 14.5 % Final    Platelet Count 05/01/2025 258  150 - 450 K/uL Final    MPV 05/01/2025 9.2  9.2 - 12.9 fL Final    Nucleated RBC 05/01/2025 0  <=0 /100 WBC Final    Neut % 05/01/2025 51.6  38 - 73 % Final    Lymph % 05/01/2025 33.5  18 - 48 % Final    Mono % 05/01/2025 9.3  4 - 15 % Final    Eos % 05/01/2025 4.3  <=8 % Final    Basophil % 05/01/2025 1.3  <=1.9 % Final    Imm Grans % 05/01/2025 0.0  0.0 - 0.5 % Final    Neut # 05/01/2025 2.05  1.8 - 7.7 K/uL Final    Lymph # 05/01/2025 1.33  1 - 4.8 K/uL Final    Mono # 05/01/2025 0.37  0.3 - 1 K/uL Final    Eos # 05/01/2025 0.17  <=0.5 K/uL Final    Baso # 05/01/2025 0.05  <=0.2 K/uL Final    Imm Grans # 05/01/2025 0.00  0.00 - 0.04 K/uL Final     Lab Results   Component Value Date    CHOL 252 (H) 01/31/2025    CHOL 283 (H) 02/06/2024    CHOL 273 (H) 06/27/2023      Lab Results   Component Value Date    HDL 66 01/31/2025    HDL 77 (H) 02/06/2024    HDL 68 06/27/2023     Lab Results   Component Value Date    LDLCALC 172.4 (H) 01/31/2025    LDLCALC 193.4 (H) 02/06/2024    LDLCALC 192.2 (H) 06/27/2023      Lab Results   Component Value Date    TRIG 68 01/31/2025    TRIG 63 02/06/2024    TRIG 64 06/27/2023     Lab Results   Component Value Date    TOTALCHOLEST 3.8 01/31/2025    TOTALCHOLEST 3.7 02/06/2024    TOTALCHOLEST 4.0 06/27/2023     Lab Results   Component Value Date    NONHDLCHOL 186 01/31/2025    NONHDLCHOL 206 02/06/2024    NONHDLCHOL 205 06/27/2023     Lab Results   Component Value Date    CHOLHDL 26.2 01/31/2025    CHOLHDL 27.2 02/06/2024    CHOLHDL 24.9 06/27/2023      Assessment:     1. Shortness of breath    2. Chest pain, unspecified type    3. Hyperkalemia    4. Heart palpitations    5. Mixed hyperlipidemia    6. Encounter for screening for cardiovascular disorders    7. Encounter for screening mammogram for malignant neoplasm of  breast      Plan:   Shortness of breath  -     Troponin I; Future; Expected date: 05/05/2025  -     EKG 12-lead; Future  -     Ambulatory referral/consult to Cardiology  -     Basic Metabolic Panel; Future; Expected date: 05/05/2025  -     D-Dimer, Quantitative; Future; Expected date: 05/05/2025    Chest pain, unspecified type  -     Troponin I; Future; Expected date: 05/05/2025  -     EKG 12-lead; Future  -     D-Dimer, Quantitative; Future; Expected date: 05/05/2025    Hyperkalemia    Heart palpitations  -     EKG 12-lead; Future  -     Ambulatory referral/consult to Cardiology  -     Basic Metabolic Panel; Future; Expected date: 05/05/2025    Mixed hyperlipidemia  -     CT Cardiac Scoring; Future; Expected date: 05/05/2025    Encounter for screening for cardiovascular disorders  -     CT Cardiac Scoring; Future; Expected date: 05/05/2025    Encounter for screening mammogram for malignant neoplasm of breast  -     Mammo Digital Screening Bilat w/ Aajy (XPD); Future; Expected date: 05/05/2025      Considered cardiac evaluation due to reported shortness of breath, chest flutter, and elevated heart rate.  Ruling out potential cardiac issues before attributing symptoms to other causes.  Assessed for possible pumonary embolism with D-dimer test, which was explained as a good rule-out test for blood clots.  Evaluated high cholesterol with calcium scoring test for assessing cardiac risk.  Monitoring potassium levels, which were slightly elevated, possibly due to dehydration.    ORTHOPNEA AND SHORTNESS OF BREATH:  - Evaluated patient's worsening shortness of breath occurring both at rest and with exertion.  - Ms. Becerril reports coughing at night when lying down, suggesting possible orthopnea.  - Ordered chest XR and EKG to assess.  - Will rule out cardiac causes with stress test and Holter monitor.  - Referred patient to cardiologist for further evaluation.    TACHYCARDIA:  - Monitored patient's heart rate of 113  while sitting/at rest, indicating tachycardia.  - Will evaluate heart condition with stress test and Holter monitor.  - Ordered EKG and referred patient to cardiologist for further evaluation.  -requesting external referral to see Dr. Sarabia.     POSTHERPETIC NERVOUS SYSTEM INVOLVEMENT:  - Monitored ongoing symptoms from shingles, including shooting pains, pruritus, and burning sensations, which are now tolerable.  - Discussed duration and management of postherpetic symptoms.  - Continued Gabapentin for symptom management.    COUGH:  - Monitored patient's persistent cough occurring at night for approximately 2 hours, preventing sleep.  - Evaluated possible relationship to lying down position.  - Considering potential causes as part of overall workup.    FOLLOW-UP:  - Referred patient to Cardiology with Dr. Barth.  - Instructed patient to contact office if unable to secure timely appointment with cardiologist, so stress test and Holter monitor can be ordered directly.       This note was generated with the assistance of ambient listening technology. Verbal consent was obtained by the patient and accompanying visitor(s) for the recording of patient appointment to facilitate this note. I attest to having reviewed and edited the generated note for accuracy, though some syntax or spelling errors may persist. Please contact the author of this note for any clarification.      Follow up if symptoms worsen or fail to improve.           [1]   Patient Active Problem List  Diagnosis    Psoriatic arthritis    Hypothyroidism    Psoriasis    Back pain    Lumbar and sacral osteoarthritis    Encounter for long-term (current) use of high-risk medication    Immunocompromised    Shoulder pain, left    Allergic rhinitis    Vitamin D deficiency    Osteopenia with high risk of fracture    Colon cancer screening    Sciatica of left side    Hyperlipidemia LDL goal <130    Trochanteric bursitis of both hips    Bilateral hip pain     Neuropathy    Drug-induced immunodeficiency    Statin declined   [2]   Current Outpatient Medications:     calcium-vitamin D3 (OS-VONDA 500 + D3) 500 mg-5 mcg (200 unit) per tablet, Take 1 tablet by mouth 2 (two) times daily with meals., Disp: , Rfl:     gabapentin (NEURONTIN) 300 MG capsule, Take 1 capsule (300 mg total) by mouth 3 (three) times daily. for 20 days, Disp: 30 capsule, Rfl: 1    ixekizumab (TALTZ AUTOINJECTOR) 80 mg/mL AtIn, Inject 80 mg into the skin every 28 days., Disp: 1 mL, Rfl: 10    ixekizumab (TALTZ AUTOINJECTOR, 2 PACK,) 80 mg/mL AtIn, Inject 80 mg (1 pen) into the skin every other week on weeks 4, 6, 8, 10, 12. Then inject 80 mg (1 pen)  into the skin every 4 weeks thereafter., Disp: 2 mL, Rfl: 1    ixekizumab (TALTZ AUTOINJECTOR, 3 PACK,) 80 mg/mL AtIn, Inject 160 mg (2 pens) into the skin once, then inject 80 mg (1 pen) into the skin at week 2, Disp: 3 mL, Rfl: 0    levothyroxine (SYNTHROID) 88 MCG tablet, Take 1 tablet (88 mcg total) by mouth before breakfast., Disp: 90 tablet, Rfl: 3    omega-3 fatty acids/fish oil (FISH OIL-OMEGA-3 FATTY ACIDS) 300-1,000 mg capsule, Take by mouth once daily., Disp: , Rfl:     valACYclovir (VALTREX) 1000 MG tablet, Take 1 tablet (1,000 mg total) by mouth 3 (three) times daily. for 14 days, Disp: 42 tablet, Rfl: 0

## 2025-05-06 ENCOUNTER — RESULTS FOLLOW-UP (OUTPATIENT)
Dept: INTERNAL MEDICINE | Facility: CLINIC | Age: 70
End: 2025-05-06

## 2025-05-06 ENCOUNTER — OFFICE VISIT (OUTPATIENT)
Dept: CARDIOLOGY | Facility: CLINIC | Age: 70
End: 2025-05-06
Payer: COMMERCIAL

## 2025-05-06 ENCOUNTER — HOSPITAL ENCOUNTER (OUTPATIENT)
Dept: RADIOLOGY | Facility: HOSPITAL | Age: 70
Discharge: HOME | End: 2025-05-06
Attending: PHYSICIAN ASSISTANT
Payer: COMMERCIAL

## 2025-05-06 ENCOUNTER — PATIENT MESSAGE (OUTPATIENT)
Dept: INTERNAL MEDICINE | Facility: CLINIC | Age: 70
End: 2025-05-06
Payer: COMMERCIAL

## 2025-05-06 ENCOUNTER — HOSPITAL ENCOUNTER (OUTPATIENT)
Dept: CARDIOLOGY | Facility: HOSPITAL | Age: 70
Discharge: HOME | End: 2025-05-06
Attending: PHYSICIAN ASSISTANT
Payer: COMMERCIAL

## 2025-05-06 ENCOUNTER — OCHSNER VIRTUAL EMERGENCY DEPARTMENT (OUTPATIENT)
Facility: CLINIC | Age: 70
End: 2025-05-06
Payer: COMMERCIAL

## 2025-05-06 VITALS
WEIGHT: 158.31 LBS | DIASTOLIC BLOOD PRESSURE: 90 MMHG | BODY MASS INDEX: 24.85 KG/M2 | OXYGEN SATURATION: 96 % | SYSTOLIC BLOOD PRESSURE: 136 MMHG | HEIGHT: 67 IN | HEART RATE: 61 BPM

## 2025-05-06 VITALS — WEIGHT: 158.31 LBS | BODY MASS INDEX: 24.79 KG/M2

## 2025-05-06 DIAGNOSIS — R79.89 D-DIMER, ELEVATED: ICD-10-CM

## 2025-05-06 DIAGNOSIS — R07.9 CHEST PAIN, UNSPECIFIED TYPE: ICD-10-CM

## 2025-05-06 DIAGNOSIS — R00.2 HEART PALPITATIONS: ICD-10-CM

## 2025-05-06 DIAGNOSIS — E03.9 ACQUIRED HYPOTHYROIDISM: ICD-10-CM

## 2025-05-06 DIAGNOSIS — I26.99 PULMONARY EMBOLISM, UNSPECIFIED CHRONICITY, UNSPECIFIED PULMONARY EMBOLISM TYPE, UNSPECIFIED WHETHER ACUTE COR PULMONALE PRESENT: Primary | ICD-10-CM

## 2025-05-06 DIAGNOSIS — R06.09 OTHER FORM OF DYSPNEA: ICD-10-CM

## 2025-05-06 DIAGNOSIS — R07.9 CHEST PAIN, UNSPECIFIED TYPE: Primary | ICD-10-CM

## 2025-05-06 DIAGNOSIS — I26.99 ACUTE PULMONARY EMBOLISM, UNSPECIFIED PULMONARY EMBOLISM TYPE, UNSPECIFIED WHETHER ACUTE COR PULMONALE PRESENT: Primary | ICD-10-CM

## 2025-05-06 DIAGNOSIS — R06.02 SHORTNESS OF BREATH: ICD-10-CM

## 2025-05-06 DIAGNOSIS — R60.0 LOCALIZED EDEMA: ICD-10-CM

## 2025-05-06 DIAGNOSIS — L40.50 PSORIATIC ARTHRITIS: ICD-10-CM

## 2025-05-06 LAB
OHS QRS DURATION: 74 MS
OHS QTC CALCULATION: 431 MS

## 2025-05-06 PROCEDURE — 1160F RVW MEDS BY RX/DR IN RCRD: CPT | Mod: CPTII,S$GLB,, | Performed by: INTERNAL MEDICINE

## 2025-05-06 PROCEDURE — 93005 ELECTROCARDIOGRAM TRACING: CPT

## 2025-05-06 PROCEDURE — 3008F BODY MASS INDEX DOCD: CPT | Mod: CPTII,S$GLB,, | Performed by: INTERNAL MEDICINE

## 2025-05-06 PROCEDURE — 3075F SYST BP GE 130 - 139MM HG: CPT | Mod: CPTII,S$GLB,, | Performed by: INTERNAL MEDICINE

## 2025-05-06 PROCEDURE — 1159F MED LIST DOCD IN RCRD: CPT | Mod: CPTII,S$GLB,, | Performed by: INTERNAL MEDICINE

## 2025-05-06 PROCEDURE — 99204 OFFICE O/P NEW MOD 45 MIN: CPT | Mod: S$GLB,,, | Performed by: INTERNAL MEDICINE

## 2025-05-06 PROCEDURE — 1101F PT FALLS ASSESS-DOCD LE1/YR: CPT | Mod: CPTII,S$GLB,, | Performed by: INTERNAL MEDICINE

## 2025-05-06 PROCEDURE — 3080F DIAST BP >= 90 MM HG: CPT | Mod: CPTII,S$GLB,, | Performed by: INTERNAL MEDICINE

## 2025-05-06 PROCEDURE — 1126F AMNT PAIN NOTED NONE PRSNT: CPT | Mod: CPTII,S$GLB,, | Performed by: INTERNAL MEDICINE

## 2025-05-06 PROCEDURE — 99999 PR PBB SHADOW E&M-EST. PATIENT-LVL IV: CPT | Mod: PBBFAC,,, | Performed by: INTERNAL MEDICINE

## 2025-05-06 PROCEDURE — 25500020 PHARM REV CODE 255: Performed by: PHYSICIAN ASSISTANT

## 2025-05-06 PROCEDURE — 93010 ELECTROCARDIOGRAM REPORT: CPT | Mod: ,,, | Performed by: INTERNAL MEDICINE

## 2025-05-06 PROCEDURE — 71275 CT ANGIOGRAPHY CHEST: CPT | Mod: 26,,, | Performed by: RADIOLOGY

## 2025-05-06 PROCEDURE — 71275 CT ANGIOGRAPHY CHEST: CPT | Mod: TC

## 2025-05-06 PROCEDURE — 3288F FALL RISK ASSESSMENT DOCD: CPT | Mod: CPTII,S$GLB,, | Performed by: INTERNAL MEDICINE

## 2025-05-06 PROCEDURE — 3044F HG A1C LEVEL LT 7.0%: CPT | Mod: CPTII,S$GLB,, | Performed by: INTERNAL MEDICINE

## 2025-05-06 RX ORDER — APIXABAN 5 MG (74)
KIT ORAL
Qty: 74 TABLET | Refills: 0 | Status: SHIPPED | OUTPATIENT
Start: 2025-05-06 | End: 2025-05-06

## 2025-05-06 RX ORDER — APIXABAN 5 MG (74)
KIT ORAL
Qty: 74 TABLET | Refills: 0 | Status: SHIPPED | OUTPATIENT
Start: 2025-05-06

## 2025-05-06 RX ADMIN — IOHEXOL 100 ML: 350 INJECTION, SOLUTION INTRAVENOUS at 12:05

## 2025-05-06 NOTE — TELEPHONE ENCOUNTER
Tried calling the patient with no answer and unable to leave a message. The requested referral was sent to Dr Ryan Sarabia' office fax# [phone].

## 2025-05-06 NOTE — PLAN OF CARE-OVED
[facility] Emergency Department Plan of Care Note  Referral Source: Primary Care Provider                               Chief Complaint   Patient presents with    Shortness of Breath     In short patient presented with worsening shortness of breath at rest and with exertion over last few days.  She did report a trauma to her left leg a few weeks ago and felt some pain in this area.  She was evaluated in the internal medicine clinic with orders for lab work, with D-dimer returning elevated, troponin 0.012, BNP with creatinine 0.8 otherwise unremarkable, recent CBC 05/01/2025 with a hemoglobin of 14.1.  Subsequent CTA shows bilateral nonocclusive pulmonary emboli without CT evidence to suggest RV strain without pericardial effusion or additional finding.  Patient was re-evaluated in clinic today with an O2 sat ranging 95% and greater without shortness of breath with a heart rate 60-90 beats per minute.  Patient had no continued complaints, feels well.  Discussed with her PCP, Kana, with recommendations for additional ultrasound, echo, interventional cardiology follow-up, will start Eliquis at this time. PESI score is 79 placing her in group 2, appropriate for outpatient management.  As patient also appears well in the clinic today.      Recommendation: Treat in place                            Encounter Diagnosis   Name Primary?    Pulmonary embolism, unspecified chronicity, unspecified pulmonary embolism type, unspecified whether acute cor pulmonale present Yes

## 2025-05-06 NOTE — PROGRESS NOTES
Subjective:   Patient ID:  Maricarmen Becerril is a 69 y.o. female who presents for cardiac consult of Shortness of Breath      Referral by: Ryan Viramontes Md  [id number] [facility]  [place] [id number]     Reason for consult: chest pain      HPI  The patient came in today for cardiac consult of Shortness of Breath    5/6/25  Maricarmen Becerril is a 69 y.o. female pt with acute PE, HLD, elevated BP, Psoriatic athritis, hypothyroidism presents for CV eval of CP/SOB.     From note-     Shortness of Breath      In short patient presented with worsening shortness of breath at rest and with exertion over last few days.  She did report a trauma to her left leg a few weeks ago and felt some pain in this area.  She was evaluated in the internal medicine clinic with orders for lab work, with D-dimer returning elevated, troponin 0.012, BNP with creatinine 0.8 otherwise unremarkable, recent CBC 05/01/2025 with a hemoglobin of 14.1.  Subsequent CTA shows bilateral nonocclusive pulmonary emboli without CT evidence to suggest RV strain without pericardial effusion or additional finding.  Patient was re-evaluated in clinic today with an O2 sat ranging 95% and greater without shortness of breath with a heart rate 60-90 beats per minute.  Patient had no continued complaints, feels well.  Discussed with her PCP, Kana, with recommendations for additional ultrasound, echo, interventional cardiology follow-up, will start Eliquis at this time. PESI score is 79 placing her in group 2, appropriate for outpatient management.  As patient also appears well in the clinic today.     Pt had CTA - Bilateral nonocclusive pulmonary emboli   BP elevated 136/90.   HR 61.     ECG - NSR, nonsp T wave changes       No results found for this or any previous visit.      No results found for this or any previous visit.      No results found for this or any previous visit.      No cardiac monitor results found for the past 12  months         Past Medical History:   Diagnosis Date    Acid reflux     Allergic rhinitis     Allergy     Arthritis     Dry mouth     Heart murmur     Hypercholesteremia     Hypothyroidism     SECONDARY    Lung disease     mtx related    PONV (postoperative nausea and vomiting)     Positive SANA (antinuclear antibody)     Psoriatic arthritis     Seasonal allergies        Past Surgical History:   Procedure Laterality Date    COLONOSCOPY N/A 5/27/2021    Procedure: COLONOSCOPY;  Surgeon: Ross Deluna MD;  Location: [facility];  Service: Endoscopy;  Laterality: N/A;    DILATION AND CURETTAGE OF UTERUS      MANDIBLE FRACTURE SURGERY      OTHER SURGICAL HISTORY      uterine suspension    THYROIDECTOMY      TONSILLECTOMY         Social History[1]    Family History   Problem Relation Name Age of Onset    Parkinsonism Mother      Cataracts Mother      Gout Father Lance Christie     Hypothyroidism Father Lance Christie     Cataracts Father Lance Christie     Hypertension Father Lance Christie     Kidney disease Father Lance Christie     Cancer Maternal Grandfather Marc Bales         Lung cancer       Patient's Medications   New Prescriptions    APIXABAN (ELIQUIS DVT-PE TREAT 30D START) 5 MG (74 TABS) DSPK    For the first 7 days take two 5 mg tablets twice daily.  After 7 days take one 5 mg tablet twice daily.   Previous Medications    CALCIUM-VITAMIN D3 (OS-VONDA 500 + D3) 500 MG-5 MCG (200 UNIT) PER TABLET    Take 1 tablet by mouth 2 (two) times daily with meals.    GABAPENTIN (NEURONTIN) 300 MG CAPSULE    Take 1 capsule (300 mg total) by mouth 3 (three) times daily. for 20 days    IXEKIZUMAB (TALTZ AUTOINJECTOR) 80 MG/ML ATIN    Inject 80 mg into the skin every 28 days.    IXEKIZUMAB (TALTZ AUTOINJECTOR, 2 PACK,) 80 MG/ML ATIN    Inject 80 mg (1 pen) into the skin every other week on weeks 4, 6, 8, 10, 12. Then inject 80 mg (1 pen)  into the skin every 4 weeks thereafter.    IXEKIZUMAB (TALTZ AUTOINJECTOR, 3 PACK,) 80  "MG/ML ATIN    Inject 160 mg (2 pens) into the skin once, then inject 80 mg (1 pen) into the skin at week 2    LEVOTHYROXINE (SYNTHROID) 88 MCG TABLET    Take 1 tablet (88 mcg total) by mouth before breakfast.    OMEGA-3 FATTY ACIDS/FISH OIL (FISH OIL-OMEGA-3 FATTY ACIDS) 300-1,000 MG CAPSULE    Take by mouth once daily.    VALACYCLOVIR (VALTREX) 1000 MG TABLET    Take 1 tablet (1,000 mg total) by mouth 3 (three) times daily. for 14 days   Modified Medications    No medications on file   Discontinued Medications    No medications on file       Review of Systems   Constitutional:  Positive for malaise/fatigue.   HENT: Negative.     Eyes: Negative.    Respiratory:  Positive for shortness of breath.    Cardiovascular: Negative.    Gastrointestinal: Negative.    Genitourinary: Negative.    Musculoskeletal: Negative.    Skin: Negative.    Neurological: Negative.    Endo/Heme/Allergies: Negative.    Psychiatric/Behavioral: Negative.     All 12 systems otherwise negative.      Wt Readings from Last 3 Encounters:   05/06/25 71.8 kg (158 lb 4.6 oz)   05/06/25 71.8 kg (158 lb 4.6 oz)   05/05/25 71.6 kg (157 lb 13.6 oz)     Temp Readings from Last 3 Encounters:   05/05/25 97.8 °F (36.6 °C) (Tympanic)   11/27/24 97.6 °F (36.4 °C) (Tympanic)   03/04/24 (!) 100.8 °F (38.2 °C) (Tympanic)     BP Readings from Last 3 Encounters:   05/06/25 (!) 136/90   05/05/25 106/82   11/27/24 130/86     Pulse Readings from Last 3 Encounters:   05/06/25 61   05/05/25 76   11/27/24 73       BP (!) 136/90 (BP Location: Right arm)   Pulse 61   Ht 5' 7" (1.702 m)   Wt 71.8 kg (158 lb 4.6 oz)   SpO2 96%   BMI 24.79 kg/m²     Objective:   Physical Exam  Vitals and nursing note reviewed.   Constitutional:       General: She is not in acute distress.     Appearance: She is well-developed. She is not diaphoretic.   HENT:      Head: Normocephalic and atraumatic.      Nose: Nose normal.   Eyes:      General: No scleral icterus.     Conjunctiva/sclera: " Conjunctivae normal.   Neck:      Thyroid: No thyromegaly.      Vascular: No JVD.   Cardiovascular:      Rate and Rhythm: Normal rate and regular rhythm.      Heart sounds: S1 normal and S2 normal. Murmur heard.      No friction rub. No gallop. No S3 or S4 sounds.   Pulmonary:      Effort: Pulmonary effort is normal. No respiratory distress.      Breath sounds: Normal breath sounds. No stridor. No wheezing or rales.   Chest:      Chest wall: No tenderness.   Abdominal:      General: Bowel sounds are normal. There is no distension.      Palpations: Abdomen is soft. There is no mass.      Tenderness: There is no abdominal tenderness. There is no rebound.   Genitourinary:     Comments: Deferred  Musculoskeletal:         General: No tenderness or deformity. Normal range of motion.      Cervical back: Normal range of motion and neck supple.   Lymphadenopathy:      Cervical: No cervical adenopathy.   Skin:     General: Skin is warm and dry.      Coloration: Skin is not pale.      Findings: No erythema or rash.   Neurological:      Mental Status: She is alert and oriented to person, place, and time.      Motor: No abnormal muscle tone.      Coordination: Coordination normal.   Psychiatric:         Behavior: Behavior normal.         Thought Content: Thought content normal.         Judgment: Judgment normal.         Lab Results   Component Value Date     05/05/2025     01/31/2025    K 5.0 05/05/2025    K 5.1 01/31/2025     05/05/2025     01/31/2025    CO2 25 05/05/2025    CO2 25 01/31/2025    BUN 15 05/05/2025    CREATININE 0.8 05/05/2025     (H) 05/05/2025    GLU 79 01/31/2025    HGBA1C 5.5 01/31/2025    AST 18 05/01/2025    AST 18 01/31/2025    ALT 16 05/01/2025    ALT 22 01/31/2025    ALBUMIN 4.1 05/01/2025    ALBUMIN 3.7 01/31/2025    PROT 8.5 (H) 05/01/2025    PROT 8.6 (H) 01/31/2025    BILITOT 0.5 05/01/2025    BILITOT 0.5 01/31/2025    WBC 3.97 05/01/2025    HGB 14.1 05/01/2025    HGB  "13.4 10/31/2024    HCT 44.4 05/01/2025    HCT 41.9 10/31/2024    MCV 92 05/01/2025    MCV 94 10/31/2024     05/01/2025     10/31/2024    TSH 2.489 01/31/2025    CHOL 252 (H) 01/31/2025    HDL 66 01/31/2025    LDLCALC 172.4 (H) 01/31/2025    LDLCALC 166 (H) 04/11/2018    TRIG 68 01/31/2025         No results found for: "BNP", "INR"       Assessment:      1. Pulmonary embolism, unspecified chronicity, unspecified pulmonary embolism type, unspecified whether acute cor pulmonale present    2. Chest pain, unspecified type    3. Localized edema    4. Other form of dyspnea    5. Psoriatic arthritis    6. Acquired hypothyroidism        Plan:     Chest pain, recent PE  - start Eliquis high dose - 10mg BID x 7 days and then 5 mg BID  - order LE u/s and ECHO    2. HLD  - needs further tx    3. Psoriatric arthritis  - cont tx     4. Hypothyroidism  - cont Synthroid     IF severe CP/SOB needs ER eval and admission     Thank you for allowing me to participate in this patient's care. Please do not hesitate to contact me with any questions or concerns. Consult note has been forwarded to the referral physician.          [1]   Social History  Tobacco Use    Smoking status: Never    Smokeless tobacco: Never   Substance Use Topics    Alcohol use: No    Drug use: No     "

## 2025-05-07 ENCOUNTER — HOSPITAL ENCOUNTER (OUTPATIENT)
Dept: CARDIOLOGY | Facility: HOSPITAL | Age: 70
Discharge: HOME | End: 2025-05-07
Attending: INTERNAL MEDICINE
Payer: COMMERCIAL

## 2025-05-07 VITALS
BODY MASS INDEX: 24.8 KG/M2 | DIASTOLIC BLOOD PRESSURE: 90 MMHG | SYSTOLIC BLOOD PRESSURE: 136 MMHG | HEIGHT: 67 IN | DIASTOLIC BLOOD PRESSURE: 90 MMHG | WEIGHT: 158 LBS | WEIGHT: 158 LBS | BODY MASS INDEX: 24.8 KG/M2 | SYSTOLIC BLOOD PRESSURE: 136 MMHG | HEIGHT: 67 IN

## 2025-05-07 DIAGNOSIS — I26.99 PULMONARY EMBOLISM, UNSPECIFIED CHRONICITY, UNSPECIFIED PULMONARY EMBOLISM TYPE, UNSPECIFIED WHETHER ACUTE COR PULMONALE PRESENT: ICD-10-CM

## 2025-05-07 DIAGNOSIS — R60.0 LOCALIZED EDEMA: ICD-10-CM

## 2025-05-07 DIAGNOSIS — R07.9 CHEST PAIN, UNSPECIFIED TYPE: ICD-10-CM

## 2025-05-07 LAB
AORTIC ROOT ANNULUS: 3.3 CM
ASCENDING AORTA: 3 CM
AV INDEX (PROSTH): 0.92
AV MEAN GRADIENT: 3 MMHG
AV PEAK GRADIENT: 5 MMHG
AV VALVE AREA BY VELOCITY RATIO: 2.6 CM²
AV VALVE AREA: 2.9 CM²
AV VELOCITY RATIO: 0.82
BSA FOR ECHO PROCEDURE: 1.84 M2
CV ECHO LV RWT: 0.56 CM
DOP CALC AO PEAK VEL: 1.1 M/S
DOP CALC AO VTI: 21.8 CM
DOP CALC LVOT AREA: 3.1 CM2
DOP CALC LVOT DIAMETER: 2 CM
DOP CALC LVOT PEAK VEL: 0.9 M/S
DOP CALC LVOT STROKE VOLUME: 62.8 CM3
DOP CALC RVOT PEAK VEL: 0.71 M/S
DOP CALC RVOT VTI: 15.4 CM
DOP CALCLVOT PEAK VEL VTI: 20 CM
E WAVE DECELERATION TIME: 303 MSEC
E/A RATIO: 0.95
E/E' RATIO: 7 M/S
ECHO LV POSTERIOR WALL: 1 CM (ref 0.6–1.1)
EJECTION FRACTION: 60 %
FRACTIONAL SHORTENING: 38.9 % (ref 28–44)
INTERVENTRICULAR SEPTUM: 1.1 CM (ref 0.6–1.1)
IVC DIAMETER: 1.2 CM
IVRT: 77 MSEC
LA MAJOR: 3.3 CM
LA MINOR: 4.1 CM
LA WIDTH: 3.1 CM
LEFT ATRIUM AREA SYSTOLIC (APICAL 2 CHAMBER): 8.1 CM2
LEFT ATRIUM AREA SYSTOLIC (APICAL 4 CHAMBER): 10.84 CM2
LEFT ATRIUM SIZE: 2.2 CM
LEFT ATRIUM VOLUME INDEX MOD: 14 ML/M2
LEFT ATRIUM VOLUME INDEX: 12 ML/M2
LEFT ATRIUM VOLUME MOD: 25 ML
LEFT ATRIUM VOLUME: 21 CM3
LEFT INTERNAL DIMENSION IN SYSTOLE: 2.2 CM (ref 2.1–4)
LEFT VENTRICLE DIASTOLIC VOLUME INDEX: 28.96 ML/M2
LEFT VENTRICLE DIASTOLIC VOLUME: 53 ML
LEFT VENTRICLE END SYSTOLIC VOLUME APICAL 2 CHAMBER: 18.24 ML
LEFT VENTRICLE END SYSTOLIC VOLUME APICAL 4 CHAMBER: 27.54 ML
LEFT VENTRICLE MASS INDEX: 63.3 G/M2
LEFT VENTRICLE SYSTOLIC VOLUME INDEX: 8.7 ML/M2
LEFT VENTRICLE SYSTOLIC VOLUME: 16 ML
LEFT VENTRICULAR INTERNAL DIMENSION IN DIASTOLE: 3.6 CM (ref 3.5–6)
LEFT VENTRICULAR MASS: 115.9 G
LV LATERAL E/E' RATIO: 6 M/S
LV SEPTAL E/E' RATIO: 9 M/S
LVED V (TEICH): 52.86 ML
LVES V (TEICH): 15.6 ML
LVOT MG: 2.01 MMHG
LVOT MV: 0.69 CM/S
MV PEAK A VEL: 0.76 M/S
MV PEAK E VEL: 0.72 M/S
MV STENOSIS PRESSURE HALF TIME: 88 MS
MV VALVE AREA P 1/2 METHOD: 2.5 CM2
OHS CV RV/LV RATIO: 0.81 CM
PISA MRMAX VEL: 5.06 M/S
PISA TR MAX VEL: 2.9 M/S
PV MEAN GRADIENT: 1 MMHG
RA MAJOR: 3.36 CM
RA PRESSURE ESTIMATED: 3 MMHG
RA WIDTH: 1.41 CM
RIGHT VENTRICLE DIASTOLIC BASEL DIMENSION: 2.9 CM
RV TB RVSP: 6 MMHG
SINUS: 3.02 CM
STJ: 3 CM
TDI LATERAL: 0.12 M/S
TDI SEPTAL: 0.08 M/S
TDI: 0.1 M/S
TR MAX PG: 34 MMHG
TRICUSPID ANNULAR PLANE SYSTOLIC EXCURSION: 2 CM
TV REST PULMONARY ARTERY PRESSURE: 37 MMHG
Z-SCORE OF LEFT VENTRICULAR DIMENSION IN END DIASTOLE: -3.43
Z-SCORE OF LEFT VENTRICULAR DIMENSION IN END SYSTOLE: -2.79

## 2025-05-07 PROCEDURE — 93970 EXTREMITY STUDY: CPT | Mod: PO

## 2025-05-07 PROCEDURE — 93306 TTE W/DOPPLER COMPLETE: CPT | Mod: PO

## 2025-05-07 PROCEDURE — 93306 TTE W/DOPPLER COMPLETE: CPT | Mod: 26,,, | Performed by: INTERNAL MEDICINE

## 2025-05-07 PROCEDURE — 93970 EXTREMITY STUDY: CPT | Mod: 26,,, | Performed by: INTERNAL MEDICINE

## 2025-05-08 ENCOUNTER — RESULTS FOLLOW-UP (OUTPATIENT)
Dept: CARDIOLOGY | Facility: CLINIC | Age: 70
End: 2025-05-08

## 2025-05-08 ENCOUNTER — TELEPHONE (OUTPATIENT)
Dept: CARDIOLOGY | Facility: CLINIC | Age: 70
End: 2025-05-08
Payer: COMMERCIAL

## 2025-05-08 ENCOUNTER — OFFICE VISIT (OUTPATIENT)
Dept: RHEUMATOLOGY | Facility: CLINIC | Age: 70
End: 2025-05-08
Payer: COMMERCIAL

## 2025-05-08 VITALS
BODY MASS INDEX: 24.53 KG/M2 | HEIGHT: 67 IN | HEART RATE: 68 BPM | DIASTOLIC BLOOD PRESSURE: 81 MMHG | WEIGHT: 156.31 LBS | SYSTOLIC BLOOD PRESSURE: 121 MMHG

## 2025-05-08 DIAGNOSIS — D84.821 DRUG-INDUCED IMMUNODEFICIENCY: ICD-10-CM

## 2025-05-08 DIAGNOSIS — L40.50 PSORIATIC ARTHRITIS: ICD-10-CM

## 2025-05-08 DIAGNOSIS — Z79.899 DRUG-INDUCED IMMUNODEFICIENCY: ICD-10-CM

## 2025-05-08 DIAGNOSIS — Z51.81 ENCOUNTER FOR MEDICATION MONITORING: ICD-10-CM

## 2025-05-08 DIAGNOSIS — L40.0 PLAQUE PSORIASIS: Primary | ICD-10-CM

## 2025-05-08 PROCEDURE — G2211 COMPLEX E/M VISIT ADD ON: HCPCS | Mod: S$GLB,,, | Performed by: INTERNAL MEDICINE

## 2025-05-08 PROCEDURE — 1101F PT FALLS ASSESS-DOCD LE1/YR: CPT | Mod: CPTII,S$GLB,, | Performed by: INTERNAL MEDICINE

## 2025-05-08 PROCEDURE — 99215 OFFICE O/P EST HI 40 MIN: CPT | Mod: S$GLB,,, | Performed by: INTERNAL MEDICINE

## 2025-05-08 PROCEDURE — 1126F AMNT PAIN NOTED NONE PRSNT: CPT | Mod: CPTII,S$GLB,, | Performed by: INTERNAL MEDICINE

## 2025-05-08 PROCEDURE — 1160F RVW MEDS BY RX/DR IN RCRD: CPT | Mod: CPTII,S$GLB,, | Performed by: INTERNAL MEDICINE

## 2025-05-08 PROCEDURE — 3074F SYST BP LT 130 MM HG: CPT | Mod: CPTII,S$GLB,, | Performed by: INTERNAL MEDICINE

## 2025-05-08 PROCEDURE — 3008F BODY MASS INDEX DOCD: CPT | Mod: CPTII,S$GLB,, | Performed by: INTERNAL MEDICINE

## 2025-05-08 PROCEDURE — 3044F HG A1C LEVEL LT 7.0%: CPT | Mod: CPTII,S$GLB,, | Performed by: INTERNAL MEDICINE

## 2025-05-08 PROCEDURE — 1159F MED LIST DOCD IN RCRD: CPT | Mod: CPTII,S$GLB,, | Performed by: INTERNAL MEDICINE

## 2025-05-08 PROCEDURE — 3288F FALL RISK ASSESSMENT DOCD: CPT | Mod: CPTII,S$GLB,, | Performed by: INTERNAL MEDICINE

## 2025-05-08 PROCEDURE — 99999 PR PBB SHADOW E&M-EST. PATIENT-LVL III: CPT | Mod: PBBFAC,,, | Performed by: INTERNAL MEDICINE

## 2025-05-08 PROCEDURE — 3079F DIAST BP 80-89 MM HG: CPT | Mod: CPTII,S$GLB,, | Performed by: INTERNAL MEDICINE

## 2025-05-08 NOTE — PROGRESS NOTES
RHEUMATOLOGY CLINIC FOLLOW UP VISIT  Chief complaints, HPI, ROS, EXAM, Assessment & Plans:-  Maricarmen Ho a 69 y.o. pleasant female comes in for follow-up for plaque psoriasis and psoriatic arthritis.  Significant improvement on Taltz.  No significant rash or arthritis.  Recently diagnosed with pulmonary embolism secondary to DVT of lower extremity subsequent to knee injury.  On Eliquis.  Rheumatological review of system negative otherwise.  Exam shows no active psoriasis or psoriatic arthritis.  No synovitis, dactylitis, enthesitis or effusion.    1. Plaque psoriasis    2. Psoriatic arthritis    3. Drug-induced immunodeficiency    4. Encounter for medication monitoring      Problem List Items Addressed This Visit       Drug-induced immunodeficiency    Encounter for medication monitoring    Plaque psoriasis - Primary    Psoriatic arthritis      Latest Reference Range & Units 05/01/25 09:42   WBC 3.90 - 12.70 K/uL 3.97   RBC 4.00 - 5.40 M/uL 4.84   Hemoglobin 12.0 - 16.0 gm/dL 14.1   Hematocrit 37.0 - 48.5 % 44.4   MCV 82 - 98 fL 92   MCH 27.0 - 31.0 pg 29.1   MCHC 32.0 - 36.0 g/dL 31.8 (L)   RDW 11.5 - 14.5 % 14.5   Platelet Count 150 - 450 K/uL 258   MPV 9.2 - 12.9 fL 9.2   Neut % 38 - 73 % 51.6   Lymph % 18 - 48 % 33.5   Mono % 4 - 15 % 9.3   Eos % <=8 % 4.3   Basophil % <=1.9 % 1.3   Immature Granulocytes 0.0 - 0.5 % 0.0   Gran # (ANC) 1.8 - 7.7 K/uL 2.05   Lymph # 1 - 4.8 K/uL 1.33   Mono # 0.3 - 1 K/uL 0.37   Eos # <=0.5 K/uL 0.17   Baso # <=0.2 K/uL 0.05   Immature Grans (Abs) 0.00 - 0.04 K/uL 0.00   nRBC <=0 /100 WBC 0   Sed Rate <=36 mm/hr <2   Sodium 136 - 145 mmol/L 139   Potassium 3.5 - 5.1 mmol/L 5.9 (H)   Chloride 95 - 110 mmol/L 105   CO2 23 - 29 mmol/L 25   Anion Gap 8 - 16 mmol/L 9   BUN 8 - 23 mg/dL 18   Creatinine 0.5 - 1.4 mg/dL 0.9   eGFR >60 mL/min/1.73/m2 >60   Glucose 70 - 110 mg/dL 121 (H)   Calcium 8.7 - 10.5 mg/dL 10.3   ALP 40 - 150 unit/L 66   PROTEIN TOTAL 6.0 - 8.4 gm/dL  8.5 (H)   Albumin 3.5 - 5.2 g/dL 4.1   BILIRUBIN TOTAL 0.1 - 1.0 mg/dL 0.5   AST 11 - 45 unit/L 18   ALT 10 - 44 unit/L 16   CRP <=8.2 mg/L 5.9   (L): Data is abnormally low  (H): Data is abnormally high    Excellent improvement of both plaque psoriasis and psoriatic arthritis on Taltz.  Failed Rinvoq.  Drug induced immunodeficiency due to use of immunosuppressive drugs. Monitor carefully for infections. Advised to get immediate medical care if any infection. Also advised strict adherence to age appropriate vaccinations and cancer screenings with PCP.  Hold Taltz if any infection  Safety labs shows normal WBC, hemoglobin, platelet, kidney functions and liver functions.  Normal inflammation markers.  Pulmonary embolism secondary to DVT subsequent to need trauma with soft tissue injury.  Continue Eliquis and follow-up with Cardiology.

## 2025-05-08 NOTE — TELEPHONE ENCOUNTER
----- Message from Miguelito Chadwick MD sent at 5/8/2025  8:03 AM CDT -----  Please contact the patient and let them know that their results of ultrasound reveal a clot in left popliteal vein - DVT; continue current medications as discussed, if feeling any chest   pressure/heaviness/worsening breathing issues issues recommend ER eval and may need admission. Call office for refill for Eliquis once the starter pack is running low.   ----- Message -----  From: Ellen Cerna MD  Sent: 5/7/2025   9:41 PM CDT  To: Miguelito Chadwick MD

## 2025-05-08 NOTE — TELEPHONE ENCOUNTER
The patient has been notified of the results and did not have any questions at this time . The patient verbalized understanding.                Please contact the patient and let them know that their results of ultrasound reveal a clot in left popliteal vein - DVT; continue current medications as discussed, if feeling any chest pressure/heaviness/worsening breathing issues issues recommend ER eval and may need admission. Call office for refill for Eliquis once the starter pack is running low.

## 2025-05-28 DIAGNOSIS — I82.4Y9 DEEP VEIN THROMBOSIS (DVT) OF PROXIMAL LOWER EXTREMITY, UNSPECIFIED CHRONICITY, UNSPECIFIED LATERALITY: ICD-10-CM

## 2025-05-28 DIAGNOSIS — I26.99 PULMONARY EMBOLISM, UNSPECIFIED CHRONICITY, UNSPECIFIED PULMONARY EMBOLISM TYPE, UNSPECIFIED WHETHER ACUTE COR PULMONALE PRESENT: ICD-10-CM

## 2025-05-28 DIAGNOSIS — I26.99 PULMONARY EMBOLISM, UNSPECIFIED CHRONICITY, UNSPECIFIED PULMONARY EMBOLISM TYPE, UNSPECIFIED WHETHER ACUTE COR PULMONALE PRESENT: Primary | ICD-10-CM

## 2025-05-28 RX ORDER — APIXABAN 5 MG (74)
KIT ORAL
Qty: 74 TABLET | Refills: 0 | OUTPATIENT
Start: 2025-05-28

## 2025-06-25 ENCOUNTER — HOSPITAL ENCOUNTER (OUTPATIENT)
Dept: RADIOLOGY | Facility: HOSPITAL | Age: 70
Discharge: HOME | End: 2025-06-25
Attending: PHYSICIAN ASSISTANT
Payer: COMMERCIAL

## 2025-06-25 DIAGNOSIS — Z78.0 MENOPAUSE: ICD-10-CM

## 2025-06-25 DIAGNOSIS — E78.2 MIXED HYPERLIPIDEMIA: ICD-10-CM

## 2025-06-25 DIAGNOSIS — Z13.6 ENCOUNTER FOR SCREENING FOR CARDIOVASCULAR DISORDERS: ICD-10-CM

## 2025-06-25 PROCEDURE — 75571 CT HRT W/O DYE W/CA TEST: CPT | Mod: 26,,, | Performed by: STUDENT IN AN ORGANIZED HEALTH CARE EDUCATION/TRAINING PROGRAM

## 2025-06-25 PROCEDURE — 75571 CT HRT W/O DYE W/CA TEST: CPT | Mod: TC

## 2025-07-14 ENCOUNTER — PATIENT MESSAGE (OUTPATIENT)
Dept: ADMINISTRATIVE | Facility: OTHER | Age: 70
End: 2025-07-14
Payer: MEDICARE

## 2025-07-22 DIAGNOSIS — E03.9 HYPOTHYROIDISM, UNSPECIFIED TYPE: ICD-10-CM

## 2025-07-23 NOTE — TELEPHONE ENCOUNTER
No care due was identified.  [facility] Embedded Care Due Messages. Reference number: [id number].   7/22/2025 10:04:28 PM CDT

## 2025-07-23 NOTE — TELEPHONE ENCOUNTER
Refill Routing Note   Medication(s) are not appropriate for processing by [facility] for the following reason(s):        No active prescription written by provider    [facility] action(s):  Defer      Medication Therapy Plan: Last RX written by provider 5/1/2023      Appointments  past 12m or future 3m with PCP    Date Provider   Last Visit   9/9/2024 Ryan Viramontes MD   Next Visit   Visit date not found Ryan Viramontes MD   ED visits in past 90 days: 0        Note composed:10:24 PM 07/22/2025

## 2025-07-24 RX ORDER — LEVOTHYROXINE SODIUM 88 UG/1
88 TABLET ORAL
Qty: 90 TABLET | Refills: 1 | Status: SHIPPED | OUTPATIENT
Start: 2025-07-24 | End: 2026-07-24

## 2025-08-07 ENCOUNTER — OFFICE VISIT (OUTPATIENT)
Dept: CARDIOLOGY | Facility: CLINIC | Age: 70
End: 2025-08-07
Payer: COMMERCIAL

## 2025-08-07 VITALS
HEIGHT: 67 IN | SYSTOLIC BLOOD PRESSURE: 122 MMHG | WEIGHT: 160.69 LBS | OXYGEN SATURATION: 97 % | BODY MASS INDEX: 25.22 KG/M2 | DIASTOLIC BLOOD PRESSURE: 82 MMHG | HEART RATE: 57 BPM

## 2025-08-07 DIAGNOSIS — I26.99 PULMONARY EMBOLISM, UNSPECIFIED CHRONICITY, UNSPECIFIED PULMONARY EMBOLISM TYPE, UNSPECIFIED WHETHER ACUTE COR PULMONALE PRESENT: Primary | ICD-10-CM

## 2025-08-07 DIAGNOSIS — I27.20 PULMONARY HYPERTENSION: ICD-10-CM

## 2025-08-07 DIAGNOSIS — R07.9 CHEST PAIN, UNSPECIFIED TYPE: ICD-10-CM

## 2025-08-07 DIAGNOSIS — R60.0 LOCALIZED EDEMA: ICD-10-CM

## 2025-08-07 DIAGNOSIS — R06.09 OTHER FORM OF DYSPNEA: ICD-10-CM

## 2025-08-07 DIAGNOSIS — R00.2 HEART PALPITATIONS: ICD-10-CM

## 2025-08-07 DIAGNOSIS — E03.9 ACQUIRED HYPOTHYROIDISM: ICD-10-CM

## 2025-08-07 DIAGNOSIS — I82.4Y9 DEEP VEIN THROMBOSIS (DVT) OF PROXIMAL LOWER EXTREMITY, UNSPECIFIED CHRONICITY, UNSPECIFIED LATERALITY: ICD-10-CM

## 2025-08-07 DIAGNOSIS — R06.02 SHORTNESS OF BREATH: ICD-10-CM

## 2025-08-07 DIAGNOSIS — L40.50 PSORIATIC ARTHRITIS: ICD-10-CM

## 2025-08-07 PROCEDURE — 1159F MED LIST DOCD IN RCRD: CPT | Mod: CPTII,S$GLB,, | Performed by: INTERNAL MEDICINE

## 2025-08-07 PROCEDURE — 3008F BODY MASS INDEX DOCD: CPT | Mod: CPTII,S$GLB,, | Performed by: INTERNAL MEDICINE

## 2025-08-07 PROCEDURE — 99214 OFFICE O/P EST MOD 30 MIN: CPT | Mod: S$GLB,,, | Performed by: INTERNAL MEDICINE

## 2025-08-07 PROCEDURE — 3044F HG A1C LEVEL LT 7.0%: CPT | Mod: CPTII,S$GLB,, | Performed by: INTERNAL MEDICINE

## 2025-08-07 PROCEDURE — 1160F RVW MEDS BY RX/DR IN RCRD: CPT | Mod: CPTII,S$GLB,, | Performed by: INTERNAL MEDICINE

## 2025-08-07 PROCEDURE — 3288F FALL RISK ASSESSMENT DOCD: CPT | Mod: CPTII,S$GLB,, | Performed by: INTERNAL MEDICINE

## 2025-08-07 PROCEDURE — G2211 COMPLEX E/M VISIT ADD ON: HCPCS | Mod: S$GLB,,, | Performed by: INTERNAL MEDICINE

## 2025-08-07 PROCEDURE — 1101F PT FALLS ASSESS-DOCD LE1/YR: CPT | Mod: CPTII,S$GLB,, | Performed by: INTERNAL MEDICINE

## 2025-08-07 PROCEDURE — 99999 PR PBB SHADOW E&M-EST. PATIENT-LVL III: CPT | Mod: PBBFAC,,, | Performed by: INTERNAL MEDICINE

## 2025-08-07 PROCEDURE — 3074F SYST BP LT 130 MM HG: CPT | Mod: CPTII,S$GLB,, | Performed by: INTERNAL MEDICINE

## 2025-08-07 PROCEDURE — 1126F AMNT PAIN NOTED NONE PRSNT: CPT | Mod: CPTII,S$GLB,, | Performed by: INTERNAL MEDICINE

## 2025-08-07 PROCEDURE — 3079F DIAST BP 80-89 MM HG: CPT | Mod: CPTII,S$GLB,, | Performed by: INTERNAL MEDICINE

## 2025-08-07 NOTE — PROGRESS NOTES
Subjective:   Patient ID:  Maricarmen Becerril is a 70 y.o. female who presents for cardiac consult of No chief complaint on file.      Referral by: No referring provider defined for this encounter.     Reason for consult: chest pain      HPI  The patient came in today for cardiac consult of No chief complaint on file.      Maricarmen Becerril is a 70 y.o. female pt with h/o PE, DVT , HLD, elevated BP, Psoriatic athritis, hypothyroidism presents for follow up CV eval.      5/6/25  From note-     Shortness of Breath      In short patient presented with worsening shortness of breath at rest and with exertion over last few days.  She did report a trauma to her left leg a few weeks ago and felt some pain in this area.  She was evaluated in the internal medicine clinic with orders for lab work, with D-dimer returning elevated, troponin 0.012, BNP with creatinine 0.8 otherwise unremarkable, recent CBC 05/01/2025 with a hemoglobin of 14.1.  Subsequent CTA shows bilateral nonocclusive pulmonary emboli without CT evidence to suggest RV strain without pericardial effusion or additional finding.  Patient was re-evaluated in clinic today with an O2 sat ranging 95% and greater without shortness of breath with a heart rate 60-90 beats per minute.  Patient had no continued complaints, feels well.  Discussed with her PCP, Kana, with recommendations for additional ultrasound, echo, interventional cardiology follow-up, will start Eliquis at this time. PESI score is 79 placing her in group 2, appropriate for outpatient management.  As patient also appears well in the clinic today.    Pt had CTA - Bilateral nonocclusive pulmonary emboli   BP elevated 136/90.   HR 61.   ECG - NSR, nonsp T wave changes     8/7/25  ECHO 5/2025 with normal bi V function, mild MR, PASP 37 mmHg.   LE u/s with acute L pop DVT - told to continue Eliquis.   BP and HR stable. BMI 25 - 160 lbs       No cardiac monitor results found for the  past 12 months     Interpretation Summary  Show Result Comparison     There is no evidence of a right lower extremity DVT.    The right superficial femoral middle vein is normal.    The contralateral (left) common femoral vein is patent.    The left superficial femoral middle vein is normal.    The left popliteal vein is abnormal.acute left popliteal dvt.    Results for orders placed during the hospital encounter of 05/07/25    Echo    Interpretation Summary    Left Ventricle: The left ventricle is normal in size. Mildly increased wall thickness. There is concentric remodeling. There is normal systolic function with a visually estimated ejection fraction of 60 - 65%. Ejection fraction is approximately 60%. There is normal diastolic function.    Right Ventricle: The right ventricle is normal in size. Wall thickness is normal. Systolic function is normal.    Tricuspid Valve: There is mild regurgitation. There is mild pulmonary hypertension.    Pulmonary Artery: The estimated pulmonary artery systolic pressure is 37 mmHg.    IVC/SVC: Normal venous pressure at 3 mmHg.      Past Medical History:   Diagnosis Date    Acid reflux     Allergic rhinitis     Allergy     Arthritis     Dry mouth     Heart murmur     Hypercholesteremia     Hypothyroidism     SECONDARY    Lung disease     mtx related    PONV (postoperative nausea and vomiting)     Positive SANA (antinuclear antibody)     Psoriatic arthritis     Seasonal allergies        Past Surgical History:   Procedure Laterality Date    COLONOSCOPY N/A 5/27/2021    Procedure: COLONOSCOPY;  Surgeon: Ross Deluna MD;  Location: [facility];  Service: Endoscopy;  Laterality: N/A;    DILATION AND CURETTAGE OF UTERUS      MANDIBLE FRACTURE SURGERY      OTHER SURGICAL HISTORY      uterine suspension    THYROIDECTOMY      TONSILLECTOMY         Social History[1]    Family History   Problem Relation Name Age of Onset    Parkinsonism Mother      Cataracts Mother      Gout Father Lance  Pratik     Hypothyroidism Father Lance Christie     Cataracts Father Lance Christie     Hypertension Father Lance Christie     Kidney disease Father Lance Christie     Cancer Maternal Grandfather Marc Bales         Lung cancer       Patient's Medications   New Prescriptions    No medications on file   Previous Medications    APIXABAN (ELIQUIS) 5 MG TAB    Take 1 tablet (5 mg total) by mouth 2 (two) times daily.    CALCIUM-VITAMIN D3 (OS-VONDA 500 + D3) 500 MG-5 MCG (200 UNIT) PER TABLET    Take 1 tablet by mouth 2 (two) times daily with meals.    GABAPENTIN (NEURONTIN) 300 MG CAPSULE    Take 1 capsule (300 mg total) by mouth 3 (three) times daily. for 20 days    IXEKIZUMAB (TALTZ AUTOINJECTOR) 80 MG/ML ATIN    Inject 80 mg into the skin every 28 days.    LEVOTHYROXINE (SYNTHROID) 88 MCG TABLET    Take 1 tablet (88 mcg total) by mouth before breakfast.    OMEGA-3 FATTY ACIDS/FISH OIL (FISH OIL-OMEGA-3 FATTY ACIDS) 300-1,000 MG CAPSULE    Take by mouth once daily.    VALACYCLOVIR (VALTREX) 1000 MG TABLET    Take 1 tablet (1,000 mg total) by mouth 3 (three) times daily. for 14 days   Modified Medications    No medications on file   Discontinued Medications    No medications on file       Review of Systems   Constitutional:  Positive for malaise/fatigue.   HENT: Negative.     Eyes: Negative.    Respiratory:  Positive for shortness of breath.    Cardiovascular: Negative.    Gastrointestinal: Negative.    Genitourinary: Negative.    Musculoskeletal: Negative.    Skin: Negative.    Neurological: Negative.    Endo/Heme/Allergies: Negative.    Psychiatric/Behavioral: Negative.     All 12 systems otherwise negative.      Wt Readings from Last 3 Encounters:   08/07/25 72.9 kg (160 lb 11.5 oz)   05/08/25 70.9 kg (156 lb 4.9 oz)   05/07/25 71.7 kg (158 lb)     Temp Readings from Last 3 Encounters:   05/05/25 97.8 °F (36.6 °C) (Tympanic)   11/27/24 97.6 °F (36.4 °C) (Tympanic)   03/04/24 (!) 100.8 °F (38.2 °C) (Tympanic)     BP  "Readings from Last 3 Encounters:   08/07/25 122/82   05/08/25 121/81   05/07/25 (!) 136/90     Pulse Readings from Last 3 Encounters:   08/07/25 (!) 57   05/08/25 68   05/06/25 61       /82 (BP Location: Left arm, Patient Position: Sitting)   Pulse (!) 57   Ht 5' 7" (1.702 m)   Wt 72.9 kg (160 lb 11.5 oz)   SpO2 97%   BMI 25.17 kg/m²     Objective:   Physical Exam  Vitals and nursing note reviewed.   Constitutional:       General: She is not in acute distress.     Appearance: She is well-developed. She is not diaphoretic.   HENT:      Head: Normocephalic and atraumatic.      Nose: Nose normal.   Eyes:      General: No scleral icterus.     Conjunctiva/sclera: Conjunctivae normal.   Neck:      Thyroid: No thyromegaly.      Vascular: No JVD.   Cardiovascular:      Rate and Rhythm: Normal rate and regular rhythm.      Heart sounds: S1 normal and S2 normal. Murmur heard.      No friction rub. No gallop. No S3 or S4 sounds.   Pulmonary:      Effort: Pulmonary effort is normal. No respiratory distress.      Breath sounds: Normal breath sounds. No stridor. No wheezing or rales.   Chest:      Chest wall: No tenderness.   Abdominal:      General: Bowel sounds are normal. There is no distension.      Palpations: Abdomen is soft. There is no mass.      Tenderness: There is no abdominal tenderness. There is no rebound.   Genitourinary:     Comments: Deferred  Musculoskeletal:         General: No tenderness or deformity. Normal range of motion.      Cervical back: Normal range of motion and neck supple.   Lymphadenopathy:      Cervical: No cervical adenopathy.   Skin:     General: Skin is warm and dry.      Coloration: Skin is not pale.      Findings: No erythema or rash.   Neurological:      Mental Status: She is alert and oriented to person, place, and time.      Motor: No abnormal muscle tone.      Coordination: Coordination normal.   Psychiatric:         Behavior: Behavior normal.         Thought Content: Thought " "content normal.         Judgment: Judgment normal.         Lab Results   Component Value Date     05/05/2025     01/31/2025    K 5.0 05/05/2025    K 5.1 01/31/2025     05/05/2025     01/31/2025    CO2 25 05/05/2025    CO2 25 01/31/2025    BUN 15 05/05/2025    CREATININE 0.8 05/05/2025     (H) 05/05/2025    GLU 79 01/31/2025    HGBA1C 5.5 01/31/2025    AST 18 05/01/2025    AST 18 01/31/2025    ALT 16 05/01/2025    ALT 22 01/31/2025    ALBUMIN 4.1 05/01/2025    ALBUMIN 3.7 01/31/2025    PROT 8.5 (H) 05/01/2025    PROT 8.6 (H) 01/31/2025    BILITOT 0.5 05/01/2025    BILITOT 0.5 01/31/2025    WBC 3.97 05/01/2025    HGB 14.1 05/01/2025    HGB 13.4 10/31/2024    HCT 44.4 05/01/2025    HCT 41.9 10/31/2024    MCV 92 05/01/2025    MCV 94 10/31/2024     05/01/2025     10/31/2024    TSH 2.489 01/31/2025    CHOL 252 (H) 01/31/2025    HDL 66 01/31/2025    LDLCALC 172.4 (H) 01/31/2025    LDLCALC 166 (H) 04/11/2018    TRIG 68 01/31/2025         No results found for: "BNP", "INR"       Assessment:      1. Pulmonary embolism, unspecified chronicity, unspecified pulmonary embolism type, unspecified whether acute cor pulmonale present    2. Deep vein thrombosis (DVT) of proximal lower extremity, unspecified chronicity, unspecified laterality    3. Chest pain, unspecified type    4. Shortness of breath    5. Heart palpitations    6. Other form of dyspnea    7. Psoriatic arthritis    8. Acquired hypothyroidism    9. Localized edema          Plan:     Chest pain, h/o acute PE, edema ,  pulm HTN   - started Eliquis high dose - 10mg BID x 7 days and then 5 mg BID  - ECHO 5/2025 with normal bi V function, mild MR, PASP 37 mmHg.   - LE u/s with acute L pop DVT - told to continue Eliquis.   - repeat u/s   - cont Eliquis x 6 months     2. HLD  - needs further tx  - rec statin but pt does not want it     3. Psoriatric arthritis  - cont tx     4. Hypothyroidism  - cont Synthroid       Visit today " included increased complexity associated with the care of the episodic problem PE addressed and managing the longitudinal care of the patient due to the serious and/or complex managed problem(s) .      Thank you for allowing me to participate in this patient's care. Please do not hesitate to contact me with any questions or concerns. Consult note has been forwarded to the referral physician.            [1]   Social History  Tobacco Use    Smoking status: Never    Smokeless tobacco: Never   Substance Use Topics    Alcohol use: No    Drug use: Never

## 2025-08-11 ENCOUNTER — PATIENT MESSAGE (OUTPATIENT)
Dept: ADMINISTRATIVE | Facility: OTHER | Age: 70
End: 2025-08-11
Payer: MEDICARE

## 2025-08-13 ENCOUNTER — PATIENT MESSAGE (OUTPATIENT)
Dept: ADMINISTRATIVE | Facility: HOSPITAL | Age: 70
End: 2025-08-13
Payer: MEDICARE